# Patient Record
Sex: MALE | Race: OTHER | NOT HISPANIC OR LATINO | URBAN - METROPOLITAN AREA
[De-identification: names, ages, dates, MRNs, and addresses within clinical notes are randomized per-mention and may not be internally consistent; named-entity substitution may affect disease eponyms.]

---

## 2021-11-05 ENCOUNTER — INPATIENT (INPATIENT)
Facility: HOSPITAL | Age: 56
LOS: 20 days | Discharge: HOME | End: 2021-11-26
Attending: STUDENT IN AN ORGANIZED HEALTH CARE EDUCATION/TRAINING PROGRAM | Admitting: STUDENT IN AN ORGANIZED HEALTH CARE EDUCATION/TRAINING PROGRAM
Payer: MEDICAID

## 2021-11-05 VITALS
RESPIRATION RATE: 20 BRPM | SYSTOLIC BLOOD PRESSURE: 117 MMHG | TEMPERATURE: 98 F | DIASTOLIC BLOOD PRESSURE: 67 MMHG | OXYGEN SATURATION: 100 % | HEART RATE: 94 BPM

## 2021-11-05 DIAGNOSIS — Z99.2 DEPENDENCE ON RENAL DIALYSIS: ICD-10-CM

## 2021-11-05 DIAGNOSIS — N18.6 END STAGE RENAL DISEASE: ICD-10-CM

## 2021-11-05 DIAGNOSIS — K52.9 NONINFECTIVE GASTROENTERITIS AND COLITIS, UNSPECIFIED: ICD-10-CM

## 2021-11-05 DIAGNOSIS — D63.1 ANEMIA IN CHRONIC KIDNEY DISEASE: ICD-10-CM

## 2021-11-05 DIAGNOSIS — Z79.01 LONG TERM (CURRENT) USE OF ANTICOAGULANTS: ICD-10-CM

## 2021-11-05 DIAGNOSIS — Z59.7 INSUFFICIENT SOCIAL INSURANCE AND WELFARE SUPPORT: ICD-10-CM

## 2021-11-05 DIAGNOSIS — E11.65 TYPE 2 DIABETES MELLITUS WITH HYPERGLYCEMIA: ICD-10-CM

## 2021-11-05 DIAGNOSIS — E87.2 ACIDOSIS: ICD-10-CM

## 2021-11-05 DIAGNOSIS — I25.10 ATHEROSCLEROTIC HEART DISEASE OF NATIVE CORONARY ARTERY WITHOUT ANGINA PECTORIS: ICD-10-CM

## 2021-11-05 DIAGNOSIS — Z87.891 PERSONAL HISTORY OF NICOTINE DEPENDENCE: ICD-10-CM

## 2021-11-05 DIAGNOSIS — Z75.1 PERSON AWAITING ADMISSION TO ADEQUATE FACILITY ELSEWHERE: ICD-10-CM

## 2021-11-05 DIAGNOSIS — E11.42 TYPE 2 DIABETES MELLITUS WITH DIABETIC POLYNEUROPATHY: ICD-10-CM

## 2021-11-05 DIAGNOSIS — R45.1 RESTLESSNESS AND AGITATION: ICD-10-CM

## 2021-11-05 DIAGNOSIS — Z79.84 LONG TERM (CURRENT) USE OF ORAL HYPOGLYCEMIC DRUGS: ICD-10-CM

## 2021-11-05 DIAGNOSIS — Z98.890 OTHER SPECIFIED POSTPROCEDURAL STATES: Chronic | ICD-10-CM

## 2021-11-05 DIAGNOSIS — Z95.5 PRESENCE OF CORONARY ANGIOPLASTY IMPLANT AND GRAFT: ICD-10-CM

## 2021-11-05 DIAGNOSIS — E11.22 TYPE 2 DIABETES MELLITUS WITH DIABETIC CHRONIC KIDNEY DISEASE: ICD-10-CM

## 2021-11-05 DIAGNOSIS — Z86.718 PERSONAL HISTORY OF OTHER VENOUS THROMBOSIS AND EMBOLISM: ICD-10-CM

## 2021-11-05 DIAGNOSIS — E53.8 DEFICIENCY OF OTHER SPECIFIED B GROUP VITAMINS: ICD-10-CM

## 2021-11-05 DIAGNOSIS — I12.0 HYPERTENSIVE CHRONIC KIDNEY DISEASE WITH STAGE 5 CHRONIC KIDNEY DISEASE OR END STAGE RENAL DISEASE: ICD-10-CM

## 2021-11-05 LAB
ALBUMIN SERPL ELPH-MCNC: 3.6 G/DL — SIGNIFICANT CHANGE UP (ref 3.5–5.2)
ALP SERPL-CCNC: 148 U/L — HIGH (ref 30–115)
ALT FLD-CCNC: 17 U/L — SIGNIFICANT CHANGE UP (ref 0–41)
ANION GAP SERPL CALC-SCNC: 19 MMOL/L — HIGH (ref 7–14)
AST SERPL-CCNC: 18 U/L — SIGNIFICANT CHANGE UP (ref 0–41)
BASOPHILS # BLD AUTO: 0.04 K/UL — SIGNIFICANT CHANGE UP (ref 0–0.2)
BASOPHILS NFR BLD AUTO: 0.6 % — SIGNIFICANT CHANGE UP (ref 0–1)
BILIRUB SERPL-MCNC: 0.3 MG/DL — SIGNIFICANT CHANGE UP (ref 0.2–1.2)
BUN SERPL-MCNC: 49 MG/DL — HIGH (ref 10–20)
CALCIUM SERPL-MCNC: 8.1 MG/DL — LOW (ref 8.5–10.1)
CHLORIDE SERPL-SCNC: 101 MMOL/L — SIGNIFICANT CHANGE UP (ref 98–110)
CO2 SERPL-SCNC: 20 MMOL/L — SIGNIFICANT CHANGE UP (ref 17–32)
CREAT SERPL-MCNC: 4.9 MG/DL — CRITICAL HIGH (ref 0.7–1.5)
EOSINOPHIL # BLD AUTO: 0.52 K/UL — SIGNIFICANT CHANGE UP (ref 0–0.7)
EOSINOPHIL NFR BLD AUTO: 7.4 % — SIGNIFICANT CHANGE UP (ref 0–8)
GLUCOSE BLDC GLUCOMTR-MCNC: 282 MG/DL — HIGH (ref 70–99)
GLUCOSE SERPL-MCNC: 166 MG/DL — HIGH (ref 70–99)
HCT VFR BLD CALC: 32.5 % — LOW (ref 42–52)
HGB BLD-MCNC: 10.3 G/DL — LOW (ref 14–18)
IMM GRANULOCYTES NFR BLD AUTO: 0.3 % — SIGNIFICANT CHANGE UP (ref 0.1–0.3)
LIDOCAIN IGE QN: 69 U/L — HIGH (ref 7–60)
LYMPHOCYTES # BLD AUTO: 1.52 K/UL — SIGNIFICANT CHANGE UP (ref 1.2–3.4)
LYMPHOCYTES # BLD AUTO: 21.6 % — SIGNIFICANT CHANGE UP (ref 20.5–51.1)
MAGNESIUM SERPL-MCNC: 1.8 MG/DL — SIGNIFICANT CHANGE UP (ref 1.8–2.4)
MCHC RBC-ENTMCNC: 27.9 PG — SIGNIFICANT CHANGE UP (ref 27–31)
MCHC RBC-ENTMCNC: 31.7 G/DL — LOW (ref 32–37)
MCV RBC AUTO: 88.1 FL — SIGNIFICANT CHANGE UP (ref 80–94)
MONOCYTES # BLD AUTO: 0.49 K/UL — SIGNIFICANT CHANGE UP (ref 0.1–0.6)
MONOCYTES NFR BLD AUTO: 7 % — SIGNIFICANT CHANGE UP (ref 1.7–9.3)
NEUTROPHILS # BLD AUTO: 4.46 K/UL — SIGNIFICANT CHANGE UP (ref 1.4–6.5)
NEUTROPHILS NFR BLD AUTO: 63.1 % — SIGNIFICANT CHANGE UP (ref 42.2–75.2)
NRBC # BLD: 0 /100 WBCS — SIGNIFICANT CHANGE UP (ref 0–0)
PLATELET # BLD AUTO: 168 K/UL — SIGNIFICANT CHANGE UP (ref 130–400)
POTASSIUM SERPL-MCNC: 4.2 MMOL/L — SIGNIFICANT CHANGE UP (ref 3.5–5)
POTASSIUM SERPL-SCNC: 4.2 MMOL/L — SIGNIFICANT CHANGE UP (ref 3.5–5)
PROT SERPL-MCNC: 6.6 G/DL — SIGNIFICANT CHANGE UP (ref 6–8)
RBC # BLD: 3.69 M/UL — LOW (ref 4.7–6.1)
RBC # FLD: 13.5 % — SIGNIFICANT CHANGE UP (ref 11.5–14.5)
SARS-COV-2 RNA SPEC QL NAA+PROBE: SIGNIFICANT CHANGE UP
SODIUM SERPL-SCNC: 140 MMOL/L — SIGNIFICANT CHANGE UP (ref 135–146)
WBC # BLD: 7.05 K/UL — SIGNIFICANT CHANGE UP (ref 4.8–10.8)
WBC # FLD AUTO: 7.05 K/UL — SIGNIFICANT CHANGE UP (ref 4.8–10.8)

## 2021-11-05 PROCEDURE — 99223 1ST HOSP IP/OBS HIGH 75: CPT

## 2021-11-05 PROCEDURE — 99285 EMERGENCY DEPT VISIT HI MDM: CPT

## 2021-11-05 RX ORDER — PREGABALIN 225 MG/1
1 CAPSULE ORAL
Qty: 0 | Refills: 0 | DISCHARGE

## 2021-11-05 RX ORDER — CALCITRIOL 0.5 UG/1
0.5 CAPSULE ORAL
Refills: 0 | Status: DISCONTINUED | OUTPATIENT
Start: 2021-11-05 | End: 2021-11-26

## 2021-11-05 RX ORDER — SIMETHICONE 80 MG/1
80 TABLET, CHEWABLE ORAL
Refills: 0 | Status: DISCONTINUED | OUTPATIENT
Start: 2021-11-05 | End: 2021-11-26

## 2021-11-05 RX ORDER — INSULIN GLARGINE 100 [IU]/ML
21 INJECTION, SOLUTION SUBCUTANEOUS AT BEDTIME
Refills: 0 | Status: DISCONTINUED | OUTPATIENT
Start: 2021-11-05 | End: 2021-11-26

## 2021-11-05 RX ORDER — DEXTROSE 50 % IN WATER 50 %
25 SYRINGE (ML) INTRAVENOUS ONCE
Refills: 0 | Status: DISCONTINUED | OUTPATIENT
Start: 2021-11-05 | End: 2021-11-26

## 2021-11-05 RX ORDER — GLUCAGON INJECTION, SOLUTION 0.5 MG/.1ML
1 INJECTION, SOLUTION SUBCUTANEOUS ONCE
Refills: 0 | Status: DISCONTINUED | OUTPATIENT
Start: 2021-11-05 | End: 2021-11-26

## 2021-11-05 RX ORDER — DARBEPOETIN ALFA IN POLYSORBAT 200MCG/0.4
0.4 PEN INJECTOR (ML) SUBCUTANEOUS
Qty: 0 | Refills: 0 | DISCHARGE

## 2021-11-05 RX ORDER — DEXTROSE 50 % IN WATER 50 %
12.5 SYRINGE (ML) INTRAVENOUS ONCE
Refills: 0 | Status: DISCONTINUED | OUTPATIENT
Start: 2021-11-05 | End: 2021-11-26

## 2021-11-05 RX ORDER — SODIUM CHLORIDE 9 MG/ML
1000 INJECTION, SOLUTION INTRAVENOUS
Refills: 0 | Status: DISCONTINUED | OUTPATIENT
Start: 2021-11-05 | End: 2021-11-26

## 2021-11-05 RX ORDER — GABAPENTIN 400 MG/1
300 CAPSULE ORAL EVERY 12 HOURS
Refills: 0 | Status: ACTIVE | OUTPATIENT
Start: 2021-11-05 | End: 2022-10-04

## 2021-11-05 RX ORDER — DEXTROSE 50 % IN WATER 50 %
15 SYRINGE (ML) INTRAVENOUS ONCE
Refills: 0 | Status: DISCONTINUED | OUTPATIENT
Start: 2021-11-05 | End: 2021-11-26

## 2021-11-05 RX ORDER — INSULIN LISPRO 100/ML
7 VIAL (ML) SUBCUTANEOUS
Refills: 0 | Status: DISCONTINUED | OUTPATIENT
Start: 2021-11-05 | End: 2021-11-26

## 2021-11-05 RX ORDER — IRON SUCROSE 20 MG/ML
5 INJECTION, SOLUTION INTRAVENOUS
Qty: 0 | Refills: 0 | DISCHARGE

## 2021-11-05 RX ORDER — PREGABALIN 225 MG/1
1000 CAPSULE ORAL DAILY
Refills: 0 | Status: DISCONTINUED | OUTPATIENT
Start: 2021-11-05 | End: 2021-11-26

## 2021-11-05 RX ORDER — DARBEPOETIN ALFA IN POLYSORBAT 200MCG/0.4
40 PEN INJECTOR (ML) SUBCUTANEOUS
Refills: 0 | Status: DISCONTINUED | OUTPATIENT
Start: 2021-11-11 | End: 2021-11-17

## 2021-11-05 RX ORDER — CLOPIDOGREL BISULFATE 75 MG/1
75 TABLET, FILM COATED ORAL DAILY
Refills: 0 | Status: DISCONTINUED | OUTPATIENT
Start: 2021-11-05 | End: 2021-11-26

## 2021-11-05 RX ORDER — APIXABAN 2.5 MG/1
5 TABLET, FILM COATED ORAL
Refills: 0 | Status: DISCONTINUED | OUTPATIENT
Start: 2021-11-05 | End: 2021-11-26

## 2021-11-05 RX ORDER — INSULIN LISPRO 100/ML
VIAL (ML) SUBCUTANEOUS
Refills: 0 | Status: DISCONTINUED | OUTPATIENT
Start: 2021-11-05 | End: 2021-11-26

## 2021-11-05 RX ORDER — ATORVASTATIN CALCIUM 80 MG/1
40 TABLET, FILM COATED ORAL AT BEDTIME
Refills: 0 | Status: DISCONTINUED | OUTPATIENT
Start: 2021-11-05 | End: 2021-11-26

## 2021-11-05 RX ORDER — IRON SUCROSE 20 MG/ML
100 INJECTION, SOLUTION INTRAVENOUS
Refills: 0 | Status: DISCONTINUED | OUTPATIENT
Start: 2021-11-05 | End: 2021-11-24

## 2021-11-05 RX ADMIN — INSULIN GLARGINE 21 UNIT(S): 100 INJECTION, SOLUTION SUBCUTANEOUS at 22:34

## 2021-11-05 RX ADMIN — ATORVASTATIN CALCIUM 40 MILLIGRAM(S): 80 TABLET, FILM COATED ORAL at 22:35

## 2021-11-05 SDOH — ECONOMIC STABILITY - INCOME SECURITY: INSUFFICIENT SOCIAL INSURANCE AND WELFARE SUPPORT: Z59.7

## 2021-11-05 NOTE — ED PROVIDER NOTE - ATTENDING CONTRIBUTION TO CARE
55 yo m with pmh of esrd hd t/th/sat, anemia, cad, hld, dm2, cellulitis, difficulty walking, sent by [facility] for agitation.  as per document, pt was verbally abusive to the staff and refusing care.  as per pt, he has been neglected at the NH and wants a different placement.  he is hoping that there could be a way to have home dialysis set up for him.  no n/v/d, no fever, no chills, no sob, no abd pain.  exam: nad, ncat, perrl, eomi, mmm, rrr, ctab, abd soft, nt,nd aox3, 4/5 BLE strength imp: pt with esrd on hd, unable to walk, having difficulty with his current NH and wants a different arrangement.  will check labs and admit for evaluation

## 2021-11-05 NOTE — H&P ADULT - NSHPPHYSICALEXAM_GEN_ALL_CORE
General: No acute distress; Pallor (-), Icterus (-), Cyanosis (-), Clubbing (-)  HEENT: Normocephalic, atraumatic, PERRLA, EOMI  PULM: Bilaterally equal and clear breath sounds, wheeze (-), rubs (-), crackles (-)  CVS: Normal S1 and S2, murmurs (-), rubs (-), gallops (-), tessio on left chest wall  GI: Soft, nondistended, nontender, BS +  MSK: Edema (-), no muscle, bone or joint tenderness noted  SKIN: Warm and well perfused, chronic venostatic changes above bilateral medial malleoli  NEURO:  Alert and Oriented x 3; No gross focal neurological deficit noted

## 2021-11-05 NOTE — H&P ADULT - NSICDXPASTMEDICALHX_GEN_ALL_CORE_FT
PAST MEDICAL HISTORY:  CAD (coronary artery disease)     DM (diabetes mellitus)     DVT (deep venous thrombosis)     ESRD on dialysis

## 2021-11-05 NOTE — ED PROVIDER NOTE - NS ED ROS FT
Constitutional: (-) fever  Eyes/ENT: (-) blurry vision, (-) epistaxis  Cardiovascular: (-) chest pain, (-) syncope  Respiratory: (-) cough, (-) shortness of breath  Gastrointestinal: (-) vomiting, (-) diarrhea  Gu: (-) dysuria, (-) hematuria  Musculoskeletal: (-) neck pain, (-) back pain, (-) joint pain  Integumentary: (-) rash, (-) edema  Neurological: (-) headache, (-) altered mental status  Allergic/Immunologic: (-) pruritus

## 2021-11-05 NOTE — H&P ADULT - ASSESSMENT
57 yo M with PMH of DM, CAD s/p Stents, ESRD on HD sat/tu/th presented from [facility] for eval of agitation. As per NH pt became agitated and refused medications and dialysis yesterday. Per patient he had inadequate care at NH and wanted to go home with home care.     # Inadequate social support  - ESRD recently started on HD 6 weeks ago  - Access - Tessio  - was in NH for 3 days but wanted to go home  - Case mgmgt/  consult for placement  - PT eval    # ESRD on HD on S/Tu/Th  - Diabetic nephropathy  - Nephro eval for HD while admitted  - Monitor BMP, phos  - avoid nephrotoxic meds  - continue with venofer, calcitriol    # CAD s/p stents  - patient states that he had cath in 2017 with stents but doesn't remember the name of his cardiologist  - continue with Plavix, statin    # DM/diabetic neuropathy  - FS  - A1c  - Basal bolus - 21/7/7/7  - Sliding scale  - continue with gabapentin    # Chronic diarrhea  - had extensive workup in past  - likely sec to ESRD    # h/o DVT  - per patient he was on coumadin for 15 years  - recently switched to eliquis  - continue with eliquis    # Diet: Renal  # Activity: IAT  # GI PPX: NA  # DVT PPX: On eliquis  # Full code

## 2021-11-05 NOTE — H&P ADULT - ATTENDING COMMENTS
57 YO M with a PMH of DM2, CAD s/p Stents, and ESRD on HD (TTS) who presents to the hospital from his NH for eval of agitation. Pt denies this and states there "was a disagreement between him and the NH." ROS is negative except as above.     FMHx: Reviewed, not relevant    Physical exam shows pt in NAD. VSS, afebrile, not hypoxic on RA. A&Ox3. Neuro exam without deficits, motor/sensory intact, no dysarthria, no facial asymmetry. Muscle strength/sensation intact. CTA B/L with no W/C/R. RRR, no M/G/R. ABD is soft and non-tender, normoactive BSs. LEs without swelling. No rashes. Labs and radiology as above.     ESRD on HD (TTS). No current indication for emergent HD. Renal consult for in-pt HD. Restart home meds.     Diabetes mellitus with hyperglycemia. A1c. FSs. Insulin standing/correctional dosing    Normocytic anemia, at goal for ESRD pt. Pt denies any bleeding symptoms. Replace as necessary.     HAGMA (High AG Metabolic Acidosis) from uremic acidosis. Repeat BMP in the AM.     Social Problem. Social consult. PT eval    Hx of CAD s/p Stents. Restart home meds, except as stated above. DVT PPX. Inform PCP of pt's admission to hospital. My note supersedes the residents note.    Date seen by [email or web address]

## 2021-11-05 NOTE — ED ADULT NURSE REASSESSMENT NOTE - NS ED NURSE REASSESS COMMENT FT1
Pt assessed. Pt is awake and alert. Pt denies any pain or discomfort at this time. Pt admitted to MED; awaiting bed. Pt has R CW tesio for HD. Fall precautions maintained. Pt is not in any distress. Safety and comfort

## 2021-11-05 NOTE — ED PROVIDER NOTE - CLINICAL SUMMARY MEDICAL DECISION MAKING FREE TEXT BOX
pt with esrd on hd, unable to walk, having difficulty with his current NH and wants a different arrangement.  admit for SW evaluation

## 2021-11-05 NOTE — ED PROVIDER NOTE - OBJECTIVE STATEMENT
56y M pmh ESRD sat/tu/th presents for eval of agitation. As per NH pt became agitated and refused medications and dialysis yesterday. Pt states this is false. He had dialysis yesterday, became hypotensive afterwards with an episode of nbnb diarrhea and requested help from a nurse at the NH who did not help him and refused to give him any meds last night or this morning leading to him requesting to be sent back to the hospital or home. Pt at baseline is unsteady on his feet and unable to go home independently so was sent to ED. At this time pt has no complaints but refuses to go back to [facility].

## 2021-11-05 NOTE — ED PROVIDER NOTE - PROGRESS NOTE DETAILS
FB: I called [facility] but was told by the  Arely that there was no nurse or nurse supervisor that could provide information about the reason for pt being sent to ED. As per Secretary Mcgee pt was refusing medications and dialysis.

## 2021-11-05 NOTE — H&P ADULT - NSHPLABSRESULTS_GEN_ALL_CORE
(11-05 @ 15:55)                      10.3  7.05 )-----------( 168                 32.5    Neutrophils = 4.46 (63.1%)  Lymphocytes = 1.52 (21.6%)  Eosinophils = 0.52 (7.4%)  Basophils = 0.04 (0.6%)  Monocytes = 0.49 (7.0%)  Bands = --%    11-05    140  |  101  |  49<H>  ----------------------------<  166<H>  4.2   |  20  |  4.9<HH>    Ca    8.1<L>      05 Nov 2021 15:55  Mg     1.8     11-05    TPro  6.6  /  Alb  3.6  /  TBili  0.3  /  DBili  x   /  AST  18  /  ALT  17  /  AlkPhos  148<H>  11-05

## 2021-11-05 NOTE — ED ADULT TRIAGE NOTE - CHIEF COMPLAINT QUOTE
TANYA from [facility] ns home with c/o failure to thrive and diarrhea. AS per NSG home patient has been agitated and verbally abusive towards staff. He has refused dialysis treatment and care. Hx ESRD right ABEL ochoa

## 2021-11-05 NOTE — H&P ADULT - NSHPSOCIALHISTORY_GEN_ALL_CORE
Substance Use (street drugs): ( x ) never used  (  ) other:  Tobacco Usage:  ( ) never smoked   (  X ) former smoker - 30 pack years   (   ) current smoker  (     ) pack year  Alcohol Usage: None

## 2021-11-05 NOTE — ED ADULT NURSE NOTE - OBJECTIVE STATEMENT
Patient BIBEMS from [facility] for placement, patient states he was not getting good care at [facility] and wants to go somewhere else. Patient also requesting HD set up at home. On assessment no s/s of distress noted at this time. Patient denies nausea, vomiting ,fever, chills, SOB, CP.

## 2021-11-05 NOTE — H&P ADULT - HISTORY OF PRESENT ILLNESS
55 yo M with PMH of DM, CAD s/p Stents, ESRD on HD sat/tu/th presented from [facility] for eval of agitation. As per NH pt became agitated and refused medications and dialysis yesterday. Pt states this is false. He had dialysis yesterday, became hypotensive afterwards with an episode of nbnb diarrhea and requested help from a nurse at the NH who did not help him and refused to give him any meds last night or this morning leading to him requesting to be sent back to the hospital or home. Patient was recently admitted to [facility] for ESRD and was started on HD 6 weeks ago. Pt at baseline is unsteady on his feet d/t neuropathy and unable to go home independently so was sent to ED. At this time pt has no complaints but refuses to go back to [facility].  In the ED vitals were stable. Labs were significant for Hb 10.3, BUN/Cr of 49/4.9. Patient being admitted for placement.

## 2021-11-05 NOTE — ED ADULT NURSE NOTE - CHIEF COMPLAINT QUOTE
TANYA from [facility] ns home with c/o failure to thrive and diarrhea. AS per NSG home patient has been agitated and verbally abusive towards staff. He has refused dialysis treatment and care. Hx ESRD right ABEL ochoa

## 2021-11-06 LAB
A1C WITH ESTIMATED AVERAGE GLUCOSE RESULT: 5.7 % — HIGH (ref 4–5.6)
ALBUMIN SERPL ELPH-MCNC: 3.6 G/DL — SIGNIFICANT CHANGE UP (ref 3.5–5.2)
ALBUMIN SERPL ELPH-MCNC: 3.8 G/DL — SIGNIFICANT CHANGE UP (ref 3.5–5.2)
ALP SERPL-CCNC: 171 U/L — HIGH (ref 30–115)
ALP SERPL-CCNC: 174 U/L — HIGH (ref 30–115)
ALT FLD-CCNC: 17 U/L — SIGNIFICANT CHANGE UP (ref 0–41)
ALT FLD-CCNC: 18 U/L — SIGNIFICANT CHANGE UP (ref 0–41)
ANION GAP SERPL CALC-SCNC: 18 MMOL/L — HIGH (ref 7–14)
ANION GAP SERPL CALC-SCNC: 24 MMOL/L — HIGH (ref 7–14)
AST SERPL-CCNC: 16 U/L — SIGNIFICANT CHANGE UP (ref 0–41)
AST SERPL-CCNC: 18 U/L — SIGNIFICANT CHANGE UP (ref 0–41)
BASOPHILS # BLD AUTO: 0.07 K/UL — SIGNIFICANT CHANGE UP (ref 0–0.2)
BASOPHILS NFR BLD AUTO: 0.9 % — SIGNIFICANT CHANGE UP (ref 0–1)
BILIRUB SERPL-MCNC: 0.3 MG/DL — SIGNIFICANT CHANGE UP (ref 0.2–1.2)
BILIRUB SERPL-MCNC: <0.2 MG/DL — SIGNIFICANT CHANGE UP (ref 0.2–1.2)
BUN SERPL-MCNC: 60 MG/DL — HIGH (ref 10–20)
BUN SERPL-MCNC: 67 MG/DL — CRITICAL HIGH (ref 10–20)
CALCIUM SERPL-MCNC: 8.1 MG/DL — LOW (ref 8.5–10.1)
CALCIUM SERPL-MCNC: 8.2 MG/DL — LOW (ref 8.5–10.1)
CHLORIDE SERPL-SCNC: 102 MMOL/L — SIGNIFICANT CHANGE UP (ref 98–110)
CHLORIDE SERPL-SCNC: 103 MMOL/L — SIGNIFICANT CHANGE UP (ref 98–110)
CO2 SERPL-SCNC: 15 MMOL/L — LOW (ref 17–32)
CO2 SERPL-SCNC: 21 MMOL/L — SIGNIFICANT CHANGE UP (ref 17–32)
CREAT SERPL-MCNC: 5.9 MG/DL — CRITICAL HIGH (ref 0.7–1.5)
CREAT SERPL-MCNC: 6.2 MG/DL — CRITICAL HIGH (ref 0.7–1.5)
EOSINOPHIL # BLD AUTO: 0.72 K/UL — HIGH (ref 0–0.7)
EOSINOPHIL NFR BLD AUTO: 8.9 % — HIGH (ref 0–8)
ESTIMATED AVERAGE GLUCOSE: 117 MG/DL — HIGH (ref 68–114)
GLUCOSE BLDC GLUCOMTR-MCNC: 127 MG/DL — HIGH (ref 70–99)
GLUCOSE BLDC GLUCOMTR-MCNC: 145 MG/DL — HIGH (ref 70–99)
GLUCOSE BLDC GLUCOMTR-MCNC: 154 MG/DL — HIGH (ref 70–99)
GLUCOSE BLDC GLUCOMTR-MCNC: 162 MG/DL — HIGH (ref 70–99)
GLUCOSE SERPL-MCNC: 142 MG/DL — HIGH (ref 70–99)
GLUCOSE SERPL-MCNC: 180 MG/DL — HIGH (ref 70–99)
HCT VFR BLD CALC: 33.7 % — LOW (ref 42–52)
HCT VFR BLD CALC: 33.8 % — LOW (ref 42–52)
HCV AB S/CO SERPL IA: 0.14 COI — SIGNIFICANT CHANGE UP
HCV AB SERPL-IMP: SIGNIFICANT CHANGE UP
HGB BLD-MCNC: 10.5 G/DL — LOW (ref 14–18)
HGB BLD-MCNC: 10.5 G/DL — LOW (ref 14–18)
IMM GRANULOCYTES NFR BLD AUTO: 0.2 % — SIGNIFICANT CHANGE UP (ref 0.1–0.3)
LYMPHOCYTES # BLD AUTO: 1.42 K/UL — SIGNIFICANT CHANGE UP (ref 1.2–3.4)
LYMPHOCYTES # BLD AUTO: 17.6 % — LOW (ref 20.5–51.1)
MAGNESIUM SERPL-MCNC: 1.8 MG/DL — SIGNIFICANT CHANGE UP (ref 1.8–2.4)
MCHC RBC-ENTMCNC: 27.8 PG — SIGNIFICANT CHANGE UP (ref 27–31)
MCHC RBC-ENTMCNC: 28.1 PG — SIGNIFICANT CHANGE UP (ref 27–31)
MCHC RBC-ENTMCNC: 31.1 G/DL — LOW (ref 32–37)
MCHC RBC-ENTMCNC: 31.2 G/DL — LOW (ref 32–37)
MCV RBC AUTO: 89.4 FL — SIGNIFICANT CHANGE UP (ref 80–94)
MCV RBC AUTO: 90.1 FL — SIGNIFICANT CHANGE UP (ref 80–94)
MONOCYTES # BLD AUTO: 0.56 K/UL — SIGNIFICANT CHANGE UP (ref 0.1–0.6)
MONOCYTES NFR BLD AUTO: 7 % — SIGNIFICANT CHANGE UP (ref 1.7–9.3)
NEUTROPHILS # BLD AUTO: 5.26 K/UL — SIGNIFICANT CHANGE UP (ref 1.4–6.5)
NEUTROPHILS NFR BLD AUTO: 65.4 % — SIGNIFICANT CHANGE UP (ref 42.2–75.2)
NRBC # BLD: 0 /100 WBCS — SIGNIFICANT CHANGE UP (ref 0–0)
NRBC # BLD: 0 /100 WBCS — SIGNIFICANT CHANGE UP (ref 0–0)
PHOSPHATE SERPL-MCNC: 5.7 MG/DL — HIGH (ref 2.1–4.9)
PLATELET # BLD AUTO: 173 K/UL — SIGNIFICANT CHANGE UP (ref 130–400)
PLATELET # BLD AUTO: 202 K/UL — SIGNIFICANT CHANGE UP (ref 130–400)
POTASSIUM SERPL-MCNC: 4 MMOL/L — SIGNIFICANT CHANGE UP (ref 3.5–5)
POTASSIUM SERPL-MCNC: 4.1 MMOL/L — SIGNIFICANT CHANGE UP (ref 3.5–5)
POTASSIUM SERPL-SCNC: 4 MMOL/L — SIGNIFICANT CHANGE UP (ref 3.5–5)
POTASSIUM SERPL-SCNC: 4.1 MMOL/L — SIGNIFICANT CHANGE UP (ref 3.5–5)
PROT SERPL-MCNC: 6.5 G/DL — SIGNIFICANT CHANGE UP (ref 6–8)
PROT SERPL-MCNC: 6.8 G/DL — SIGNIFICANT CHANGE UP (ref 6–8)
RBC # BLD: 3.74 M/UL — LOW (ref 4.7–6.1)
RBC # BLD: 3.78 M/UL — LOW (ref 4.7–6.1)
RBC # FLD: 13.3 % — SIGNIFICANT CHANGE UP (ref 11.5–14.5)
RBC # FLD: 13.5 % — SIGNIFICANT CHANGE UP (ref 11.5–14.5)
SODIUM SERPL-SCNC: 141 MMOL/L — SIGNIFICANT CHANGE UP (ref 135–146)
SODIUM SERPL-SCNC: 142 MMOL/L — SIGNIFICANT CHANGE UP (ref 135–146)
WBC # BLD: 8.05 K/UL — SIGNIFICANT CHANGE UP (ref 4.8–10.8)
WBC # BLD: 8.21 K/UL — SIGNIFICANT CHANGE UP (ref 4.8–10.8)
WBC # FLD AUTO: 8.05 K/UL — SIGNIFICANT CHANGE UP (ref 4.8–10.8)
WBC # FLD AUTO: 8.21 K/UL — SIGNIFICANT CHANGE UP (ref 4.8–10.8)

## 2021-11-06 RX ORDER — INFLUENZA VIRUS VACCINE 15; 15; 15; 15 UG/.5ML; UG/.5ML; UG/.5ML; UG/.5ML
0.5 SUSPENSION INTRAMUSCULAR ONCE
Refills: 0 | Status: DISCONTINUED | OUTPATIENT
Start: 2021-11-06 | End: 2021-11-26

## 2021-11-06 RX ORDER — CALCIUM ACETATE 667 MG
667 TABLET ORAL
Refills: 0 | Status: DISCONTINUED | OUTPATIENT
Start: 2021-11-06 | End: 2021-11-17

## 2021-11-06 RX ADMIN — PREGABALIN 1000 MICROGRAM(S): 225 CAPSULE ORAL at 11:47

## 2021-11-06 RX ADMIN — Medication 667 MILLIGRAM(S): at 18:19

## 2021-11-06 RX ADMIN — Medication 7 UNIT(S): at 16:40

## 2021-11-06 RX ADMIN — SIMETHICONE 80 MILLIGRAM(S): 80 TABLET, CHEWABLE ORAL at 13:33

## 2021-11-06 RX ADMIN — SIMETHICONE 80 MILLIGRAM(S): 80 TABLET, CHEWABLE ORAL at 18:19

## 2021-11-06 RX ADMIN — Medication 7 UNIT(S): at 11:46

## 2021-11-06 RX ADMIN — CALCITRIOL 0.5 MICROGRAM(S): 0.5 CAPSULE ORAL at 11:47

## 2021-11-06 RX ADMIN — Medication 1: at 08:05

## 2021-11-06 RX ADMIN — ATORVASTATIN CALCIUM 40 MILLIGRAM(S): 80 TABLET, FILM COATED ORAL at 21:42

## 2021-11-06 RX ADMIN — GABAPENTIN 300 MILLIGRAM(S): 400 CAPSULE ORAL at 05:16

## 2021-11-06 RX ADMIN — Medication 667 MILLIGRAM(S): at 21:42

## 2021-11-06 RX ADMIN — APIXABAN 5 MILLIGRAM(S): 2.5 TABLET, FILM COATED ORAL at 17:51

## 2021-11-06 RX ADMIN — Medication 7 UNIT(S): at 08:05

## 2021-11-06 RX ADMIN — CLOPIDOGREL BISULFATE 75 MILLIGRAM(S): 75 TABLET, FILM COATED ORAL at 11:48

## 2021-11-06 RX ADMIN — INSULIN GLARGINE 21 UNIT(S): 100 INJECTION, SOLUTION SUBCUTANEOUS at 21:42

## 2021-11-06 RX ADMIN — GABAPENTIN 300 MILLIGRAM(S): 400 CAPSULE ORAL at 17:51

## 2021-11-06 RX ADMIN — APIXABAN 5 MILLIGRAM(S): 2.5 TABLET, FILM COATED ORAL at 05:16

## 2021-11-06 RX ADMIN — Medication 1 TABLET(S): at 11:48

## 2021-11-06 NOTE — PHYSICAL THERAPY INITIAL EVALUATION ADULT - PERTINENT HX OF CURRENT PROBLEM, REHAB EVAL
55 yo M with PMH of DM, CAD s/p Stents, ESRD on HD sat/tu/th presented from [facility] for eval of agitation. As per NH pt became agitated and refused medications and dialysis yesterday. Per patient he had inadequate care at NH and wanted to go home with home care.

## 2021-11-06 NOTE — PROGRESS NOTE ADULT - ASSESSMENT
ALEXANDRO MONROE 56y Male  MRN#: [id number]   CODE STATUS:________      SUBJECTIVE  Patient is a 56y old Male who presents with a chief complaint of Inadequate social support (05 Nov 2021 22:03)  Currently admitted to medicine with the primary diagnosis of Inadequate social support  Today is hospital day 1d, and this morning he is _________ and reports ________ overnight events.     OBJECTIVE  PAST MEDICAL & SURGICAL HISTORY  CAD (coronary artery disease)    DVT (deep venous thrombosis)    ESRD on dialysis    DM (diabetes mellitus)    H/O varicose vein stripping      ALLERGIES:  No Known Allergies    MEDICATIONS:  STANDING MEDICATIONS  apixaban 5 milliGRAM(s) Oral two times a day  atorvastatin 40 milliGRAM(s) Oral at bedtime  calcitriol   Capsule 0.5 MICROGram(s) Oral <User Schedule>  clopidogrel Tablet 75 milliGRAM(s) Oral daily  cyanocobalamin 1000 MICROGram(s) Oral daily  dextrose 40% Gel 15 Gram(s) Oral once  dextrose 5%. 1000 milliLiter(s) IV Continuous <Continuous>  dextrose 5%. 1000 milliLiter(s) IV Continuous <Continuous>  dextrose 50% Injectable 25 Gram(s) IV Push once  dextrose 50% Injectable 12.5 Gram(s) IV Push once  dextrose 50% Injectable 25 Gram(s) IV Push once  gabapentin 300 milliGRAM(s) Oral every 12 hours  glucagon  Injectable 1 milliGRAM(s) IntraMuscular once  influenza   Vaccine 0.5 milliLiter(s) IntraMuscular once  insulin glargine Injectable (LANTUS) 21 Unit(s) SubCutaneous at bedtime  insulin lispro (ADMELOG) corrective regimen sliding scale   SubCutaneous three times a day before meals  insulin lispro Injectable (ADMELOG) 7 Unit(s) SubCutaneous three times a day before meals  iron sucrose Injectable 100 milliGRAM(s) IV Push <User Schedule>  multivitamin 1 Tablet(s) Oral daily    PRN MEDICATIONS  simethicone 80 milliGRAM(s) Chew four times a day PRN      VITAL SIGNS: Last 24 Hours  T(C): 36.4 (06 Nov 2021 04:20), Max: 36.8 (05 Nov 2021 14:34)  T(F): 97.6 (06 Nov 2021 04:20), Max: 98.2 (05 Nov 2021 14:34)  HR: 101 (06 Nov 2021 04:20) (94 - 101)  BP: 132/66 (06 Nov 2021 04:20) (117/67 - 132/66)  BP(mean): --  RR: 18 (06 Nov 2021 04:20) (18 - 20)  SpO2: 100% (05 Nov 2021 14:34) (100% - 100%)    LABS:                        10.3   7.05  )-----------( 168      ( 05 Nov 2021 15:55 )             32.5     11-05    140  |  101  |  49<H>  ----------------------------<  166<H>  4.2   |  20  |  4.9<HH>    Ca    8.1<L>      05 Nov 2021 15:55  Mg     1.8     11-05    TPro  6.6  /  Alb  3.6  /  TBili  0.3  /  DBili  x   /  AST  18  /  ALT  17  /  AlkPhos  148<H>  11-05                  RADIOLOGY:      PHYSICAL EXAM:    GENERAL: NAD, well-developed, AAOx3  HEENT:  Atraumatic, Normocephalic. EOMI, PERRLA, conjunctiva and sclera clear, No JVD  PULMONARY: Clear to auscultation bilaterally; No wheeze  CARDIOVASCULAR: Regular rate and rhythm; No murmurs, rubs, or gallops  GASTROINTESTINAL: Soft, Nontender, Nondistended; Bowel sounds present  MUSCULOSKELETAL:  2+ Peripheral Pulses, No clubbing, cyanosis, or edema  NEUROLOGY: non-focal  SKIN: No rashes or lesions    ASSESSMENT & PLAN  55 yo M with PMH of DM, CAD s/p Stents, ESRD on HD sat/tu/th presented from [facility] for eval of agitation. As per NH pt became agitated and refused medications and dialysis yesterday. Per patient he had inadequate care at NH and wanted to go home with home care.    # ESRD on HD (TTS). No current indication for emergent HD. Renal consult for in-pt HD. Restart home meds.     # Diabetes mellitus with hyperglycemia. A1c. FSs. Insulin standing/correctional dosing    # Normocytic anemia, at goal for ESRD pt. Pt denies any bleeding symptoms. Replace as necessary.     # HAGMA (High AG Metabolic Acidosis) from uremic acidosis. Repeat BMP in the AM.     # Hx of CAD s/p Stents. plavix and lipitor     # H/O DVT: per patient he was on coumadin for 15 years, recently switched to [employer]     PT/ social work consult for placement     DVT PPX.    ready for dscharge from medical standpoint, pending discharge plan  ALEXANDRO MONROE 56y Male  MRN#: [id number]   CODE STATUS:full code      SUBJECTIVE  Patient is a 56y old Male who presents with a chief complaint of Inadequate social support (05 Nov 2021 22:03)  Currently admitted to medicine with the primary diagnosis of Inadequate social support  Today is hospital day 1d, and this morning he is resting in bed and reports no overnight events.     OBJECTIVE  PAST MEDICAL & SURGICAL HISTORY  CAD (coronary artery disease)    DVT (deep venous thrombosis)    ESRD on dialysis    DM (diabetes mellitus)    H/O varicose vein stripping      ALLERGIES:  No Known Allergies    MEDICATIONS:  STANDING MEDICATIONS  apixaban 5 milliGRAM(s) Oral two times a day  atorvastatin 40 milliGRAM(s) Oral at bedtime  calcitriol   Capsule 0.5 MICROGram(s) Oral <User Schedule>  clopidogrel Tablet 75 milliGRAM(s) Oral daily  cyanocobalamin 1000 MICROGram(s) Oral daily  dextrose 40% Gel 15 Gram(s) Oral once  dextrose 5%. 1000 milliLiter(s) IV Continuous <Continuous>  dextrose 5%. 1000 milliLiter(s) IV Continuous <Continuous>  dextrose 50% Injectable 25 Gram(s) IV Push once  dextrose 50% Injectable 12.5 Gram(s) IV Push once  dextrose 50% Injectable 25 Gram(s) IV Push once  gabapentin 300 milliGRAM(s) Oral every 12 hours  glucagon  Injectable 1 milliGRAM(s) IntraMuscular once  influenza   Vaccine 0.5 milliLiter(s) IntraMuscular once  insulin glargine Injectable (LANTUS) 21 Unit(s) SubCutaneous at bedtime  insulin lispro (ADMELOG) corrective regimen sliding scale   SubCutaneous three times a day before meals  insulin lispro Injectable (ADMELOG) 7 Unit(s) SubCutaneous three times a day before meals  iron sucrose Injectable 100 milliGRAM(s) IV Push <User Schedule>  multivitamin 1 Tablet(s) Oral daily    PRN MEDICATIONS  simethicone 80 milliGRAM(s) Chew four times a day PRN      VITAL SIGNS: Last 24 Hours  T(C): 36.4 (06 Nov 2021 04:20), Max: 36.8 (05 Nov 2021 14:34)  T(F): 97.6 (06 Nov 2021 04:20), Max: 98.2 (05 Nov 2021 14:34)  HR: 101 (06 Nov 2021 04:20) (94 - 101)  BP: 132/66 (06 Nov 2021 04:20) (117/67 - 132/66)  BP(mean): --  RR: 18 (06 Nov 2021 04:20) (18 - 20)  SpO2: 100% (05 Nov 2021 14:34) (100% - 100%)    LABS:                        10.3   7.05  )-----------( 168      ( 05 Nov 2021 15:55 )             32.5     11-05    140  |  101  |  49<H>  ----------------------------<  166<H>  4.2   |  20  |  4.9<HH>    Ca    8.1<L>      05 Nov 2021 15:55  Mg     1.8     11-05    TPro  6.6  /  Alb  3.6  /  TBili  0.3  /  DBili  x   /  AST  18  /  ALT  17  /  AlkPhos  148<H>  11-05                  RADIOLOGY:      PHYSICAL EXAM:    GENERAL: NAD, well-developed, AAOx3  HEENT:  Atraumatic, Normocephalic. EOMI, PERRLA, conjunctiva and sclera clear, No JVD  PULMONARY: Clear to auscultation bilaterally; No wheeze  CARDIOVASCULAR: Regular rate and rhythm; No murmurs, rubs, or gallops  GASTROINTESTINAL: Soft, Nontender, Nondistended; Bowel sounds present  MUSCULOSKELETAL:  2+ Peripheral Pulses, No clubbing, cyanosis, or edema  NEUROLOGY: non-focal  SKIN: No rashes or lesions    ASSESSMENT & PLAN  55 yo M with PMH of DM, CAD s/p Stents, ESRD on HD sat/tu/th presented from [facility] for eval of agitation. As per NH pt became agitated and refused medications and dialysis yesterday. Per patient he had inadequate care at NH and wanted to go home with home care.    # ESRD on HD (TTS). No current indication for emergent HD. Renal consult for in-pt HD. Restart home meds.     # Diabetes mellitus with hyperglycemia. A1c. FSs. Insulin standing/correctional dosing    # Normocytic anemia, at goal for ESRD pt. Pt denies any bleeding symptoms. Replace as necessary.     # HAGMA (High AG Metabolic Acidosis) from uremic acidosis. Repeat BMP in the AM.     # Hx of CAD s/p Stents. plavix and lipitor     # H/O DVT: per patient he was on coumadin for 15 years, recently switched to [facility]     PT/ social work consult for placement     DVT PPX.    ready for discharge from medical standpoint, pending discharge plan, patient refuses to go back to NH    pending: PT/ social work and CM f/u

## 2021-11-06 NOTE — PHYSICAL THERAPY INITIAL EVALUATION ADULT - PATIENT/FAMILY/SIGNIFICANT OTHER GOALS STATEMENT, PT EVAL
wishes to return home and do home dialysis , states he will be ambulanced home and brought up stairs

## 2021-11-07 LAB
ALBUMIN SERPL ELPH-MCNC: 3.3 G/DL — LOW (ref 3.5–5.2)
ALP SERPL-CCNC: 124 U/L — HIGH (ref 30–115)
ALT FLD-CCNC: 15 U/L — SIGNIFICANT CHANGE UP (ref 0–41)
ANION GAP SERPL CALC-SCNC: 17 MMOL/L — HIGH (ref 7–14)
AST SERPL-CCNC: 14 U/L — SIGNIFICANT CHANGE UP (ref 0–41)
BASOPHILS # BLD AUTO: 0.05 K/UL — SIGNIFICANT CHANGE UP (ref 0–0.2)
BASOPHILS NFR BLD AUTO: 0.7 % — SIGNIFICANT CHANGE UP (ref 0–1)
BILIRUB SERPL-MCNC: 0.2 MG/DL — SIGNIFICANT CHANGE UP (ref 0.2–1.2)
BUN SERPL-MCNC: 72 MG/DL — CRITICAL HIGH (ref 10–20)
CALCIUM SERPL-MCNC: 8.1 MG/DL — LOW (ref 8.5–10.1)
CHLORIDE SERPL-SCNC: 103 MMOL/L — SIGNIFICANT CHANGE UP (ref 98–110)
CO2 SERPL-SCNC: 20 MMOL/L — SIGNIFICANT CHANGE UP (ref 17–32)
COVID-19 NUCLEOCAPSID GAM AB INTERP: POSITIVE
COVID-19 NUCLEOCAPSID TOTAL GAM ANTIBODY RESULT: 79.5 INDEX — HIGH
COVID-19 SPIKE DOMAIN AB INTERP: POSITIVE
COVID-19 SPIKE DOMAIN ANTIBODY RESULT: >250 U/ML — HIGH
CREAT SERPL-MCNC: 6.8 MG/DL — CRITICAL HIGH (ref 0.7–1.5)
EOSINOPHIL # BLD AUTO: 0.71 K/UL — HIGH (ref 0–0.7)
EOSINOPHIL NFR BLD AUTO: 9.6 % — HIGH (ref 0–8)
GLUCOSE BLDC GLUCOMTR-MCNC: 120 MG/DL — HIGH (ref 70–99)
GLUCOSE BLDC GLUCOMTR-MCNC: 138 MG/DL — HIGH (ref 70–99)
GLUCOSE BLDC GLUCOMTR-MCNC: 142 MG/DL — HIGH (ref 70–99)
GLUCOSE BLDC GLUCOMTR-MCNC: 168 MG/DL — HIGH (ref 70–99)
GLUCOSE SERPL-MCNC: 98 MG/DL — SIGNIFICANT CHANGE UP (ref 70–99)
HCT VFR BLD CALC: 30.7 % — LOW (ref 42–52)
HGB BLD-MCNC: 9.4 G/DL — LOW (ref 14–18)
IMM GRANULOCYTES NFR BLD AUTO: 0.3 % — SIGNIFICANT CHANGE UP (ref 0.1–0.3)
LYMPHOCYTES # BLD AUTO: 1.78 K/UL — SIGNIFICANT CHANGE UP (ref 1.2–3.4)
LYMPHOCYTES # BLD AUTO: 24 % — SIGNIFICANT CHANGE UP (ref 20.5–51.1)
MAGNESIUM SERPL-MCNC: 1.7 MG/DL — LOW (ref 1.8–2.4)
MCHC RBC-ENTMCNC: 27.7 PG — SIGNIFICANT CHANGE UP (ref 27–31)
MCHC RBC-ENTMCNC: 30.6 G/DL — LOW (ref 32–37)
MCV RBC AUTO: 90.6 FL — SIGNIFICANT CHANGE UP (ref 80–94)
MONOCYTES # BLD AUTO: 0.68 K/UL — HIGH (ref 0.1–0.6)
MONOCYTES NFR BLD AUTO: 9.2 % — SIGNIFICANT CHANGE UP (ref 1.7–9.3)
NEUTROPHILS # BLD AUTO: 4.18 K/UL — SIGNIFICANT CHANGE UP (ref 1.4–6.5)
NEUTROPHILS NFR BLD AUTO: 56.2 % — SIGNIFICANT CHANGE UP (ref 42.2–75.2)
NRBC # BLD: 0 /100 WBCS — SIGNIFICANT CHANGE UP (ref 0–0)
PHOSPHATE SERPL-MCNC: 5.9 MG/DL — HIGH (ref 2.1–4.9)
PLATELET # BLD AUTO: 149 K/UL — SIGNIFICANT CHANGE UP (ref 130–400)
POTASSIUM SERPL-MCNC: 4.5 MMOL/L — SIGNIFICANT CHANGE UP (ref 3.5–5)
POTASSIUM SERPL-SCNC: 4.5 MMOL/L — SIGNIFICANT CHANGE UP (ref 3.5–5)
PROT SERPL-MCNC: 5.8 G/DL — LOW (ref 6–8)
RBC # BLD: 3.39 M/UL — LOW (ref 4.7–6.1)
RBC # FLD: 13.3 % — SIGNIFICANT CHANGE UP (ref 11.5–14.5)
SARS-COV-2 IGG+IGM SERPL QL IA: 79.5 INDEX — HIGH
SARS-COV-2 IGG+IGM SERPL QL IA: >250 U/ML — HIGH
SARS-COV-2 IGG+IGM SERPL QL IA: POSITIVE
SARS-COV-2 IGG+IGM SERPL QL IA: POSITIVE
SODIUM SERPL-SCNC: 140 MMOL/L — SIGNIFICANT CHANGE UP (ref 135–146)
WBC # BLD: 7.42 K/UL — SIGNIFICANT CHANGE UP (ref 4.8–10.8)
WBC # FLD AUTO: 7.42 K/UL — SIGNIFICANT CHANGE UP (ref 4.8–10.8)

## 2021-11-07 RX ORDER — BACITRACIN ZINC 500 UNIT/G
1 OINTMENT IN PACKET (EA) TOPICAL
Refills: 0 | Status: DISCONTINUED | OUTPATIENT
Start: 2021-11-07 | End: 2021-11-26

## 2021-11-07 RX ORDER — MAGNESIUM SULFATE 500 MG/ML
2 VIAL (ML) INJECTION ONCE
Refills: 0 | Status: COMPLETED | OUTPATIENT
Start: 2021-11-07 | End: 2021-11-07

## 2021-11-07 RX ADMIN — ATORVASTATIN CALCIUM 40 MILLIGRAM(S): 80 TABLET, FILM COATED ORAL at 21:48

## 2021-11-07 RX ADMIN — INSULIN GLARGINE 21 UNIT(S): 100 INJECTION, SOLUTION SUBCUTANEOUS at 21:48

## 2021-11-07 RX ADMIN — APIXABAN 5 MILLIGRAM(S): 2.5 TABLET, FILM COATED ORAL at 17:17

## 2021-11-07 RX ADMIN — Medication 1 TABLET(S): at 11:51

## 2021-11-07 RX ADMIN — GABAPENTIN 300 MILLIGRAM(S): 400 CAPSULE ORAL at 05:34

## 2021-11-07 RX ADMIN — Medication 7 UNIT(S): at 11:50

## 2021-11-07 RX ADMIN — Medication 667 MILLIGRAM(S): at 17:17

## 2021-11-07 RX ADMIN — Medication 50 GRAM(S): at 14:10

## 2021-11-07 RX ADMIN — Medication 667 MILLIGRAM(S): at 08:12

## 2021-11-07 RX ADMIN — Medication 7 UNIT(S): at 17:16

## 2021-11-07 RX ADMIN — APIXABAN 5 MILLIGRAM(S): 2.5 TABLET, FILM COATED ORAL at 05:34

## 2021-11-07 RX ADMIN — CLOPIDOGREL BISULFATE 75 MILLIGRAM(S): 75 TABLET, FILM COATED ORAL at 11:51

## 2021-11-07 RX ADMIN — Medication 7 UNIT(S): at 08:10

## 2021-11-07 RX ADMIN — Medication 667 MILLIGRAM(S): at 21:48

## 2021-11-07 RX ADMIN — Medication 1 APPLICATION(S): at 17:17

## 2021-11-07 RX ADMIN — PREGABALIN 1000 MICROGRAM(S): 225 CAPSULE ORAL at 11:51

## 2021-11-07 RX ADMIN — GABAPENTIN 300 MILLIGRAM(S): 400 CAPSULE ORAL at 17:17

## 2021-11-07 RX ADMIN — Medication 667 MILLIGRAM(S): at 11:51

## 2021-11-07 NOTE — PROGRESS NOTE ADULT - ASSESSMENT
57 yo M with PMH of DM, CAD s/p Stents, ESRD on HD  at [facility] presented from [facility] for eval of agitation. As per NH pt became agitated and refused medications .  patient had HD on thursday and 1 h yesterday   patient stated that he does not want to go back to [facility] and he wants to do HHD     # for hd on monday , not volume overload, not on o2, not hyperkalemia/ no need for HD today   #on venofer check iron stores , h/h at goal   #  ph level  noted , pholso 1//1/1  # BP well controlled   #  evaluation for placement   # will follow

## 2021-11-07 NOTE — PROGRESS NOTE ADULT - SUBJECTIVE AND OBJECTIVE BOX
Nephrology progress note    Patient was seen and examined, events over the last 24 h noted .    Allergies:  No Known Allergies    Hospital Medications:   MEDICATIONS  (STANDING):  apixaban 5 milliGRAM(s) Oral two times a day  atorvastatin 40 milliGRAM(s) Oral at bedtime  BACItracin   Ointment 1 Application(s) Topical two times a day  calcitriol   Capsule 0.5 MICROGram(s) Oral <User Schedule>  calcium acetate 667 milliGRAM(s) Oral four times a day with meals  clopidogrel Tablet 75 milliGRAM(s) Oral daily  cyanocobalamin 1000 MICROGram(s) Oral daily  dextrose 40% Gel 15 Gram(s) Oral once  dextrose 5%. 1000 milliLiter(s) (50 mL/Hr) IV Continuous <Continuous>  dextrose 5%. 1000 milliLiter(s) (100 mL/Hr) IV Continuous <Continuous>  dextrose 50% Injectable 25 Gram(s) IV Push once  dextrose 50% Injectable 12.5 Gram(s) IV Push once  dextrose 50% Injectable 25 Gram(s) IV Push once  gabapentin 300 milliGRAM(s) Oral every 12 hours  glucagon  Injectable 1 milliGRAM(s) IntraMuscular once  influenza   Vaccine 0.5 milliLiter(s) IntraMuscular once  insulin glargine Injectable (LANTUS) 21 Unit(s) SubCutaneous at bedtime  insulin lispro (ADMELOG) corrective regimen sliding scale   SubCutaneous three times a day before meals  insulin lispro Injectable (ADMELOG) 7 Unit(s) SubCutaneous three times a day before meals  iron sucrose Injectable 100 milliGRAM(s) IV Push <User Schedule>  multivitamin 1 Tablet(s) Oral daily        VITALS:  T(F): 96.7 (11-07-21 @ 04:35), Max: 97.2 (11-06-21 @ 20:15)  HR: 91 (11-07-21 @ 04:35)  BP: 135/70 (11-07-21 @ 04:35)  RR: 18 (11-07-21 @ 04:35)  SpO2: --  Wt(kg): --        PHYSICAL EXAM:  	Gen: NAD  	Pulm: CTA B/L  	CV: S1S2; no rub  	Abd: soft, nontender/nondistended  	LE:  no edema  	Vascular access:TESIO     LABS:  11-07    140  |  103  |  72<HH>  ----------------------------<  98  4.5   |  20  |  6.8<HH>    Ca    8.1<L>      07 Nov 2021 04:30  Phos  5.9     11-07  Mg     1.7     11-07    TPro  5.8<L>  /  Alb  3.3<L>  /  TBili  0.2  /  DBili      /  AST  14  /  ALT  15  /  AlkPhos  124<H>  11-07                          9.4    7.42  )-----------( 149      ( 07 Nov 2021 04:30 )             30.7       Urine Studies:      RADIOLOGY & ADDITIONAL STUDIES:

## 2021-11-07 NOTE — PROGRESS NOTE ADULT - ASSESSMENT
ALEXANDRO MONROE 56y Male  MRN#: [id number]   CODE STATUS:full code       SUBJECTIVE  Patient is a 56y old Male who presents with a chief complaint of Inadequate social support (06 Nov 2021 15:42)  Currently admitted to medicine with the primary diagnosis of Inadequate social support  Today is hospital day 2d, and this morning he is resting in bed and reports  no overnight events.       OBJECTIVE  PAST MEDICAL & SURGICAL HISTORY  CAD (coronary artery disease)    DVT (deep venous thrombosis)    ESRD on dialysis    DM (diabetes mellitus)    H/O varicose vein stripping      ALLERGIES:  No Known Allergies    MEDICATIONS:  STANDING MEDICATIONS  apixaban 5 milliGRAM(s) Oral two times a day  atorvastatin 40 milliGRAM(s) Oral at bedtime  BACItracin   Ointment 1 Application(s) Topical two times a day  calcitriol   Capsule 0.5 MICROGram(s) Oral <User Schedule>  calcium acetate 667 milliGRAM(s) Oral four times a day with meals  clopidogrel Tablet 75 milliGRAM(s) Oral daily  cyanocobalamin 1000 MICROGram(s) Oral daily  dextrose 40% Gel 15 Gram(s) Oral once  dextrose 5%. 1000 milliLiter(s) IV Continuous <Continuous>  dextrose 5%. 1000 milliLiter(s) IV Continuous <Continuous>  dextrose 50% Injectable 25 Gram(s) IV Push once  dextrose 50% Injectable 12.5 Gram(s) IV Push once  dextrose 50% Injectable 25 Gram(s) IV Push once  gabapentin 300 milliGRAM(s) Oral every 12 hours  glucagon  Injectable 1 milliGRAM(s) IntraMuscular once  influenza   Vaccine 0.5 milliLiter(s) IntraMuscular once  insulin glargine Injectable (LANTUS) 21 Unit(s) SubCutaneous at bedtime  insulin lispro (ADMELOG) corrective regimen sliding scale   SubCutaneous three times a day before meals  insulin lispro Injectable (ADMELOG) 7 Unit(s) SubCutaneous three times a day before meals  iron sucrose Injectable 100 milliGRAM(s) IV Push <User Schedule>  multivitamin 1 Tablet(s) Oral daily    PRN MEDICATIONS  simethicone 80 milliGRAM(s) Chew four times a day PRN      VITAL SIGNS: Last 24 Hours  T(C): 35.9 (07 Nov 2021 04:35), Max: 36.2 (06 Nov 2021 20:15)  T(F): 96.7 (07 Nov 2021 04:35), Max: 97.2 (06 Nov 2021 20:15)  HR: 91 (07 Nov 2021 04:35) (91 - 111)  BP: 135/70 (07 Nov 2021 04:35) (105/61 - 156/74)  BP(mean): --  RR: 18 (07 Nov 2021 04:35) (18 - 18)  SpO2: --    LABS:                        9.4    7.42  )-----------( 149      ( 07 Nov 2021 04:30 )             30.7     11-07    140  |  103  |  72<HH>  ----------------------------<  98  4.5   |  20  |  6.8<HH>    Ca    8.1<L>      07 Nov 2021 04:30  Phos  5.9     11-07  Mg     1.7     11-07    TPro  5.8<L>  /  Alb  3.3<L>  /  TBili  0.2  /  DBili  x   /  AST  14  /  ALT  15  /  AlkPhos  124<H>  11-07                  RADIOLOGY:      PHYSICAL EXAM:  GENERAL: NAD, well-developed, AAOx3  HEENT:  Atraumatic, Normocephalic. EOMI, PERRLA, conjunctiva and sclera clear, No JVD  PULMONARY: Clear to auscultation bilaterally; No wheeze  CARDIOVASCULAR: Regular rate and rhythm; No murmurs, rubs, or gallops  GASTROINTESTINAL: Soft, Nontender, Nondistended; Bowel sounds present  MUSCULOSKELETAL:  2+ Peripheral Pulses, No clubbing, cyanosis, or edema  NEUROLOGY: non-focal  SKIN: No rashes or lesions      ASSESSMENT & PLAN  55 yo M with PMH of DM, CAD s/p Stents, ESRD on HD sat/tu/th presented from [facility] for eval of agitation. As per NH pt became agitated and refused medications and dialysis yesterday. Per patient he had inadequate care at NH and wanted to go home with home care.    # ESRD on HD (TTS). No current indication for emergent HD. Renal consult for in-pt HD. Restart home meds.     # Diabetes mellitus with hyperglycemia. A1c. FSs. Insulin standing/correctional dosing    # Normocytic anemia, at goal for ESRD pt. Pt denies any bleeding symptoms. Replace as necessary.     # HAGMA (High AG Metabolic Acidosis) from uremic acidosis. resolved HD tomorrow     # Hx of CAD s/p Stents. plavix and Lipitor     # H/O DVT: per patient he was on coumadin for 15 years, recently switched to [facility]     PT/ social work consult for placement     DVT PPX.    ready for discharge from medical standpoint, pending discharge plan, patient stated does not want to go back to [facility] and he wants to do HHD     pending: social work and CM f/u

## 2021-11-08 LAB
ALBUMIN SERPL ELPH-MCNC: 3.3 G/DL — LOW (ref 3.5–5.2)
ALP SERPL-CCNC: 127 U/L — HIGH (ref 30–115)
ALT FLD-CCNC: 16 U/L — SIGNIFICANT CHANGE UP (ref 0–41)
ANION GAP SERPL CALC-SCNC: 19 MMOL/L — HIGH (ref 7–14)
AST SERPL-CCNC: 14 U/L — SIGNIFICANT CHANGE UP (ref 0–41)
BASOPHILS # BLD AUTO: 0.04 K/UL — SIGNIFICANT CHANGE UP (ref 0–0.2)
BASOPHILS NFR BLD AUTO: 0.6 % — SIGNIFICANT CHANGE UP (ref 0–1)
BILIRUB SERPL-MCNC: 0.2 MG/DL — SIGNIFICANT CHANGE UP (ref 0.2–1.2)
BUN SERPL-MCNC: 80 MG/DL — CRITICAL HIGH (ref 10–20)
CALCIUM SERPL-MCNC: 7.9 MG/DL — LOW (ref 8.5–10.1)
CHLORIDE SERPL-SCNC: 104 MMOL/L — SIGNIFICANT CHANGE UP (ref 98–110)
CO2 SERPL-SCNC: 16 MMOL/L — LOW (ref 17–32)
CREAT SERPL-MCNC: 7.5 MG/DL — CRITICAL HIGH (ref 0.7–1.5)
EOSINOPHIL # BLD AUTO: 0.54 K/UL — SIGNIFICANT CHANGE UP (ref 0–0.7)
EOSINOPHIL NFR BLD AUTO: 7.8 % — SIGNIFICANT CHANGE UP (ref 0–8)
GLUCOSE BLDC GLUCOMTR-MCNC: 133 MG/DL — HIGH (ref 70–99)
GLUCOSE BLDC GLUCOMTR-MCNC: 134 MG/DL — HIGH (ref 70–99)
GLUCOSE BLDC GLUCOMTR-MCNC: 156 MG/DL — HIGH (ref 70–99)
GLUCOSE BLDC GLUCOMTR-MCNC: 169 MG/DL — HIGH (ref 70–99)
GLUCOSE BLDC GLUCOMTR-MCNC: 186 MG/DL — HIGH (ref 70–99)
GLUCOSE SERPL-MCNC: 177 MG/DL — HIGH (ref 70–99)
HCT VFR BLD CALC: 29.8 % — LOW (ref 42–52)
HGB BLD-MCNC: 9.4 G/DL — LOW (ref 14–18)
IMM GRANULOCYTES NFR BLD AUTO: 0.4 % — HIGH (ref 0.1–0.3)
LYMPHOCYTES # BLD AUTO: 1.01 K/UL — LOW (ref 1.2–3.4)
LYMPHOCYTES # BLD AUTO: 14.5 % — LOW (ref 20.5–51.1)
MCHC RBC-ENTMCNC: 28.1 PG — SIGNIFICANT CHANGE UP (ref 27–31)
MCHC RBC-ENTMCNC: 31.5 G/DL — LOW (ref 32–37)
MCV RBC AUTO: 89.2 FL — SIGNIFICANT CHANGE UP (ref 80–94)
MONOCYTES # BLD AUTO: 0.45 K/UL — SIGNIFICANT CHANGE UP (ref 0.1–0.6)
MONOCYTES NFR BLD AUTO: 6.5 % — SIGNIFICANT CHANGE UP (ref 1.7–9.3)
NEUTROPHILS # BLD AUTO: 4.88 K/UL — SIGNIFICANT CHANGE UP (ref 1.4–6.5)
NEUTROPHILS NFR BLD AUTO: 70.2 % — SIGNIFICANT CHANGE UP (ref 42.2–75.2)
NRBC # BLD: 0 /100 WBCS — SIGNIFICANT CHANGE UP (ref 0–0)
PHOSPHATE SERPL-MCNC: 6.8 MG/DL — HIGH (ref 2.1–4.9)
PLATELET # BLD AUTO: 145 K/UL — SIGNIFICANT CHANGE UP (ref 130–400)
POTASSIUM SERPL-MCNC: 4.6 MMOL/L — SIGNIFICANT CHANGE UP (ref 3.5–5)
POTASSIUM SERPL-SCNC: 4.6 MMOL/L — SIGNIFICANT CHANGE UP (ref 3.5–5)
PROT SERPL-MCNC: 5.9 G/DL — LOW (ref 6–8)
RBC # BLD: 3.34 M/UL — LOW (ref 4.7–6.1)
RBC # FLD: 13.4 % — SIGNIFICANT CHANGE UP (ref 11.5–14.5)
SODIUM SERPL-SCNC: 139 MMOL/L — SIGNIFICANT CHANGE UP (ref 135–146)
WBC # BLD: 6.95 K/UL — SIGNIFICANT CHANGE UP (ref 4.8–10.8)
WBC # FLD AUTO: 6.95 K/UL — SIGNIFICANT CHANGE UP (ref 4.8–10.8)

## 2021-11-08 PROCEDURE — 99232 SBSQ HOSP IP/OBS MODERATE 35: CPT

## 2021-11-08 PROCEDURE — 93010 ELECTROCARDIOGRAM REPORT: CPT

## 2021-11-08 PROCEDURE — 71045 X-RAY EXAM CHEST 1 VIEW: CPT | Mod: 26

## 2021-11-08 RX ADMIN — Medication 667 MILLIGRAM(S): at 18:31

## 2021-11-08 RX ADMIN — GABAPENTIN 300 MILLIGRAM(S): 400 CAPSULE ORAL at 05:59

## 2021-11-08 RX ADMIN — Medication 7 UNIT(S): at 18:28

## 2021-11-08 RX ADMIN — Medication 1 APPLICATION(S): at 18:31

## 2021-11-08 RX ADMIN — Medication 667 MILLIGRAM(S): at 22:30

## 2021-11-08 RX ADMIN — Medication 1: at 08:29

## 2021-11-08 RX ADMIN — Medication 7 UNIT(S): at 12:31

## 2021-11-08 RX ADMIN — Medication 1 APPLICATION(S): at 05:59

## 2021-11-08 RX ADMIN — PREGABALIN 1000 MICROGRAM(S): 225 CAPSULE ORAL at 12:34

## 2021-11-08 RX ADMIN — APIXABAN 5 MILLIGRAM(S): 2.5 TABLET, FILM COATED ORAL at 18:31

## 2021-11-08 RX ADMIN — Medication 1: at 18:27

## 2021-11-08 RX ADMIN — GABAPENTIN 300 MILLIGRAM(S): 400 CAPSULE ORAL at 18:31

## 2021-11-08 RX ADMIN — INSULIN GLARGINE 21 UNIT(S): 100 INJECTION, SOLUTION SUBCUTANEOUS at 22:30

## 2021-11-08 RX ADMIN — CLOPIDOGREL BISULFATE 75 MILLIGRAM(S): 75 TABLET, FILM COATED ORAL at 12:35

## 2021-11-08 RX ADMIN — Medication 667 MILLIGRAM(S): at 12:33

## 2021-11-08 RX ADMIN — Medication 667 MILLIGRAM(S): at 08:28

## 2021-11-08 RX ADMIN — Medication 7 UNIT(S): at 08:29

## 2021-11-08 RX ADMIN — APIXABAN 5 MILLIGRAM(S): 2.5 TABLET, FILM COATED ORAL at 05:59

## 2021-11-08 RX ADMIN — ATORVASTATIN CALCIUM 40 MILLIGRAM(S): 80 TABLET, FILM COATED ORAL at 22:31

## 2021-11-08 RX ADMIN — Medication 1 TABLET(S): at 12:35

## 2021-11-08 NOTE — PROGRESS NOTE ADULT - ASSESSMENT
57 yo M with PMH of DM, CAD s/p Stents, ESRD on HD sat/tu/th presented from [facility] for eval of agitation. As per NH pt became agitated and refused medications and dialysis yesterday. Per patient he had inadequate care at NH and wanted to go home with home care.    # ESRD on HD (TTS). No current indication for emergent HD. Renal consult for in-pt HD. Restart home meds.     # Diabetes mellitus with hyperglycemia. A1c. FSs. Insulin standing/correctional dosing    # Normocytic anemia, at goal for ESRD pt. Pt denies any bleeding symptoms. Replace as necessary.     # HAGMA (High AG Metabolic Acidosis) from uremic acidosis. On HD.    # Hx of CAD s/p Stents. plavix and Lipitor     # H/O DVT: per patient he was on coumadin for 15 years, recently switched to Eliquis     PT/ social work consult for placement     DVT PPX.      Eliquis     #Progress Note Handoff:  Pending (specify): dc to different NH   Family discussion: d/w pt   Disposition: Home___/SNF___/Other________/Unknown at this time____x____      Jessie Narvaez, DO

## 2021-11-08 NOTE — PROGRESS NOTE ADULT - ASSESSMENT
57 yo M with PMH of DM, CAD s/p Stents, ESRD on HD  at [facility] presented from [facility] for eval of agitation. As per NH pt became agitated and refused medications .  patient had HD on thursday and 1 h yesterday   patient stated that he does not want to go back to [facility] and he wants to do HHD     # for hd today 3h opti 160 2l UF ,  #on venofer check iron stores , h/h at goal   #  ph level  noted , started pholso 1//1/1  # BP well controlled   #  evaluation for placement   # will follow

## 2021-11-08 NOTE — PROGRESS NOTE ADULT - SUBJECTIVE AND OBJECTIVE BOX
[facility]  56y, Male  Allergy: No Known Allergies    Hospital Day: 3d    Patient seen and examined earlier today. Feeling well, does not want to go back to [facility].     PMH/PSH:  PAST MEDICAL & SURGICAL HISTORY:  CAD (coronary artery disease)    DVT (deep venous thrombosis)    ESRD on dialysis    DM (diabetes mellitus)    H/O varicose vein stripping        LAST 24-Hr EVENTS:    VITALS:  T(F): 96.3 (11-08-21 @ 06:02), Max: 97.1 (11-07-21 @ 21:55)  HR: 94 (11-08-21 @ 16:00)  BP: 132/56 (11-08-21 @ 16:00) (94/56 - 147/77)  RR: 18 (11-08-21 @ 16:00)  SpO2: --        TESTS & MEASUREMENTS:  Weight (Kg):       11-08-21 @ 07:01  -  11-08-21 @ 19:25  --------------------------------------------------------  IN: 0 mL / OUT: 2000 mL / NET: -2000 mL                            9.4    6.95  )-----------( 145      ( 08 Nov 2021 08:00 )             29.8       11-08    139  |  104  |  80<HH>  ----------------------------<  177<H>  4.6   |  16<L>  |  7.5<HH>    Ca    7.9<L>      08 Nov 2021 08:00  Phos  6.8     11-08  Mg     1.7     11-07    TPro  5.9<L>  /  Alb  3.3<L>  /  TBili  0.2  /  DBili  x   /  AST  14  /  ALT  16  /  AlkPhos  127<H>  11-08    LIVER FUNCTIONS - ( 08 Nov 2021 08:00 )  Alb: 3.3 g/dL / Pro: 5.9 g/dL / ALK PHOS: 127 U/L / ALT: 16 U/L / AST: 14 U/L / GGT: x                           COVID-19 PCR: NotDetec (11-05-21 @ 16:21)      RADIOLOGY, ECG, & ADDITIONAL TESTS:      RECENT DIAGNOSTIC ORDERS:  Acute Hepatitis Panel: 16:00 (11-08-21 @ 10:57)  12 Lead ECG:   Provider's Contact #: [phone] (11-08-21 @ 10:57)      MEDICATIONS:  MEDICATIONS  (STANDING):  apixaban 5 milliGRAM(s) Oral two times a day  atorvastatin 40 milliGRAM(s) Oral at bedtime  BACItracin   Ointment 1 Application(s) Topical two times a day  calcitriol   Capsule 0.5 MICROGram(s) Oral <User Schedule>  calcium acetate 667 milliGRAM(s) Oral four times a day with meals  clopidogrel Tablet 75 milliGRAM(s) Oral daily  cyanocobalamin 1000 MICROGram(s) Oral daily  dextrose 40% Gel 15 Gram(s) Oral once  dextrose 5%. 1000 milliLiter(s) (100 mL/Hr) IV Continuous <Continuous>  dextrose 5%. 1000 milliLiter(s) (50 mL/Hr) IV Continuous <Continuous>  dextrose 50% Injectable 25 Gram(s) IV Push once  dextrose 50% Injectable 12.5 Gram(s) IV Push once  dextrose 50% Injectable 25 Gram(s) IV Push once  gabapentin 300 milliGRAM(s) Oral every 12 hours  glucagon  Injectable 1 milliGRAM(s) IntraMuscular once  influenza   Vaccine 0.5 milliLiter(s) IntraMuscular once  insulin glargine Injectable (LANTUS) 21 Unit(s) SubCutaneous at bedtime  insulin lispro (ADMELOG) corrective regimen sliding scale   SubCutaneous three times a day before meals  insulin lispro Injectable (ADMELOG) 7 Unit(s) SubCutaneous three times a day before meals  iron sucrose Injectable 100 milliGRAM(s) IV Push <User Schedule>  multivitamin 1 Tablet(s) Oral daily    MEDICATIONS  (PRN):  simethicone 80 milliGRAM(s) Chew four times a day PRN Dyspepsia      HOME MEDICATIONS:  Aranesp 40 mcg/0.4 mL injectable solution (11-05)  atorvastatin 40 mg oral tablet (11-05)  calcitriol 0.5 mcg oral capsule (11-05)  cyanocobalamin 100 mcg oral tablet (11-05)  Eliquis 5 mg oral tablet (11-05)  gabapentin 300 mg oral tablet (11-05)  Januvia 25 mg oral tablet (11-05)  Nephrocaps oral capsule (11-05)  Plavix 75 mg oral tablet (11-05)  simethicone 80 mg oral tablet, chewable (11-05)  Venofer 20 mg/mL intravenous solution (11-05)      PHYSICAL EXAM:  GENERAL: NAD, well-developed, AAOx3  HEENT:  Atraumatic, Normocephalic. EOMI, PERRLA, conjunctiva and sclera clear, No JVD  PULMONARY: Clear to auscultation bilaterally; No wheeze  CARDIOVASCULAR: Regular rate and rhythm; No murmurs, rubs, or gallops  GASTROINTESTINAL: Soft, Nontender, Nondistended; Bowel sounds present  MUSCULOSKELETAL:  2+ Peripheral Pulses, No clubbing, cyanosis, or edema  NEUROLOGY: non-focal  SKIN: No rashes or lesions

## 2021-11-08 NOTE — PROGRESS NOTE ADULT - SUBJECTIVE AND OBJECTIVE BOX
Nephrology progress note    THIS IS AN INCOMPLETE NOTE . FULL NOTE TO FOLLOW SHORTLY    Patient is seen and examined, events over the last 24 h noted .    Allergies:  No Known Allergies    Hospital Medications:   MEDICATIONS  (STANDING):  apixaban 5 milliGRAM(s) Oral two times a day  atorvastatin 40 milliGRAM(s) Oral at bedtime  BACItracin   Ointment 1 Application(s) Topical two times a day  calcitriol   Capsule 0.5 MICROGram(s) Oral <User Schedule>  calcium acetate 667 milliGRAM(s) Oral four times a day with meals  clopidogrel Tablet 75 milliGRAM(s) Oral daily  cyanocobalamin 1000 MICROGram(s) Oral daily  dextrose 40% Gel 15 Gram(s) Oral once  dextrose 5%. 1000 milliLiter(s) (50 mL/Hr) IV Continuous <Continuous>  dextrose 5%. 1000 milliLiter(s) (100 mL/Hr) IV Continuous <Continuous>  dextrose 50% Injectable 25 Gram(s) IV Push once  dextrose 50% Injectable 12.5 Gram(s) IV Push once  dextrose 50% Injectable 25 Gram(s) IV Push once  gabapentin 300 milliGRAM(s) Oral every 12 hours  glucagon  Injectable 1 milliGRAM(s) IntraMuscular once  influenza   Vaccine 0.5 milliLiter(s) IntraMuscular once  insulin glargine Injectable (LANTUS) 21 Unit(s) SubCutaneous at bedtime  insulin lispro (ADMELOG) corrective regimen sliding scale   SubCutaneous three times a day before meals  insulin lispro Injectable (ADMELOG) 7 Unit(s) SubCutaneous three times a day before meals  iron sucrose Injectable 100 milliGRAM(s) IV Push <User Schedule>  multivitamin 1 Tablet(s) Oral daily        VITALS:  T(F): 96.3 (11-08-21 @ 06:02), Max: 97.1 (11-07-21 @ 21:55)  HR: 91 (11-08-21 @ 06:02)  BP: 141/70 (11-08-21 @ 06:02)  RR: 18 (11-08-21 @ 06:02)  SpO2: --  Wt(kg): --        PHYSICAL EXAM:  Constitutional: NAD  HEENT: anicteric sclera, oropharynx clear, MMM  Neck: No JVD  Respiratory: CTAB, no wheezes, rales or rhonchi  Cardiovascular: S1, S2, RRR  Gastrointestinal: BS+, soft, NT/ND  Extremities: No cyanosis or clubbing. No peripheral edema  :  No callahan.   Skin: No rashes    LABS:  11-08    139  |  104  |  x   ----------------------------<  177<H>  4.6   |  16<L>  |  x     Ca    7.9<L>      08 Nov 2021 08:00  Phos  6.8     11-08  Mg     1.7     11-07    TPro  5.9<L>  /  Alb  3.3<L>  /  TBili  0.2  /  DBili      /  AST  14  /  ALT  16  /  AlkPhos  127<H>  11-08                          9.4    6.95  )-----------( 145      ( 08 Nov 2021 08:00 )             29.8       Urine Studies:      RADIOLOGY & ADDITIONAL STUDIES:   Nephrology progress note  Patient is seen and examined, events over the last 24 h noted .  No complaints no events     Allergies:  No Known Allergies    Hospital Medications:   MEDICATIONS  (STANDING):  apixaban 5 milliGRAM(s) Oral two times a day  atorvastatin 40 milliGRAM(s) Oral at bedtime  BACItracin   Ointment 1 Application(s) Topical two times a day  calcitriol   Capsule 0.5 MICROGram(s) Oral <User Schedule>  calcium acetate 667 milliGRAM(s) Oral four times a day with meals  clopidogrel Tablet 75 milliGRAM(s) Oral daily  cyanocobalamin 1000 MICROGram(s) Oral daily  gabapentin 300 milliGRAM(s) Oral every 12 hours  glucagon  Injectable 1 milliGRAM(s) IntraMuscular once  influenza   Vaccine 0.5 milliLiter(s) IntraMuscular once  insulin glargine Injectable (LANTUS) 21 Unit(s) SubCutaneous at bedtime  insulin lispro (ADMELOG) corrective regimen sliding scale   SubCutaneous three times a day before meals  insulin lispro Injectable (ADMELOG) 7 Unit(s) SubCutaneous three times a day before meals  iron sucrose Injectable 100 milliGRAM(s) IV Push <User Schedule>  multivitamin 1 Tablet(s) Oral daily        VITALS:  T(F): 96.3 (11-08-21 @ 06:02), Max: 97.1 (11-07-21 @ 21:55)  HR: 91 (11-08-21 @ 06:02)  BP: 141/70 (11-08-21 @ 06:02)  RR: 18 (11-08-21 @ 06:02)        PHYSICAL EXAM:  Constitutional: NAD  Neck: No JVD  Respiratory: CTAB, no wheezes, rales or rhonchi  Cardiovascular: S1, S2, RRR  Gastrointestinal: BS+, soft, NT/ND  Extremities: No cyanosis or clubbing. No peripheral edema  :  No callahan.   Skin: No rashes    LABS:  11-08    139  |  104  |  x   ----------------------------<  177<H>  4.6   |  16<L>  |  x     Ca    7.9<L>      08 Nov 2021 08:00  Phos  6.8     11-08  Mg     1.7     11-07    TPro  5.9<L>  /  Alb  3.3<L>  /  TBili  0.2  /  DBili      /  AST  14  /  ALT  16  /  AlkPhos  127<H>  11-08                            9.4    6.95  )-----------( 145      ( 08 Nov 2021 08:00 )             29.8       Urine Studies:      RADIOLOGY & ADDITIONAL STUDIES:

## 2021-11-09 LAB
ANION GAP SERPL CALC-SCNC: 16 MMOL/L — HIGH (ref 7–14)
BUN SERPL-MCNC: 46 MG/DL — HIGH (ref 10–20)
CALCIUM SERPL-MCNC: 8.2 MG/DL — LOW (ref 8.5–10.1)
CHLORIDE SERPL-SCNC: 101 MMOL/L — SIGNIFICANT CHANGE UP (ref 98–110)
CO2 SERPL-SCNC: 20 MMOL/L — SIGNIFICANT CHANGE UP (ref 17–32)
CREAT SERPL-MCNC: 5.3 MG/DL — CRITICAL HIGH (ref 0.7–1.5)
GLUCOSE BLDC GLUCOMTR-MCNC: 139 MG/DL — HIGH (ref 70–99)
GLUCOSE BLDC GLUCOMTR-MCNC: 144 MG/DL — HIGH (ref 70–99)
GLUCOSE BLDC GLUCOMTR-MCNC: 156 MG/DL — HIGH (ref 70–99)
GLUCOSE BLDC GLUCOMTR-MCNC: 160 MG/DL — HIGH (ref 70–99)
GLUCOSE BLDC GLUCOMTR-MCNC: 194 MG/DL — HIGH (ref 70–99)
GLUCOSE SERPL-MCNC: 211 MG/DL — HIGH (ref 70–99)
HAV IGM SER-ACNC: SIGNIFICANT CHANGE UP
HBV CORE IGM SER-ACNC: SIGNIFICANT CHANGE UP
HBV SURFACE AG SER-ACNC: SIGNIFICANT CHANGE UP
HCT VFR BLD CALC: 30.5 % — LOW (ref 42–52)
HCV AB S/CO SERPL IA: 0.12 S/CO — SIGNIFICANT CHANGE UP (ref 0–0.99)
HCV AB SERPL-IMP: SIGNIFICANT CHANGE UP
HGB BLD-MCNC: 9.5 G/DL — LOW (ref 14–18)
MAGNESIUM SERPL-MCNC: 1.9 MG/DL — SIGNIFICANT CHANGE UP (ref 1.8–2.4)
MCHC RBC-ENTMCNC: 28 PG — SIGNIFICANT CHANGE UP (ref 27–31)
MCHC RBC-ENTMCNC: 31.1 G/DL — LOW (ref 32–37)
MCV RBC AUTO: 90 FL — SIGNIFICANT CHANGE UP (ref 80–94)
NRBC # BLD: 0 /100 WBCS — SIGNIFICANT CHANGE UP (ref 0–0)
PLATELET # BLD AUTO: 155 K/UL — SIGNIFICANT CHANGE UP (ref 130–400)
POTASSIUM SERPL-MCNC: 4.3 MMOL/L — SIGNIFICANT CHANGE UP (ref 3.5–5)
POTASSIUM SERPL-SCNC: 4.3 MMOL/L — SIGNIFICANT CHANGE UP (ref 3.5–5)
RBC # BLD: 3.39 M/UL — LOW (ref 4.7–6.1)
RBC # FLD: 13.5 % — SIGNIFICANT CHANGE UP (ref 11.5–14.5)
SODIUM SERPL-SCNC: 137 MMOL/L — SIGNIFICANT CHANGE UP (ref 135–146)
WBC # BLD: 6.88 K/UL — SIGNIFICANT CHANGE UP (ref 4.8–10.8)
WBC # FLD AUTO: 6.88 K/UL — SIGNIFICANT CHANGE UP (ref 4.8–10.8)

## 2021-11-09 PROCEDURE — 99232 SBSQ HOSP IP/OBS MODERATE 35: CPT

## 2021-11-09 RX ORDER — LOPERAMIDE HCL 2 MG
2 TABLET ORAL ONCE
Refills: 0 | Status: COMPLETED | OUTPATIENT
Start: 2021-11-09 | End: 2021-11-09

## 2021-11-09 RX ADMIN — Medication 667 MILLIGRAM(S): at 12:02

## 2021-11-09 RX ADMIN — CLOPIDOGREL BISULFATE 75 MILLIGRAM(S): 75 TABLET, FILM COATED ORAL at 12:01

## 2021-11-09 RX ADMIN — Medication 1 TABLET(S): at 12:01

## 2021-11-09 RX ADMIN — APIXABAN 5 MILLIGRAM(S): 2.5 TABLET, FILM COATED ORAL at 06:26

## 2021-11-09 RX ADMIN — GABAPENTIN 300 MILLIGRAM(S): 400 CAPSULE ORAL at 07:43

## 2021-11-09 RX ADMIN — Medication 1: at 14:08

## 2021-11-09 RX ADMIN — Medication 1 APPLICATION(S): at 06:26

## 2021-11-09 RX ADMIN — Medication 7 UNIT(S): at 17:34

## 2021-11-09 RX ADMIN — CALCITRIOL 0.5 MICROGRAM(S): 0.5 CAPSULE ORAL at 12:02

## 2021-11-09 RX ADMIN — Medication 1: at 17:34

## 2021-11-09 RX ADMIN — GABAPENTIN 300 MILLIGRAM(S): 400 CAPSULE ORAL at 18:09

## 2021-11-09 RX ADMIN — Medication 7 UNIT(S): at 08:36

## 2021-11-09 RX ADMIN — Medication 2 MILLIGRAM(S): at 19:22

## 2021-11-09 RX ADMIN — INSULIN GLARGINE 21 UNIT(S): 100 INJECTION, SOLUTION SUBCUTANEOUS at 21:59

## 2021-11-09 RX ADMIN — APIXABAN 5 MILLIGRAM(S): 2.5 TABLET, FILM COATED ORAL at 18:09

## 2021-11-09 RX ADMIN — Medication 667 MILLIGRAM(S): at 18:09

## 2021-11-09 RX ADMIN — Medication 667 MILLIGRAM(S): at 09:18

## 2021-11-09 RX ADMIN — Medication 7 UNIT(S): at 14:09

## 2021-11-09 RX ADMIN — PREGABALIN 1000 MICROGRAM(S): 225 CAPSULE ORAL at 12:01

## 2021-11-09 NOTE — CONSULT NOTE ADULT - SUBJECTIVE AND OBJECTIVE BOX
HPI:  55 yo M with PMH of DM, CAD s/p Stents, ESRD on HD sat/tu/th presented from [facility] for eval of agitation. As per NH pt became agitated and refused medications and dialysis yesterday. Pt states this is false. He had dialysis yesterday, became hypotensive afterwards with an episode of nbnb diarrhea and requested help from a nurse at the NH who did not help him and refused to give him any meds last night or this morning leading to him requesting to be sent back to the hospital or home. Patient was recently admitted to [facility] for ESRD and was started on HD 6 weeks ago. Pt at baseline is unsteady on his feet d/t neuropathy and unable to go home independently so was sent to ED. At this time pt has no complaints but refuses to go back to [facility].  In the ED vitals were stable. Labs were significant for Hb 10.3, BUN/Cr of 49/4.9. Patient being admitted for placement.        PAST MEDICAL & SURGICAL HISTORY:  CAD (coronary artery disease)    DVT (deep venous thrombosis)    ESRD on dialysis    DM (diabetes mellitus)    H/O varicose vein stripping        Hospital Course:    TODAY'S SUBJECTIVE & REVIEW OF SYMPTOMS:     Constitutional WNL   Cardio WNL   Resp WNL   GI WNL  Heme WNL  Endo WNL  Skin WNL  MSK Weakness  Neuro WNL  Cognitive WNL  Psych WNL      MEDICATIONS  (STANDING):  apixaban 5 milliGRAM(s) Oral two times a day  atorvastatin 40 milliGRAM(s) Oral at bedtime  BACItracin   Ointment 1 Application(s) Topical two times a day  calcitriol   Capsule 0.5 MICROGram(s) Oral <User Schedule>  calcium acetate 667 milliGRAM(s) Oral four times a day with meals  clopidogrel Tablet 75 milliGRAM(s) Oral daily  cyanocobalamin 1000 MICROGram(s) Oral daily  dextrose 40% Gel 15 Gram(s) Oral once  dextrose 5%. 1000 milliLiter(s) (50 mL/Hr) IV Continuous <Continuous>  dextrose 5%. 1000 milliLiter(s) (100 mL/Hr) IV Continuous <Continuous>  dextrose 50% Injectable 25 Gram(s) IV Push once  dextrose 50% Injectable 12.5 Gram(s) IV Push once  dextrose 50% Injectable 25 Gram(s) IV Push once  gabapentin 300 milliGRAM(s) Oral every 12 hours  glucagon  Injectable 1 milliGRAM(s) IntraMuscular once  influenza   Vaccine 0.5 milliLiter(s) IntraMuscular once  insulin glargine Injectable (LANTUS) 21 Unit(s) SubCutaneous at bedtime  insulin lispro (ADMELOG) corrective regimen sliding scale   SubCutaneous three times a day before meals  insulin lispro Injectable (ADMELOG) 7 Unit(s) SubCutaneous three times a day before meals  iron sucrose Injectable 100 milliGRAM(s) IV Push <User Schedule>  multivitamin 1 Tablet(s) Oral daily    MEDICATIONS  (PRN):  simethicone 80 milliGRAM(s) Chew four times a day PRN Dyspepsia      FAMILY HISTORY:      Allergies    No Known Allergies    Intolerances        SOCIAL HISTORY:    [  ] Etoh  [  ] Smoking  [  ] Substance abuse     Home Environment:  [   ] Home Alone  [   ] Lives with Family  [   ] Home Health Aid    Dwelling:  [   ] Apartment  [   ] Private House  [   ] Adult Home  [   ] Skilled Nursing Facility      [ x  ] Short Term  [   ] Long Term  [   ] Stairs       Elevator [   ]    FUNCTIONAL STATUS PTA: (Check all that apply)  Ambulation: [    ]Independent    [ x  ] Dependent     [   ] Non-Ambulatory  Assistive Device: [   ] SA Cane  [   ]  Q Cane  [  x ] Walker  [   ]  Wheelchair  ADL : [   ] Independent  [ x   ]  Dependent       Vital Signs Last 24 Hrs  T(C): 36.4 (09 Nov 2021 04:57), Max: 37.4 (08 Nov 2021 20:17)  T(F): 97.5 (09 Nov 2021 04:57), Max: 99.4 (08 Nov 2021 20:17)  HR: 95 (09 Nov 2021 04:57) (94 - 106)  BP: 105/61 (09 Nov 2021 04:57) (105/61 - 147/77)  BP(mean): --  RR: 18 (09 Nov 2021 04:57) (18 - 18)  SpO2: --      PHYSICAL EXAM: Awake & Alert  GENERAL: NAD  HEAD:  Normocephalic  CHEST/LUNG: Clear   HEART: S1S2+  ABDOMEN: Soft, Nontender  EXTREMITIES:  no calf tenderness    NERVOUS SYSTEM:  Cranial Nerves 2-12 intact [   ] Abnormal  [   ]  ROM: WFL all extremities [ x  ]  Abnormal [   ]  Motor Strength: WFL all extremities  [   ]  Abnormal [ x  ]able to move UE>LE  Sensation: intact to light touch [   ] Abnormal [ x  ]decreased light touch distal LE    FUNCTIONAL STATUS:  Bed Mobility: Independent [   ]  Supervision [   ]  Needs Assistance [ x  ]  N/A [   ]  Transfers: Independent [   ]  Supervision [   ]  Needs Assistance [  x ]  N/A [   ]   Ambulation: Independent [   ]  Supervision [   ]  Needs Assistance [ x  ]  N/A [   ]  ADL: Independent [   ] Requires Assistance [   ] N/A [   ]      LABS:                        9.5    6.88  )-----------( 155      ( 09 Nov 2021 04:30 )             30.5     11-08    139  |  104  |  80<HH>  ----------------------------<  177<H>  4.6   |  16<L>  |  7.5<HH>    Ca    7.9<L>      08 Nov 2021 08:00  Phos  6.8     11-08    TPro  5.9<L>  /  Alb  3.3<L>  /  TBili  0.2  /  DBili  x   /  AST  14  /  ALT  16  /  AlkPhos  127<H>  11-08          RADIOLOGY & ADDITIONAL STUDIES:  
57 yo M with PMH of DM, CAD s/p Stents, ESRD on HD  at [facility] presented from [facility] for eval of agitation. As per NH pt became agitated and refused medications .  patient had HS on thursday and 1 h yesterday   patient stated that he does not want to go back to [facility] and he wants to do HHD         PAST HISTORY  --------------------------------------------------------------------------------  PAST MEDICAL & SURGICAL HISTORY:  CAD (coronary artery disease)    DVT (deep venous thrombosis)    ESRD on dialysis    DM (diabetes mellitus)    H/O varicose vein stripping      FAMILY HISTORY:    PAST SOCIAL HISTORY:    ALLERGIES & MEDICATIONS  --------------------------------------------------------------------------------  Allergies    No Known Allergies    Intolerances      Standing Inpatient Medications  apixaban 5 milliGRAM(s) Oral two times a day  atorvastatin 40 milliGRAM(s) Oral at bedtime  calcitriol   Capsule 0.5 MICROGram(s) Oral <User Schedule>  clopidogrel Tablet 75 milliGRAM(s) Oral daily  cyanocobalamin 1000 MICROGram(s) Oral daily  dextrose 40% Gel 15 Gram(s) Oral once  dextrose 5%. 1000 milliLiter(s) IV Continuous <Continuous>  dextrose 5%. 1000 milliLiter(s) IV Continuous <Continuous>  dextrose 50% Injectable 25 Gram(s) IV Push once  dextrose 50% Injectable 12.5 Gram(s) IV Push once  dextrose 50% Injectable 25 Gram(s) IV Push once  gabapentin 300 milliGRAM(s) Oral every 12 hours  glucagon  Injectable 1 milliGRAM(s) IntraMuscular once  influenza   Vaccine 0.5 milliLiter(s) IntraMuscular once  insulin glargine Injectable (LANTUS) 21 Unit(s) SubCutaneous at bedtime  insulin lispro (ADMELOG) corrective regimen sliding scale   SubCutaneous three times a day before meals  insulin lispro Injectable (ADMELOG) 7 Unit(s) SubCutaneous three times a day before meals  iron sucrose Injectable 100 milliGRAM(s) IV Push <User Schedule>  multivitamin 1 Tablet(s) Oral daily    PRN Inpatient Medications  simethicone 80 milliGRAM(s) Chew four times a day PRN        VITALS/PHYSICAL EXAM  --------------------------------------------------------------------------------  T(C): 35.9 (11-06-21 @ 14:21), Max: 36.4 (11-06-21 @ 04:20)  HR: 99 (11-06-21 @ 14:21) (95 - 101)  BP: 156/74 (11-06-21 @ 14:21) (119/61 - 156/74)  RR: 18 (11-06-21 @ 14:21) (18 - 18)  SpO2: --  Wt(kg): --        Physical Exam:  	Gen: NAD  	Pulm: CTA B/L  	CV: S1S2; no rub  	Abd: soft, nontender/nondistended  	LE:  no edema  	Vascular access:TESIO     LABS/STUDIES  --------------------------------------------------------------------------------              10.5   8.05  >-----------<  173      [11-06-21 @ 09:26]              33.8     142  |  103  |  60  ----------------------------<  142      [11-06-21 @ 09:26]  4.1   |  21  |  5.9        Ca     8.2     [11-06-21 @ 09:26]      Mg     1.8     [11-06-21 @ 09:26]      Phos  5.7     [11-06-21 @ 09:26]    TPro  6.8  /  Alb  3.8  /  TBili  0.3  /  DBili  x   /  AST  16  /  ALT  17  /  AlkPhos  171  [11-06-21 @ 09:26]          Creatinine Trend:  SCr 5.9 [11-06 @ 09:26]  SCr 4.9 [11-05 @ 15:55]

## 2021-11-09 NOTE — PROGRESS NOTE ADULT - ASSESSMENT
55 yo M with PMH of DM, CAD s/p Stents, ESRD on HD  at [facility] presented from [facility] for eval of agitation. As per NH pt became agitated and refused medications .  patient had HD on thursday and 1 h yesterday   patient stated that he does not want to go back to [facility] and he wants to do HHD     # s/p hd yesterday , no hd today   #on venofer check iron stores , h/h at goal   #  ph level  noted , started pholso 1//1/1  # on calcitriol , check PTH   # BP well controlled   #  evaluation for placement   # will follow

## 2021-11-09 NOTE — PROGRESS NOTE ADULT - SUBJECTIVE AND OBJECTIVE BOX
MABEL, [facility]  56y, Male  Allergy: No Known Allergies    Hospital Day: 4d    Patient seen and examined earlier today. Feeling well no complaints.     PMH/PSH:  PAST MEDICAL & SURGICAL HISTORY:  CAD (coronary artery disease)    DVT (deep venous thrombosis)    ESRD on dialysis    DM (diabetes mellitus)    H/O varicose vein stripping        LAST 24-Hr EVENTS:    VITALS:  T(F): 97.3 (11-09-21 @ 12:25), Max: 99.4 (11-08-21 @ 20:17)  HR: 99 (11-09-21 @ 12:25)  BP: 168/81 (11-09-21 @ 12:25) (105/61 - 168/81)  RR: 18 (11-09-21 @ 12:25)  SpO2: --        TESTS & MEASUREMENTS:  Weight (Kg):       11-08-21 @ 07:01  -  11-09-21 @ 07:00  --------------------------------------------------------  IN: 0 mL / OUT: 2000 mL / NET: -2000 mL                            9.5    6.88  )-----------( 155      ( 09 Nov 2021 04:30 )             30.5       11-09    137  |  101  |  46<H>  ----------------------------<  211<H>  4.3   |  20  |  5.3<HH>    Ca    8.2<L>      09 Nov 2021 04:30  Phos  6.8     11-08  Mg     1.9     11-09    TPro  5.9<L>  /  Alb  3.3<L>  /  TBili  0.2  /  DBili  x   /  AST  14  /  ALT  16  /  AlkPhos  127<H>  11-08    LIVER FUNCTIONS - ( 08 Nov 2021 08:00 )  Alb: 3.3 g/dL / Pro: 5.9 g/dL / ALK PHOS: 127 U/L / ALT: 16 U/L / AST: 14 U/L / GGT: x                           COVID-19 PCR: NotDetec (11-05-21 @ 16:21)      RADIOLOGY, ECG, & ADDITIONAL TESTS:      RECENT DIAGNOSTIC ORDERS:      MEDICATIONS:  MEDICATIONS  (STANDING):  apixaban 5 milliGRAM(s) Oral two times a day  atorvastatin 40 milliGRAM(s) Oral at bedtime  BACItracin   Ointment 1 Application(s) Topical two times a day  calcitriol   Capsule 0.5 MICROGram(s) Oral <User Schedule>  calcium acetate 667 milliGRAM(s) Oral four times a day with meals  clopidogrel Tablet 75 milliGRAM(s) Oral daily  cyanocobalamin 1000 MICROGram(s) Oral daily  dextrose 40% Gel 15 Gram(s) Oral once  dextrose 5%. 1000 milliLiter(s) (50 mL/Hr) IV Continuous <Continuous>  dextrose 5%. 1000 milliLiter(s) (100 mL/Hr) IV Continuous <Continuous>  dextrose 50% Injectable 25 Gram(s) IV Push once  dextrose 50% Injectable 12.5 Gram(s) IV Push once  dextrose 50% Injectable 25 Gram(s) IV Push once  gabapentin 300 milliGRAM(s) Oral every 12 hours  glucagon  Injectable 1 milliGRAM(s) IntraMuscular once  influenza   Vaccine 0.5 milliLiter(s) IntraMuscular once  insulin glargine Injectable (LANTUS) 21 Unit(s) SubCutaneous at bedtime  insulin lispro (ADMELOG) corrective regimen sliding scale   SubCutaneous three times a day before meals  insulin lispro Injectable (ADMELOG) 7 Unit(s) SubCutaneous three times a day before meals  iron sucrose Injectable 100 milliGRAM(s) IV Push <User Schedule>  multivitamin 1 Tablet(s) Oral daily    MEDICATIONS  (PRN):  simethicone 80 milliGRAM(s) Chew four times a day PRN Dyspepsia      HOME MEDICATIONS:  Aranesp 40 mcg/0.4 mL injectable solution (11-05)  atorvastatin 40 mg oral tablet (11-05)  calcitriol 0.5 mcg oral capsule (11-05)  cyanocobalamin 100 mcg oral tablet (11-05)  Eliquis 5 mg oral tablet (11-05)  gabapentin 300 mg oral tablet (11-05)  Januvia 25 mg oral tablet (11-05)  Nephrocaps oral capsule (11-05)  Plavix 75 mg oral tablet (11-05)  simethicone 80 mg oral tablet, chewable (11-05)  Venofer 20 mg/mL intravenous solution (11-05)      PHYSICAL EXAM:  GENERAL: NAD, well-developed, AAOx3  HEENT:  Atraumatic, Normocephalic. EOMI, PERRLA, conjunctiva and sclera clear, No JVD  PULMONARY: Clear to auscultation bilaterally; No wheeze  CARDIOVASCULAR: Regular rate and rhythm; No murmurs, rubs, or gallops  GASTROINTESTINAL: Soft, Nontender, Nondistended; Bowel sounds present  MUSCULOSKELETAL:  2+ Peripheral Pulses, No clubbing, cyanosis, or edema  NEUROLOGY: non-focal  SKIN: No rashes or lesions

## 2021-11-09 NOTE — PROGRESS NOTE ADULT - ASSESSMENT
55 yo M with PMH of DM, CAD s/p Stents, ESRD on HD sat/tu/th presented from [facility] for eval of agitation. As per NH pt became agitated and refused medications and dialysis yesterday. Per patient he had inadequate care at NH and wanted to go home with home care.    # ESRD on HD (TTS).  Renal consult for in-pt HD. Restart home meds.     # Diabetes mellitus with hyperglycemia. A1c. FSs. Insulin standing/correctional dosing    # Normocytic anemia, at goal for ESRD pt. Pt denies any bleeding symptoms. Replace as necessary.     # HAGMA (High AG Metabolic Acidosis) from uremic acidosis. On HD.    # Hx of CAD s/p Stents. plavix and Lipitor     # H/O DVT: per patient he was on coumadin for 15 years, recently switched to Eliquis     PT/ social work consult for placement       Eliquis     #Progress Note Handoff:  Pending (specify): dc to different NH , hep panel resulted and negative   Family discussion: d/w pt   Disposition: Home___/SNF___/Other________/Unknown at this time____x____      Jessie Narvaez, DO

## 2021-11-09 NOTE — PROGRESS NOTE ADULT - SUBJECTIVE AND OBJECTIVE BOX
seen and examined  no distress   lying comfortable         PAST HISTORY  --------------------------------------------------------------------------------  No significant changes to PMH, PSH, FHx, SHx, unless otherwise noted    ALLERGIES & MEDICATIONS  --------------------------------------------------------------------------------  Allergies    No Known Allergies    Intolerances      Standing Inpatient Medications  apixaban 5 milliGRAM(s) Oral two times a day  atorvastatin 40 milliGRAM(s) Oral at bedtime  BACItracin   Ointment 1 Application(s) Topical two times a day  calcitriol   Capsule 0.5 MICROGram(s) Oral <User Schedule>  calcium acetate 667 milliGRAM(s) Oral four times a day with meals  clopidogrel Tablet 75 milliGRAM(s) Oral daily  cyanocobalamin 1000 MICROGram(s) Oral daily  dextrose 40% Gel 15 Gram(s) Oral once  dextrose 5%. 1000 milliLiter(s) IV Continuous <Continuous>  dextrose 5%. 1000 milliLiter(s) IV Continuous <Continuous>  dextrose 50% Injectable 25 Gram(s) IV Push once  dextrose 50% Injectable 12.5 Gram(s) IV Push once  dextrose 50% Injectable 25 Gram(s) IV Push once  gabapentin 300 milliGRAM(s) Oral every 12 hours  glucagon  Injectable 1 milliGRAM(s) IntraMuscular once  influenza   Vaccine 0.5 milliLiter(s) IntraMuscular once  insulin glargine Injectable (LANTUS) 21 Unit(s) SubCutaneous at bedtime  insulin lispro (ADMELOG) corrective regimen sliding scale   SubCutaneous three times a day before meals  insulin lispro Injectable (ADMELOG) 7 Unit(s) SubCutaneous three times a day before meals  iron sucrose Injectable 100 milliGRAM(s) IV Push <User Schedule>  multivitamin 1 Tablet(s) Oral daily    PRN Inpatient Medications  simethicone 80 milliGRAM(s) Chew four times a day PRN        VITALS/PHYSICAL EXAM  --------------------------------------------------------------------------------  T(C): 36.4 (11-09-21 @ 04:57), Max: 37.4 (11-08-21 @ 20:17)  HR: 95 (11-09-21 @ 04:57) (94 - 106)  BP: 105/61 (11-09-21 @ 04:57) (105/61 - 147/77)  RR: 18 (11-09-21 @ 04:57) (18 - 18)  SpO2: --  Wt(kg): --        11-08-21 @ 07:01  -  11-09-21 @ 07:00  --------------------------------------------------------  IN: 0 mL / OUT: 2000 mL / NET: -2000 mL      Physical Exam:  	Gen: NAD,  	Pulm: CTA B/L  	CV:  S1S2; no rub  	Abd:  soft, nontender/nondistended  	Vascular access:tesio     LABS/STUDIES  --------------------------------------------------------------------------------              9.4    6.95  >-----------<  145      [11-08-21 @ 08:00]              29.8     139  |  104  |  80  ----------------------------<  177      [11-08-21 @ 08:00]  4.6   |  16  |  7.5        Ca     7.9     [11-08-21 @ 08:00]      Phos  6.8     [11-08-21 @ 08:00]    TPro  5.9  /  Alb  3.3  /  TBili  0.2  /  DBili  x   /  AST  14  /  ALT  16  /  AlkPhos  127  [11-08-21 @ 08:00]          Creatinine Trend:  SCr 7.5 [11-08 @ 08:00]  SCr 6.8 [11-07 @ 04:30]  SCr 6.2 [11-06 @ 16:00]  SCr 5.9 [11-06 @ 09:26]  SCr 4.9 [11-05 @ 15:55]          HBsAg Nonreact      [11-08-21 @ 15:44]  HCV 0.12, Nonreact      [11-08-21 @ 15:44]

## 2021-11-10 LAB
GLUCOSE BLDC GLUCOMTR-MCNC: 117 MG/DL — HIGH (ref 70–99)
GLUCOSE BLDC GLUCOMTR-MCNC: 126 MG/DL — HIGH (ref 70–99)
GLUCOSE BLDC GLUCOMTR-MCNC: 141 MG/DL — HIGH (ref 70–99)
GLUCOSE BLDC GLUCOMTR-MCNC: 145 MG/DL — HIGH (ref 70–99)
SARS-COV-2 RNA SPEC QL NAA+PROBE: SIGNIFICANT CHANGE UP

## 2021-11-10 PROCEDURE — 99232 SBSQ HOSP IP/OBS MODERATE 35: CPT

## 2021-11-10 RX ADMIN — Medication 667 MILLIGRAM(S): at 22:23

## 2021-11-10 RX ADMIN — ATORVASTATIN CALCIUM 40 MILLIGRAM(S): 80 TABLET, FILM COATED ORAL at 22:40

## 2021-11-10 RX ADMIN — Medication 1 TABLET(S): at 11:44

## 2021-11-10 RX ADMIN — PREGABALIN 1000 MICROGRAM(S): 225 CAPSULE ORAL at 11:44

## 2021-11-10 RX ADMIN — APIXABAN 5 MILLIGRAM(S): 2.5 TABLET, FILM COATED ORAL at 07:00

## 2021-11-10 RX ADMIN — Medication 667 MILLIGRAM(S): at 00:57

## 2021-11-10 RX ADMIN — Medication 7 UNIT(S): at 11:45

## 2021-11-10 RX ADMIN — Medication 667 MILLIGRAM(S): at 11:44

## 2021-11-10 RX ADMIN — ATORVASTATIN CALCIUM 40 MILLIGRAM(S): 80 TABLET, FILM COATED ORAL at 00:57

## 2021-11-10 RX ADMIN — GABAPENTIN 300 MILLIGRAM(S): 400 CAPSULE ORAL at 07:00

## 2021-11-10 RX ADMIN — CLOPIDOGREL BISULFATE 75 MILLIGRAM(S): 75 TABLET, FILM COATED ORAL at 11:44

## 2021-11-10 RX ADMIN — GABAPENTIN 300 MILLIGRAM(S): 400 CAPSULE ORAL at 17:24

## 2021-11-10 RX ADMIN — Medication 7 UNIT(S): at 17:18

## 2021-11-10 RX ADMIN — Medication 667 MILLIGRAM(S): at 17:20

## 2021-11-10 RX ADMIN — Medication 7 UNIT(S): at 07:57

## 2021-11-10 RX ADMIN — INSULIN GLARGINE 21 UNIT(S): 100 INJECTION, SOLUTION SUBCUTANEOUS at 22:23

## 2021-11-10 RX ADMIN — APIXABAN 5 MILLIGRAM(S): 2.5 TABLET, FILM COATED ORAL at 17:24

## 2021-11-10 RX ADMIN — Medication 667 MILLIGRAM(S): at 07:56

## 2021-11-10 RX ADMIN — Medication 1 APPLICATION(S): at 17:25

## 2021-11-10 NOTE — PROGRESS NOTE ADULT - SUBJECTIVE AND OBJECTIVE BOX
ALEXANDRO MONROE  56y  Male      Patient is a 56y old  Male who presents with a chief complaint of Inadequate social support (10 Nov 2021 10:33)      INTERVAL HPI/OVERNIGHT EVENTS: no acute events overnight. no major complaints. no nursing concerns.       REVIEW OF SYSTEMS:  CONSTITUTIONAL: No fever, weight loss, or fatigue  RESPIRATORY: No cough, wheezing, chills or hemoptysis; No shortness of breath  CARDIOVASCULAR: No chest pain, palpitations, dizziness, or leg swelling  GASTROINTESTINAL: No abdominal or epigastric pain. No nausea, vomiting, or hematemesis; No diarrhea or constipation. No melena or hematochezia.  GENITOURINARY: No dysuria, frequency, hematuria, or incontinence  NEUROLOGICAL: No headaches, memory loss, loss of strength, numbness, or tremors  SKIN: No itching, burning, rashes, or lesions   MUSCULOSKELETAL: No joint pain or swelling; No muscle, back, or extremity pain  PSYCHIATRIC: No depression, anxiety, mood swings, or difficulty sleeping  All other review of systems negative    VITALS  T(C): 35.6 (11-10-21 @ 05:11), Max: 37.3 (11-09-21 @ 21:24)  HR: 86 (11-10-21 @ 05:11) (86 - 99)  BP: 143/68 (11-10-21 @ 05:11) (120/62 - 168/81)  RR: 18 (11-10-21 @ 05:11) (18 - 18)  SpO2: --  Wt(kg): --Vital Signs Last 24 Hrs  T(C): 35.6 (10 Nov 2021 05:11), Max: 37.3 (09 Nov 2021 21:24)  T(F): 96 (10 Nov 2021 05:11), Max: 99.2 (09 Nov 2021 21:24)  HR: 86 (10 Nov 2021 05:11) (86 - 99)  BP: 143/68 (10 Nov 2021 05:11) (120/62 - 168/81)  BP(mean): 99 (10 Nov 2021 05:11) (99 - 99)  RR: 18 (10 Nov 2021 05:11) (18 - 18)  SpO2: --      11-09-21 @ 07:01  -  11-10-21 @ 07:00  --------------------------------------------------------  IN: 0 mL / OUT: 600 mL / NET: -600 mL        PHYSICAL EXAM:  GENERAL: NAD, well-groomed, well-developed  PSYCH: no agitation, baseline mentation  NERVOUS SYSTEM:  Alert & Oriented X3, Motor Strength 5/5 B/L upper and lower extremities; Sensory intact; FTN WNL  PULMONARY: Clear to percussion bilaterally; No rales, rhonchi, wheezing, or rubs  CARDIOVASCULAR: Regular rate and rhythm; No murmurs, rubs, or gallops  GI: Soft, Nontender, Nondistended; Bowel sounds present  EXTREMITIES:  2+ Peripheral Pulses, No clubbing, cyanosis, or edema  LYMPH: No lymphadenopathy noted  SKIN: No rashes or lesions    Consultant(s) Notes Reviewed:  [x ] YES  [ ] NO    Discussed with Consultants/Other Providers [ x] YES     LABS                          9.5    6.88  )-----------( 155      ( 09 Nov 2021 04:30 )             30.5     11-09    137  |  101  |  46<H>  ----------------------------<  211<H>  4.3   |  20  |  5.3<HH>    Ca    8.2<L>      09 Nov 2021 04:30  Mg     1.9     11-09        POCT Blood Glucose.: 141 mg/dL (10 Nov 2021 07:34)    RADIOLOGY & ADDITIONAL TESTS:  - no images 11/10    Imaging Personally Reviewed:  [ ] YES  [ ] NO    HEALTH ISSUES - PROBLEM Dx:      MEDICATIONS  (STANDING):  apixaban 5 milliGRAM(s) Oral two times a day  atorvastatin 40 milliGRAM(s) Oral at bedtime  BACItracin   Ointment 1 Application(s) Topical two times a day  calcitriol   Capsule 0.5 MICROGram(s) Oral <User Schedule>  calcium acetate 667 milliGRAM(s) Oral four times a day with meals  clopidogrel Tablet 75 milliGRAM(s) Oral daily  cyanocobalamin 1000 MICROGram(s) Oral daily  dextrose 40% Gel 15 Gram(s) Oral once  dextrose 5%. 1000 milliLiter(s) (50 mL/Hr) IV Continuous <Continuous>  dextrose 5%. 1000 milliLiter(s) (100 mL/Hr) IV Continuous <Continuous>  dextrose 50% Injectable 25 Gram(s) IV Push once  dextrose 50% Injectable 12.5 Gram(s) IV Push once  dextrose 50% Injectable 25 Gram(s) IV Push once  gabapentin 300 milliGRAM(s) Oral every 12 hours  glucagon  Injectable 1 milliGRAM(s) IntraMuscular once  influenza   Vaccine 0.5 milliLiter(s) IntraMuscular once  insulin glargine Injectable (LANTUS) 21 Unit(s) SubCutaneous at bedtime  insulin lispro (ADMELOG) corrective regimen sliding scale   SubCutaneous three times a day before meals  insulin lispro Injectable (ADMELOG) 7 Unit(s) SubCutaneous three times a day before meals  iron sucrose Injectable 100 milliGRAM(s) IV Push <User Schedule>  multivitamin 1 Tablet(s) Oral daily    MEDICATIONS  (PRN):  simethicone 80 milliGRAM(s) Chew four times a day PRN Dyspepsia

## 2021-11-10 NOTE — PROGRESS NOTE ADULT - SUBJECTIVE AND OBJECTIVE BOX
Nephrology Progress Note    ALEXANDRO MONROE  [id number]  56y  Male    S:  Patient is seen and examined, events over the last 24h noted.    O:  Allergies:  No Known Allergies    Hospital Medications:   MEDICATIONS  (STANDING):  apixaban 5 milliGRAM(s) Oral two times a day  atorvastatin 40 milliGRAM(s) Oral at bedtime  BACItracin   Ointment 1 Application(s) Topical two times a day  calcitriol   Capsule 0.5 MICROGram(s) Oral <User Schedule>  calcium acetate 667 milliGRAM(s) Oral four times a day with meals  clopidogrel Tablet 75 milliGRAM(s) Oral daily  cyanocobalamin 1000 MICROGram(s) Oral daily  gabapentin 300 milliGRAM(s) Oral every 12 hours  influenza   Vaccine 0.5 milliLiter(s) IntraMuscular once  insulin glargine Injectable (LANTUS) 21 Unit(s) SubCutaneous at bedtime  insulin lispro (ADMELOG) corrective regimen sliding scale   SubCutaneous three times a day before meals  insulin lispro Injectable (ADMELOG) 7 Unit(s) SubCutaneous three times a day before meals  iron sucrose Injectable 100 milliGRAM(s) IV Push <User Schedule>  multivitamin 1 Tablet(s) Oral daily    MEDICATIONS  (PRN):  simethicone 80 milliGRAM(s) Chew four times a day PRN Dyspepsia    Home Medications:  Aranesp 40 mcg/0.4 mL injectable solution: 0.4 milliliter(s) injectable once a week thursday (05 Nov 2021 21:40)  atorvastatin 40 mg oral tablet: 1 tab(s) orally once a day (05 Nov 2021 21:42)  calcitriol 0.5 mcg oral capsule: 1 cap(s) orally Tuesday, Thursday, Saturday (05 Nov 2021 21:42)  cyanocobalamin 100 mcg oral tablet: 1 tab(s) orally once a day (05 Nov 2021 21:42)  Eliquis 5 mg oral tablet: 1 tab(s) orally 2 times a day (05 Nov 2021 21:41)  gabapentin 300 mg oral tablet: 1 cap(s) orally every 12 hours (05 Nov 2021 21:40)  Januvia 25 mg oral tablet: 1 tab(s) orally once a day (05 Nov 2021 21:41)  Nephrocaps oral capsule: 1 cap(s) orally once a day (05 Nov 2021 21:41)  Plavix 75 mg oral tablet: 1 tab(s) orally once a day (05 Nov 2021 21:42)  simethicone 80 mg oral tablet, chewable: 1 tab(s) orally 4 times a day (after meals and at bedtime), As Needed (05 Nov 2021 21:41)  Venofer 20 mg/mL intravenous solution: 5 milliliter(s) intravenous Tuesday, Thursday, Sunday with HD (05 Nov 2021 21:38)      VITALS:  T(F): 96 (11-10-21 @ 05:11), Max: 99.2 (11-09-21 @ 21:24)  HR: 86 (11-10-21 @ 05:11)  BP: 143/68 (11-10-21 @ 05:11)  RR: 18 (11-10-21 @ 05:11)  SpO2: --  Wt(kg): --  I&O's Detail    09 Nov 2021 07:01  -  10 Nov 2021 07:00  --------------------------------------------------------  IN:  Total IN: 0 mL    OUT:    Voided (mL): 600 mL  Total OUT: 600 mL    Total NET: -600 mL        I&O's Summary    09 Nov 2021 07:01  -  10 Nov 2021 07:00  --------------------------------------------------------  IN: 0 mL / OUT: 600 mL / NET: -600 mL          PHYSICAL EXAM:  Gen: NAD  Resp: CTAB  Card: S1/S2  Abd: soft  Extremities: no edema  Vascular access: tesio      LABS:      11-09    137  |  101  |  46<H>  ----------------------------<  211<H>  4.3   |  20  |  5.3<HH>    Ca    8.2<L>      09 Nov 2021 04:30  Mg     1.9     11-09      eGFR if Non African American: 11 mL/min/1.73M2 (11-09-21 @ 04:30)  eGFR if African American: 13 mL/min/1.73M2 (11-09-21 @ 04:30)    Phosphorus Level, Serum: 6.8 mg/dL (11-08-21 @ 08:00)  Phosphorus Level, Serum: 5.9 mg/dL (11-07-21 @ 04:30)                            9.5    6.88  )-----------( 155      ( 09 Nov 2021 04:30 )             30.5     Mean Cell Volume: 90.0 fL (11-09-21 @ 04:30)        Creatinine trend:  Creatinine, Serum: 5.3 mg/dL (11-09-21 @ 04:30)  Creatinine, Serum: 7.5 mg/dL (11-08-21 @ 08:00)  Creatinine, Serum: 6.8 mg/dL (11-07-21 @ 04:30)  Creatinine, Serum: 6.2 mg/dL (11-06-21 @ 16:00)  Creatinine, Serum: 5.9 mg/dL (11-06-21 @ 09:26)  Creatinine, Serum: 4.9 mg/dL (11-05-21 @ 15:55)    Hepatitis B Surface Antigen: Nonreact (11-08-21 @ 15:44)  Hepatitis C Virus S/CO Ratio: 0.12 S/CO (11-08-21 @ 15:44)  Hepatitis B Core IgM Antibody: Nonreact (11-08-21 @ 15:44)

## 2021-11-10 NOTE — PROGRESS NOTE ADULT - ASSESSMENT
57 yo M with PMH of DM, CAD s/p Stents, ESRD on HD sat/tu/th presented from [facility] for eval of agitation. As per NH pt became agitated and refused medications and dialysis yesterday. Per patient he had inadequate care at NH and wanted to go home with home care.    # ESRD on HD (TTS).       - Renal consult for in-pt HD      - HD today, 3hrs, 2L UF      - on venofer; check iron stores , h/h at goal       - last ph level noted, started pholso 1 tab qac.  Check iPTH      - BP controlled    # Diabetes mellitus with hyperglycemia  - A1c: 5.7  - FSs. Insulin standing/correctional dosing    # Normocytic anemia, at goal for ESRD pt.  -  Pt denies any bleeding symptoms. Replace as necessary.   - venofer as above    # HAGMA (High AG Metabolic Acidosis)  - from uremic acidosis. On HD.    # Hx of CAD s/p Stents.  - plavix and Lipitor     # H/O DVT: per patient he was on coumadin for 15 years, recently switched to Eliquis     PT/ social work consult for placement       Eliquis     #Progress Note Handoff:  Pending (specify): dc to different NH , hep panel resulted and negative   Family discussion: d/w pt   Disposition: agreeable to [facility]. Hep Panel and CXR sent to facility

## 2021-11-10 NOTE — PROGRESS NOTE ADULT - ASSESSMENT
57 yo M with PMH of DM, CAD s/p Stents, ESRD on HD  at [facility] presented from [facility] for eval of agitation. As per NH pt became agitated and refused medications.  Patient stated that he does not want to go back to [facility] and he wants to do HHD     # ESRD on HD  # MBD  # Normocytic anemia   # HTN    - HD today, 3hrs, 2L UF  - on venofer; check iron stores , h/h at goal   - last ph level noted, started pholso 1 tab qac.  Check iPTH  - BP controlled  - pending placement   55 yo M with PMH of DM, CAD s/p Stents, ESRD on HD  at [facility] presented from [facility] for eval of agitation. As per NH pt became agitated and refused medications.  Patient stated that he does not want to go back to [facility] and he wants to do HHD     # ESRD on HD  # MBD  # Normocytic anemia   # HTN    - HD scheduled for tomorrow, 3hrs, 2L UF (was planned for today, rescheduled, pt w/o urgent need for HD   - on venofer; check iron stores , h/h at goal   - last ph level noted, started pholso 1 tab qac.  Check iPTH  - BP controlled  - pending placement

## 2021-11-11 LAB
GLUCOSE BLDC GLUCOMTR-MCNC: 180 MG/DL — HIGH (ref 70–99)
GLUCOSE BLDC GLUCOMTR-MCNC: 196 MG/DL — HIGH (ref 70–99)
GLUCOSE BLDC GLUCOMTR-MCNC: 223 MG/DL — HIGH (ref 70–99)

## 2021-11-11 PROCEDURE — 99232 SBSQ HOSP IP/OBS MODERATE 35: CPT

## 2021-11-11 RX ADMIN — Medication 7 UNIT(S): at 08:35

## 2021-11-11 RX ADMIN — Medication 1 APPLICATION(S): at 06:05

## 2021-11-11 RX ADMIN — GABAPENTIN 300 MILLIGRAM(S): 400 CAPSULE ORAL at 06:05

## 2021-11-11 RX ADMIN — Medication 1: at 08:33

## 2021-11-11 RX ADMIN — APIXABAN 5 MILLIGRAM(S): 2.5 TABLET, FILM COATED ORAL at 16:53

## 2021-11-11 RX ADMIN — APIXABAN 5 MILLIGRAM(S): 2.5 TABLET, FILM COATED ORAL at 06:05

## 2021-11-11 RX ADMIN — IRON SUCROSE 100 MILLIGRAM(S): 20 INJECTION, SOLUTION INTRAVENOUS at 16:55

## 2021-11-11 RX ADMIN — CLOPIDOGREL BISULFATE 75 MILLIGRAM(S): 75 TABLET, FILM COATED ORAL at 14:21

## 2021-11-11 RX ADMIN — Medication 2: at 16:50

## 2021-11-11 RX ADMIN — GABAPENTIN 300 MILLIGRAM(S): 400 CAPSULE ORAL at 16:54

## 2021-11-11 RX ADMIN — Medication 7 UNIT(S): at 16:54

## 2021-11-11 RX ADMIN — INSULIN GLARGINE 21 UNIT(S): 100 INJECTION, SOLUTION SUBCUTANEOUS at 22:00

## 2021-11-11 RX ADMIN — PREGABALIN 1000 MICROGRAM(S): 225 CAPSULE ORAL at 14:23

## 2021-11-11 RX ADMIN — Medication 40 MICROGRAM(S): at 10:56

## 2021-11-11 RX ADMIN — Medication 667 MILLIGRAM(S): at 16:50

## 2021-11-11 RX ADMIN — Medication 1 TABLET(S): at 14:23

## 2021-11-11 RX ADMIN — Medication 667 MILLIGRAM(S): at 22:00

## 2021-11-11 RX ADMIN — CALCITRIOL 0.5 MICROGRAM(S): 0.5 CAPSULE ORAL at 14:22

## 2021-11-11 RX ADMIN — ATORVASTATIN CALCIUM 40 MILLIGRAM(S): 80 TABLET, FILM COATED ORAL at 22:00

## 2021-11-11 RX ADMIN — Medication 667 MILLIGRAM(S): at 14:22

## 2021-11-11 RX ADMIN — IRON SUCROSE 100 MILLIGRAM(S): 20 INJECTION, SOLUTION INTRAVENOUS at 10:54

## 2021-11-11 RX ADMIN — Medication 1 APPLICATION(S): at 16:54

## 2021-11-11 NOTE — PROGRESS NOTE ADULT - SUBJECTIVE AND OBJECTIVE BOX
Nephrology progress note    THIS IS AN INCOMPLETE NOTE . FULL NOTE TO FOLLOW SHORTLY    Patient is seen and examined, events over the last 24 h noted .    Allergies:  No Known Allergies    Hospital Medications:   MEDICATIONS  (STANDING):  apixaban 5 milliGRAM(s) Oral two times a day  atorvastatin 40 milliGRAM(s) Oral at bedtime  BACItracin   Ointment 1 Application(s) Topical two times a day  calcitriol   Capsule 0.5 MICROGram(s) Oral <User Schedule>  calcium acetate 667 milliGRAM(s) Oral four times a day with meals  clopidogrel Tablet 75 milliGRAM(s) Oral daily  cyanocobalamin 1000 MICROGram(s) Oral daily  darbepoetin Injectable Syringe 40 MICROGram(s) SubCutaneous every week  dextrose 40% Gel 15 Gram(s) Oral once  dextrose 5%. 1000 milliLiter(s) (50 mL/Hr) IV Continuous <Continuous>  dextrose 5%. 1000 milliLiter(s) (100 mL/Hr) IV Continuous <Continuous>  dextrose 50% Injectable 25 Gram(s) IV Push once  dextrose 50% Injectable 12.5 Gram(s) IV Push once  dextrose 50% Injectable 25 Gram(s) IV Push once  gabapentin 300 milliGRAM(s) Oral every 12 hours  glucagon  Injectable 1 milliGRAM(s) IntraMuscular once  influenza   Vaccine 0.5 milliLiter(s) IntraMuscular once  insulin glargine Injectable (LANTUS) 21 Unit(s) SubCutaneous at bedtime  insulin lispro (ADMELOG) corrective regimen sliding scale   SubCutaneous three times a day before meals  insulin lispro Injectable (ADMELOG) 7 Unit(s) SubCutaneous three times a day before meals  iron sucrose Injectable 100 milliGRAM(s) IV Push <User Schedule>  multivitamin 1 Tablet(s) Oral daily        VITALS:  T(F): 96.9 (11-11-21 @ 05:34), Max: 96.9 (11-10-21 @ 21:12)  HR: 86 (11-11-21 @ 05:34)  BP: 121/66 (11-11-21 @ 05:34)  RR: 18 (11-11-21 @ 05:34)  SpO2: 98% (11-11-21 @ 05:34)  Wt(kg): --    11-09 @ 07:01  -  11-10 @ 07:00  --------------------------------------------------------  IN: 0 mL / OUT: 600 mL / NET: -600 mL    11-10 @ 07:01  -  11-11 @ 07:00  --------------------------------------------------------  IN: 0 mL / OUT: 400 mL / NET: -400 mL      Height (cm): 182.9 (11-10 @ 13:59)  Weight (kg): 106.9 (11-10 @ 13:59)  BMI (kg/m2): 32 (11-10 @ 13:59)  BSA (m2): 2.28 (11-10 @ 13:59)    PHYSICAL EXAM:  Constitutional: NAD  HEENT: anicteric sclera, oropharynx clear, MMM  Neck: No JVD  Respiratory: CTAB, no wheezes, rales or rhonchi  Cardiovascular: S1, S2, RRR  Gastrointestinal: BS+, soft, NT/ND  Extremities: No cyanosis or clubbing. No peripheral edema  :  No callahan.   Skin: No rashes    LABS:            Urine Studies:      RADIOLOGY & ADDITIONAL STUDIES:   Nephrology progress note  Patient is seen and examined, events over the last 24 h noted .  denied any complaints    Allergies:  No Known Allergies    Hospital Medications:   MEDICATIONS  (STANDING):    apixaban 5 milliGRAM(s) Oral two times a day  atorvastatin 40 milliGRAM(s) Oral at bedtime  BACItracin   Ointment 1 Application(s) Topical two times a day  calcitriol   Capsule 0.5 MICROGram(s) Oral <User Schedule>  calcium acetate 667 milliGRAM(s) Oral four times a day with meals  clopidogrel Tablet 75 milliGRAM(s) Oral daily  cyanocobalamin 1000 MICROGram(s) Oral daily  darbepoetin Injectable Syringe 40 MICROGram(s) SubCutaneous every week  gabapentin 300 milliGRAM(s) Oral every 12 hours  glucagon  Injectable 1 milliGRAM(s) IntraMuscular once  influenza   Vaccine 0.5 milliLiter(s) IntraMuscular once  insulin glargine Injectable (LANTUS) 21 Unit(s) SubCutaneous at bedtime  insulin lispro (ADMELOG) corrective regimen sliding scale   SubCutaneous three times a day before meals  insulin lispro Injectable (ADMELOG) 7 Unit(s) SubCutaneous three times a day before meals  iron sucrose Injectable 100 milliGRAM(s) IV Push <User Schedule>  multivitamin 1 Tablet(s) Oral daily        VITALS:  T(F): 96.9 (11-11-21 @ 05:34), Max: 96.9 (11-10-21 @ 21:12)  HR: 86 (11-11-21 @ 05:34)  BP: 121/66 (11-11-21 @ 05:34)  RR: 18 (11-11-21 @ 05:34)  SpO2: 98% (11-11-21 @ 05:34)      11-09 @ 07:01  -  11-10 @ 07:00  --------------------------------------------------------  IN: 0 mL / OUT: 600 mL / NET: -600 mL    11-10 @ 07:01  -  11-11 @ 07:00  --------------------------------------------------------  IN: 0 mL / OUT: 400 mL / NET: -400 mL      Height (cm): 182.9 (11-10 @ 13:59)  Weight (kg): 106.9 (11-10 @ 13:59)  BMI (kg/m2): 32 (11-10 @ 13:59)  BSA (m2): 2.28 (11-10 @ 13:59)    PHYSICAL EXAM:  Constitutional: NAD  Neck: No JVD  Respiratory: CTAB,   Cardiovascular: S1, S2, RRR  Gastrointestinal: BS+, soft, NT/ND  Extremities: No cyanosis or clubbing. No peripheral edema  :  No callahan.   Skin: No rashes    LABS:            Urine Studies:      RADIOLOGY & ADDITIONAL STUDIES:

## 2021-11-11 NOTE — PROGRESS NOTE ADULT - SUBJECTIVE AND OBJECTIVE BOX
BECKYGILDARDO MELENDREZKIANA  56y  Male      Patient is a 56y old  Male who presents with a chief complaint of Inadequate social support (11 Nov 2021 08:48)      INTERVAL HPI/OVERNIGHT EVENTS: no acute events overnight. patient for HD today. no major complaints. no nursing concerns.       REVIEW OF SYSTEMS:  CONSTITUTIONAL: No fever, weight loss, or fatigue  RESPIRATORY: No cough, wheezing, chills or hemoptysis; No shortness of breath  CARDIOVASCULAR: No chest pain, palpitations, dizziness, or leg swelling  GASTROINTESTINAL: No abdominal or epigastric pain. No nausea, vomiting, or hematemesis; No diarrhea or constipation. No melena or hematochezia.  GENITOURINARY: No dysuria, frequency, hematuria, or incontinence  NEUROLOGICAL: No headaches, memory loss, loss of strength, numbness, or tremors  SKIN: No itching, burning, rashes, or lesions   MUSCULOSKELETAL: No joint pain or swelling; No muscle, back, or extremity pain  PSYCHIATRIC: No depression, anxiety, mood swings, or difficulty sleeping  All other review of systems negative    VITALS  T(C): 36.1 (11-11-21 @ 05:34), Max: 36.1 (11-10-21 @ 21:12)  HR: 82 (11-11-21 @ 09:05) (82 - 91)  BP: 152/77 (11-11-21 @ 09:05) (121/66 - 161/78)  RR: 18 (11-11-21 @ 09:05) (18 - 18)  SpO2: 98% (11-11-21 @ 05:34) (98% - 98%)  Wt(kg): --Vital Signs Last 24 Hrs  T(C): 36.1 (11 Nov 2021 05:34), Max: 36.1 (10 Nov 2021 21:12)  T(F): 96.9 (11 Nov 2021 05:34), Max: 96.9 (10 Nov 2021 21:12)  HR: 82 (11 Nov 2021 09:05) (82 - 91)  BP: 152/77 (11 Nov 2021 09:05) (121/66 - 161/78)  BP(mean): --  RR: 18 (11 Nov 2021 09:05) (18 - 18)  SpO2: 98% (11 Nov 2021 05:34) (98% - 98%)      11-10-21 @ 07:01  -  11-11-21 @ 07:00  --------------------------------------------------------  IN: 0 mL / OUT: 400 mL / NET: -400 mL      PHYSICAL EXAM:  GENERAL: elderly M, NAD, non toxic appearing  EYES: anicteric sclera, non injected conjunctiva, EOMI  ENT: hearing grossly intact, oropharynx clear, MMM  PSYCH: no agitation, baseline mentation  NERVOUS SYSTEM:  Alert & Oriented X3, Motor Strength 5/5 B/L upper and lower extremities; Sensory intact  PULMONARY: Clear to percussion bilaterally; No rales, rhonchi, wheezing, or rubs  CARDIOVASCULAR: Regular rate and rhythm; No murmurs, rubs, or gallops  GI: Soft, Nontender, Nondistended; Bowel sounds present  EXTREMITIES:  2+ Peripheral Pulses, No clubbing, cyanosis, or edema  LYMPH: No lymphadenopathy noted  SKIN: No rashes or lesions    Consultant(s) Notes Reviewed:  [x ] YES  [ ] NO    Discussed with Consultants/Other Providers [ x] YES     LABS  - no labs 11/11    POCT Blood Glucose.: 196 mg/dL (11 Nov 2021 07:12)      RADIOLOGY & ADDITIONAL TESTS:  - no images 11/11  Imaging Personally Reviewed:  [ ] YES  [ ] NO    HEALTH ISSUES - PROBLEM Dx:      MEDICATIONS  (STANDING):  apixaban 5 milliGRAM(s) Oral two times a day  atorvastatin 40 milliGRAM(s) Oral at bedtime  BACItracin   Ointment 1 Application(s) Topical two times a day  calcitriol   Capsule 0.5 MICROGram(s) Oral <User Schedule>  calcium acetate 667 milliGRAM(s) Oral four times a day with meals  clopidogrel Tablet 75 milliGRAM(s) Oral daily  cyanocobalamin 1000 MICROGram(s) Oral daily  darbepoetin Injectable Syringe 40 MICROGram(s) SubCutaneous every week  dextrose 40% Gel 15 Gram(s) Oral once  dextrose 5%. 1000 milliLiter(s) (50 mL/Hr) IV Continuous <Continuous>  dextrose 5%. 1000 milliLiter(s) (100 mL/Hr) IV Continuous <Continuous>  dextrose 50% Injectable 25 Gram(s) IV Push once  dextrose 50% Injectable 12.5 Gram(s) IV Push once  dextrose 50% Injectable 25 Gram(s) IV Push once  gabapentin 300 milliGRAM(s) Oral every 12 hours  glucagon  Injectable 1 milliGRAM(s) IntraMuscular once  influenza   Vaccine 0.5 milliLiter(s) IntraMuscular once  insulin glargine Injectable (LANTUS) 21 Unit(s) SubCutaneous at bedtime  insulin lispro (ADMELOG) corrective regimen sliding scale   SubCutaneous three times a day before meals  insulin lispro Injectable (ADMELOG) 7 Unit(s) SubCutaneous three times a day before meals  iron sucrose Injectable 100 milliGRAM(s) IV Push <User Schedule>  multivitamin 1 Tablet(s) Oral daily    MEDICATIONS  (PRN):  simethicone 80 milliGRAM(s) Chew four times a day PRN Dyspepsia

## 2021-11-11 NOTE — PROGRESS NOTE ADULT - ASSESSMENT
55 yo M with PMH of DM, CAD s/p Stents, ESRD on HD  at [facility] presented from [facility] for eval of agitation. As per NH pt became agitated and refused medications.  Patient stated that he does not want to go back to [facility] and he wants to do HHD     # ESRD on HD  # MBD  # Normocytic anemia   # HTN    - HD today  3hrs, 2L UF   - on venofer; check iron stores , h/h at goal repeat CBC  - last ph level noted, cont pholso 1 tab qac.  Check iPTH  - BP controlled  - pending placement

## 2021-11-11 NOTE — PROGRESS NOTE ADULT - ASSESSMENT
55 yo M with PMH of DM, CAD s/p Stents, ESRD on HD sat/tu/th presented from [facility] for eval of agitation. As per NH pt became agitated and refused medications and dialysis yesterday. Per patient he had inadequate care at NH and wanted to go home with home care.    # ESRD on HD (TTS).   - calcium acetate 667 milliGRAM(s) Oral four times a day with meals  - iron sucrose Injectable 100 milliGRAM(s) IV Push <User Schedule>  - Renal consult for in-pt HD      - HD today, 3hrs, 2L UF      - on venofer; check iron stores , h/h at goal       - last ph level noted, started pholso 1 tab qac.  Check iPTH      - BP controlled      # History of b12 def  - cyanocobalamin 1000 MICROGram(s) Oral daily    # DM neuropathy  - gabapentin 300 milliGRAM(s) Oral every 12 hours      # Diabetes mellitus with hyperglycemia  - A1c: 5.7  - insulin glargine Injectable (LANTUS) 21 Unit(s) SubCutaneous at bedtime  - insulin lispro (ADMELOG) corrective regimen sliding scale   SubCutaneous three times a day before meals  - insulin lispro Injectable (ADMELOG) 7 Unit(s) SubCutaneous three times a day before meals    # Normocytic anemia, at goal for ESRD pt.  -  Pt denies any bleeding symptoms. Replace as necessary.   - venofer as above  - darbepoetin Injectable Syringe 40 MICROGram(s) SubCutaneous every week    # HAGMA (High AG Metabolic Acidosis)  - from uremic acidosis. On HD.    # Hx of CAD s/p Stents.  - atorvastatin 40 milliGRAM(s) Oral at bedtime  - clopidogrel Tablet 75 milliGRAM(s) Oral daily    # H/O DVT:   - per patient he was on coumadin for 15 years, recently switched to Eliquis. unsure why he is on it indefinitely(provoked vs unprovoked PE)- poor historian.  - apixaban 5 milliGRAM(s) Oral two times a day  - will need to follow up with PCP once discharged and continue conversation about ongoing need       #Progress Note Handoff:  Pending (specify): dc to different NH , hep panel resulted and negative   Family discussion: d/w pt   Disposition: agreeable to STR [facility]. Hep Panel and CXR sent to facility

## 2021-11-12 LAB
ALBUMIN SERPL ELPH-MCNC: 3 G/DL — LOW (ref 3.5–5.2)
ALP SERPL-CCNC: 140 U/L — HIGH (ref 30–115)
ALT FLD-CCNC: 34 U/L — SIGNIFICANT CHANGE UP (ref 0–41)
ANION GAP SERPL CALC-SCNC: 16 MMOL/L — HIGH (ref 7–14)
AST SERPL-CCNC: 25 U/L — SIGNIFICANT CHANGE UP (ref 0–41)
BILIRUB SERPL-MCNC: <0.2 MG/DL — SIGNIFICANT CHANGE UP (ref 0.2–1.2)
BUN SERPL-MCNC: 51 MG/DL — HIGH (ref 10–20)
CALCIUM SERPL-MCNC: 7.6 MG/DL — LOW (ref 8.5–10.1)
CHLORIDE SERPL-SCNC: 105 MMOL/L — SIGNIFICANT CHANGE UP (ref 98–110)
CO2 SERPL-SCNC: 19 MMOL/L — SIGNIFICANT CHANGE UP (ref 17–32)
CREAT SERPL-MCNC: 5.2 MG/DL — CRITICAL HIGH (ref 0.7–1.5)
GLUCOSE BLDC GLUCOMTR-MCNC: 140 MG/DL — HIGH (ref 70–99)
GLUCOSE BLDC GLUCOMTR-MCNC: 145 MG/DL — HIGH (ref 70–99)
GLUCOSE BLDC GLUCOMTR-MCNC: 171 MG/DL — HIGH (ref 70–99)
GLUCOSE BLDC GLUCOMTR-MCNC: 190 MG/DL — HIGH (ref 70–99)
GLUCOSE BLDC GLUCOMTR-MCNC: 247 MG/DL — HIGH (ref 70–99)
GLUCOSE SERPL-MCNC: 166 MG/DL — HIGH (ref 70–99)
HCT VFR BLD CALC: 29.7 % — LOW (ref 42–52)
HGB BLD-MCNC: 9.5 G/DL — LOW (ref 14–18)
MCHC RBC-ENTMCNC: 27.8 PG — SIGNIFICANT CHANGE UP (ref 27–31)
MCHC RBC-ENTMCNC: 32 G/DL — SIGNIFICANT CHANGE UP (ref 32–37)
MCV RBC AUTO: 86.8 FL — SIGNIFICANT CHANGE UP (ref 80–94)
NRBC # BLD: 0 /100 WBCS — SIGNIFICANT CHANGE UP (ref 0–0)
PLATELET # BLD AUTO: 163 K/UL — SIGNIFICANT CHANGE UP (ref 130–400)
POTASSIUM SERPL-MCNC: 4.6 MMOL/L — SIGNIFICANT CHANGE UP (ref 3.5–5)
POTASSIUM SERPL-SCNC: 4.6 MMOL/L — SIGNIFICANT CHANGE UP (ref 3.5–5)
PROT SERPL-MCNC: 5.7 G/DL — LOW (ref 6–8)
RBC # BLD: 3.42 M/UL — LOW (ref 4.7–6.1)
RBC # FLD: 13.1 % — SIGNIFICANT CHANGE UP (ref 11.5–14.5)
SODIUM SERPL-SCNC: 140 MMOL/L — SIGNIFICANT CHANGE UP (ref 135–146)
WBC # BLD: 6.38 K/UL — SIGNIFICANT CHANGE UP (ref 4.8–10.8)
WBC # FLD AUTO: 6.38 K/UL — SIGNIFICANT CHANGE UP (ref 4.8–10.8)

## 2021-11-12 PROCEDURE — 99232 SBSQ HOSP IP/OBS MODERATE 35: CPT

## 2021-11-12 RX ADMIN — APIXABAN 5 MILLIGRAM(S): 2.5 TABLET, FILM COATED ORAL at 18:01

## 2021-11-12 RX ADMIN — INSULIN GLARGINE 21 UNIT(S): 100 INJECTION, SOLUTION SUBCUTANEOUS at 21:47

## 2021-11-12 RX ADMIN — Medication 667 MILLIGRAM(S): at 12:46

## 2021-11-12 RX ADMIN — Medication 7 UNIT(S): at 07:50

## 2021-11-12 RX ADMIN — CLOPIDOGREL BISULFATE 75 MILLIGRAM(S): 75 TABLET, FILM COATED ORAL at 12:46

## 2021-11-12 RX ADMIN — Medication 7 UNIT(S): at 11:42

## 2021-11-12 RX ADMIN — Medication 1 APPLICATION(S): at 18:02

## 2021-11-12 RX ADMIN — Medication 2: at 18:00

## 2021-11-12 RX ADMIN — APIXABAN 5 MILLIGRAM(S): 2.5 TABLET, FILM COATED ORAL at 05:20

## 2021-11-12 RX ADMIN — ATORVASTATIN CALCIUM 40 MILLIGRAM(S): 80 TABLET, FILM COATED ORAL at 21:47

## 2021-11-12 RX ADMIN — Medication 667 MILLIGRAM(S): at 08:27

## 2021-11-12 RX ADMIN — Medication 1: at 11:42

## 2021-11-12 RX ADMIN — Medication 667 MILLIGRAM(S): at 21:47

## 2021-11-12 RX ADMIN — Medication 7 UNIT(S): at 18:00

## 2021-11-12 RX ADMIN — GABAPENTIN 300 MILLIGRAM(S): 400 CAPSULE ORAL at 05:20

## 2021-11-12 RX ADMIN — Medication 1 APPLICATION(S): at 05:20

## 2021-11-12 RX ADMIN — GABAPENTIN 300 MILLIGRAM(S): 400 CAPSULE ORAL at 18:01

## 2021-11-12 RX ADMIN — Medication 1 TABLET(S): at 12:46

## 2021-11-12 RX ADMIN — Medication 667 MILLIGRAM(S): at 18:01

## 2021-11-12 RX ADMIN — PREGABALIN 1000 MICROGRAM(S): 225 CAPSULE ORAL at 12:46

## 2021-11-12 NOTE — PROGRESS NOTE ADULT - ASSESSMENT
57 yo M with PMH of DM, CAD s/p Stents, ESRD on HD sat/tu/th presented from [facility] for eval of agitation. As per NH pt became agitated and refused medications and dialysis yesterday. Per patient he had inadequate care at NH and wanted to go home with home care.    # ESRD on HD (TTS).   - calcium acetate 667 milliGRAM(s) Oral four times a day with meals  - iron sucrose Injectable 100 milliGRAM(s) IV Push <User Schedule>  - Renal consult for in-pt HD      - HD (last on 11/11); 3hrs, 2L UF      - on venofer; check iron stores , h/h at goal       - last ph level noted, started pholso 1 tab qac.  Check iPTH      - BP controlled      # History of b12 def  - cyanocobalamin 1000 MICROGram(s) Oral daily    # DM neuropathy  - gabapentin 300 milliGRAM(s) Oral every 12 hours      # Diabetes mellitus with hyperglycemia  - A1c: 5.7  - insulin glargine Injectable (LANTUS) 21 Unit(s) SubCutaneous at bedtime  - insulin lispro (ADMELOG) corrective regimen sliding scale   SubCutaneous three times a day before meals  - insulin lispro Injectable (ADMELOG) 7 Unit(s) SubCutaneous three times a day before meals    # Normocytic anemia, at goal for ESRD pt.  -  Pt denies any bleeding symptoms. Replace as necessary.   - venofer as above  - darbepoetin Injectable Syringe 40 MICROGram(s) SubCutaneous every week    # HAGMA (High AG Metabolic Acidosis)  - from uremic acidosis. On HD.    # Hx of CAD s/p Stents.  - atorvastatin 40 milliGRAM(s) Oral at bedtime  - clopidogrel Tablet 75 milliGRAM(s) Oral daily    # H/O DVT:   - per patient he was on coumadin for 15 years, recently switched to Eliquis. unsure why he is on it indefinitely(provoked vs unprovoked PE)- poor historian.  - apixaban 5 milliGRAM(s) Oral two times a day  - will need to follow up with PCP once discharged and continue conversation about ongoing need       #Progress Note Handoff:  Pending (specify): dc to different NH , hep panel resulted and negative   Family discussion: d/w pt   Disposition: agreeable to [facility]. Hep Panel and CXR sent to facility

## 2021-11-12 NOTE — PROGRESS NOTE ADULT - SUBJECTIVE AND OBJECTIVE BOX
Nephrology progress note    Patient is seen and examined, events over the last 24 h noted .  Denies complaints  Allergies:  No Known Allergies    Hospital Medications:   MEDICATIONS  (STANDING):  apixaban 5 milliGRAM(s) Oral two times a day  atorvastatin 40 milliGRAM(s) Oral at bedtime  BACItracin   Ointment 1 Application(s) Topical two times a day  calcitriol   Capsule 0.5 MICROGram(s) Oral <User Schedule>  calcium acetate 667 milliGRAM(s) Oral four times a day with meals  clopidogrel Tablet 75 milliGRAM(s) Oral daily  cyanocobalamin 1000 MICROGram(s) Oral daily  darbepoetin Injectable Syringe 40 MICROGram(s) SubCutaneous every week  dextrose 40% Gel 15 Gram(s) Oral once  dextrose 5%. 1000 milliLiter(s) (50 mL/Hr) IV Continuous <Continuous>  dextrose 5%. 1000 milliLiter(s) (100 mL/Hr) IV Continuous <Continuous>  dextrose 50% Injectable 25 Gram(s) IV Push once  dextrose 50% Injectable 12.5 Gram(s) IV Push once  dextrose 50% Injectable 25 Gram(s) IV Push once  gabapentin 300 milliGRAM(s) Oral every 12 hours  glucagon  Injectable 1 milliGRAM(s) IntraMuscular once  influenza   Vaccine 0.5 milliLiter(s) IntraMuscular once  insulin glargine Injectable (LANTUS) 21 Unit(s) SubCutaneous at bedtime  insulin lispro (ADMELOG) corrective regimen sliding scale   SubCutaneous three times a day before meals  insulin lispro Injectable (ADMELOG) 7 Unit(s) SubCutaneous three times a day before meals  iron sucrose Injectable 100 milliGRAM(s) IV Push <User Schedule>  multivitamin 1 Tablet(s) Oral daily        VITALS:  T(F): 97.7 (11-12-21 @ 12:24), Max: 97.7 (11-12-21 @ 12:24)  HR: 86 (11-12-21 @ 12:24)  BP: 137/71 (11-12-21 @ 12:24)  RR: 18 (11-12-21 @ 12:24)  SpO2: --  Wt(kg): --    11-10 @ 07:01  -  11-11 @ 07:00  --------------------------------------------------------  IN: 0 mL / OUT: 400 mL / NET: -400 mL    11-11 @ 07:01  -  11-12 @ 07:00  --------------------------------------------------------  IN: 0 mL / OUT: 2000 mL / NET: -2000 mL          PHYSICAL EXAM:  Constitutional: NAD  HEENT: anicteric sclera, oropharynx clear, MMM  Neck: No JVD  Respiratory: CTAB, no wheezes, rales or rhonchi  Cardiovascular: S1, S2, RRR  Gastrointestinal: BS+, soft, NT/ND  Extremities: No peripheral edema  Neurological: A/O x 3  : No CVA tenderness. No callahan.   Skin: No rashes  Vascular Access: tesio    LABS:  11-12    140  |  105  |  51<H>  ----------------------------<  166<H>  4.6   |  19  |  5.2<HH>    Ca    7.6<L>      12 Nov 2021 07:11    TPro  5.7<L>  /  Alb  3.0<L>  /  TBili  <0.2  /  DBili      /  AST  25  /  ALT  34  /  AlkPhos  140<H>  11-12                          9.5    6.38  )-----------( 163      ( 12 Nov 2021 07:11 )             29.7       Urine Studies:      RADIOLOGY & ADDITIONAL STUDIES:

## 2021-11-12 NOTE — PROGRESS NOTE ADULT - SUBJECTIVE AND OBJECTIVE BOX
ALEXANDRO MONROE  56y  Male      Patient is a 56y old  Male who presents with a chief complaint of Inadequate social support (11 Nov 2021 11:28)      INTERVAL HPI/OVERNIGHT EVENTS: no acute events overnight. no major complaints. s/p HD session yesterday- uneventful. no nursing concerns.       REVIEW OF SYSTEMS:  CONSTITUTIONAL: No fever, weight loss, or fatigue  RESPIRATORY: No cough, wheezing, chills or hemoptysis; No shortness of breath  CARDIOVASCULAR: No chest pain, palpitations, dizziness, or leg swelling  GASTROINTESTINAL: No abdominal or epigastric pain. No nausea, vomiting, or hematemesis; No diarrhea or constipation. No melena or hematochezia.  GENITOURINARY: No dysuria, frequency, hematuria, or incontinence  NEUROLOGICAL: No headaches, memory loss, loss of strength, numbness, or tremors  SKIN: No itching, burning, rashes, or lesions   MUSCULOSKELETAL: No joint pain or swelling; No muscle, back, or extremity pain  PSYCHIATRIC: No depression, anxiety, mood swings, or difficulty sleeping  All other review of systems negative    VITALS  T(C): 36.5 (11-12-21 @ 12:24), Max: 36.5 (11-12-21 @ 12:24)  HR: 86 (11-12-21 @ 12:24) (84 - 91)  BP: 137/71 (11-12-21 @ 12:24) (131/77 - 170/75)  RR: 18 (11-12-21 @ 12:24) (18 - 18)  SpO2: --  Wt(kg): --Vital Signs Last 24 Hrs  T(C): 36.5 (12 Nov 2021 12:24), Max: 36.5 (12 Nov 2021 12:24)  T(F): 97.7 (12 Nov 2021 12:24), Max: 97.7 (12 Nov 2021 12:24)  HR: 86 (12 Nov 2021 12:24) (84 - 91)  BP: 137/71 (12 Nov 2021 12:24) (131/77 - 170/75)  BP(mean): --  RR: 18 (12 Nov 2021 12:24) (18 - 18)  SpO2: --      11-11-21 @ 07:01  -  11-12-21 @ 07:00  --------------------------------------------------------  IN: 0 mL / OUT: 2000 mL / NET: -2000 mL        PHYSICAL EXAM:  GENERAL: elderly M, NAD, non toxic appearing  EYES: anicteric sclera, non injected conjunctiva, EOMI  ENT: hearing grossly intact, oropharynx clear, MMM  PSYCH: no agitation, baseline mentation  NERVOUS SYSTEM:  Alert & Oriented X3, Motor Strength 5/5 B/L upper and lower extremities; Sensory intact  PULMONARY: Clear to percussion bilaterally; No rales, rhonchi, wheezing, or rubs  CARDIOVASCULAR: Regular rate and rhythm; No murmurs, rubs, or gallops  GI: Soft, Nontender, Nondistended; Bowel sounds present  EXTREMITIES:  2+ Peripheral Pulses, No clubbing, cyanosis, or edema  LYMPH: No lymphadenopathy noted  SKIN: No rashes or lesions    Consultant(s) Notes Reviewed:  [x ] YES  [ ] NO    Discussed with Consultants/Other Providers [ x] YES     LABS                          9.5    6.38  )-----------( 163      ( 12 Nov 2021 07:11 )             29.7     11-12    140  |  105  |  51<H>  ----------------------------<  166<H>  4.6   |  19  |  5.2<HH>    Ca    7.6<L>      12 Nov 2021 07:11    TPro  5.7<L>  /  Alb  3.0<L>  /  TBili  <0.2  /  DBili  x   /  AST  25  /  ALT  34  /  AlkPhos  140<H>  11-12    POCT Blood Glucose.: 171 mg/dL (12 Nov 2021 11:38)    RADIOLOGY & ADDITIONAL TESTS:  - no images 11/12      HEALTH ISSUES - PROBLEM Dx:      MEDICATIONS  (STANDING):  apixaban 5 milliGRAM(s) Oral two times a day  atorvastatin 40 milliGRAM(s) Oral at bedtime  BACItracin   Ointment 1 Application(s) Topical two times a day  calcitriol   Capsule 0.5 MICROGram(s) Oral <User Schedule>  calcium acetate 667 milliGRAM(s) Oral four times a day with meals  clopidogrel Tablet 75 milliGRAM(s) Oral daily  cyanocobalamin 1000 MICROGram(s) Oral daily  darbepoetin Injectable Syringe 40 MICROGram(s) SubCutaneous every week  dextrose 40% Gel 15 Gram(s) Oral once  dextrose 5%. 1000 milliLiter(s) (50 mL/Hr) IV Continuous <Continuous>  dextrose 5%. 1000 milliLiter(s) (100 mL/Hr) IV Continuous <Continuous>  dextrose 50% Injectable 25 Gram(s) IV Push once  dextrose 50% Injectable 12.5 Gram(s) IV Push once  dextrose 50% Injectable 25 Gram(s) IV Push once  gabapentin 300 milliGRAM(s) Oral every 12 hours  glucagon  Injectable 1 milliGRAM(s) IntraMuscular once  influenza   Vaccine 0.5 milliLiter(s) IntraMuscular once  insulin glargine Injectable (LANTUS) 21 Unit(s) SubCutaneous at bedtime  insulin lispro (ADMELOG) corrective regimen sliding scale   SubCutaneous three times a day before meals  insulin lispro Injectable (ADMELOG) 7 Unit(s) SubCutaneous three times a day before meals  iron sucrose Injectable 100 milliGRAM(s) IV Push <User Schedule>  multivitamin 1 Tablet(s) Oral daily    MEDICATIONS  (PRN):  simethicone 80 milliGRAM(s) Chew four times a day PRN Dyspepsia

## 2021-11-12 NOTE — PROGRESS NOTE ADULT - ASSESSMENT
55 yo M with PMH of DM, CAD s/p Stents, ESRD on HD  at [facility] presented from [facility] for eval of agitation. As per NH pt became agitated and refused medications.  Patient stated that he does not want to go back to [facility] and he wants to do HHD     # ESRD on HD  # MBD  # Normocytic anemia   # HTN    - HD tomorrow 3hrs, 2L UF   - on venofer; check iron stores , h/h at goal repeat CBC  - last ph level noted, cont pholso 1 tab qac.  Check iPTH  -decrease Gabapentin to 300 mg qd  - BP controlled  - pending placement

## 2021-11-13 LAB
ANION GAP SERPL CALC-SCNC: 17 MMOL/L — HIGH (ref 7–14)
BUN SERPL-MCNC: 39 MG/DL — HIGH (ref 10–20)
CALCIUM SERPL-MCNC: 7.8 MG/DL — LOW (ref 8.5–10.1)
CHLORIDE SERPL-SCNC: 102 MMOL/L — SIGNIFICANT CHANGE UP (ref 98–110)
CO2 SERPL-SCNC: 21 MMOL/L — SIGNIFICANT CHANGE UP (ref 17–32)
CREAT SERPL-MCNC: 4.2 MG/DL — CRITICAL HIGH (ref 0.7–1.5)
GLUCOSE BLDC GLUCOMTR-MCNC: 183 MG/DL — HIGH (ref 70–99)
GLUCOSE BLDC GLUCOMTR-MCNC: 200 MG/DL — HIGH (ref 70–99)
GLUCOSE BLDC GLUCOMTR-MCNC: 222 MG/DL — HIGH (ref 70–99)
GLUCOSE BLDC GLUCOMTR-MCNC: 252 MG/DL — HIGH (ref 70–99)
GLUCOSE BLDC GLUCOMTR-MCNC: 255 MG/DL — HIGH (ref 70–99)
GLUCOSE SERPL-MCNC: 243 MG/DL — HIGH (ref 70–99)
POTASSIUM SERPL-MCNC: 4.4 MMOL/L — SIGNIFICANT CHANGE UP (ref 3.5–5)
POTASSIUM SERPL-SCNC: 4.4 MMOL/L — SIGNIFICANT CHANGE UP (ref 3.5–5)
SODIUM SERPL-SCNC: 140 MMOL/L — SIGNIFICANT CHANGE UP (ref 135–146)

## 2021-11-13 PROCEDURE — 99232 SBSQ HOSP IP/OBS MODERATE 35: CPT

## 2021-11-13 RX ADMIN — INSULIN GLARGINE 21 UNIT(S): 100 INJECTION, SOLUTION SUBCUTANEOUS at 22:25

## 2021-11-13 RX ADMIN — Medication 667 MILLIGRAM(S): at 08:44

## 2021-11-13 RX ADMIN — Medication 1 TABLET(S): at 14:55

## 2021-11-13 RX ADMIN — Medication 1 APPLICATION(S): at 17:33

## 2021-11-13 RX ADMIN — Medication 667 MILLIGRAM(S): at 22:18

## 2021-11-13 RX ADMIN — Medication 667 MILLIGRAM(S): at 17:32

## 2021-11-13 RX ADMIN — GABAPENTIN 300 MILLIGRAM(S): 400 CAPSULE ORAL at 05:35

## 2021-11-13 RX ADMIN — APIXABAN 5 MILLIGRAM(S): 2.5 TABLET, FILM COATED ORAL at 17:32

## 2021-11-13 RX ADMIN — Medication 7 UNIT(S): at 08:47

## 2021-11-13 RX ADMIN — CLOPIDOGREL BISULFATE 75 MILLIGRAM(S): 75 TABLET, FILM COATED ORAL at 14:56

## 2021-11-13 RX ADMIN — PREGABALIN 1000 MICROGRAM(S): 225 CAPSULE ORAL at 14:56

## 2021-11-13 RX ADMIN — Medication 1: at 08:46

## 2021-11-13 RX ADMIN — Medication 1: at 17:29

## 2021-11-13 RX ADMIN — IRON SUCROSE 100 MILLIGRAM(S): 20 INJECTION, SOLUTION INTRAVENOUS at 10:19

## 2021-11-13 RX ADMIN — ATORVASTATIN CALCIUM 40 MILLIGRAM(S): 80 TABLET, FILM COATED ORAL at 22:18

## 2021-11-13 RX ADMIN — APIXABAN 5 MILLIGRAM(S): 2.5 TABLET, FILM COATED ORAL at 05:33

## 2021-11-13 RX ADMIN — GABAPENTIN 300 MILLIGRAM(S): 400 CAPSULE ORAL at 18:22

## 2021-11-13 RX ADMIN — Medication 1 APPLICATION(S): at 05:34

## 2021-11-13 RX ADMIN — CALCITRIOL 0.5 MICROGRAM(S): 0.5 CAPSULE ORAL at 14:55

## 2021-11-13 NOTE — PROGRESS NOTE ADULT - ASSESSMENT
57 yo M with PMH of DM, CAD s/p Stents, ESRD on HD sat/tu/th presented from [facility] for eval of agitation. As per NH pt became agitated and refused medications and dialysis yesterday. Per patient he had inadequate care at NH and wanted to go home with home care.    # ESRD on HD (TTS).   - calcium acetate 667 milliGRAM(s) Oral four times a day with meals  - iron sucrose Injectable 100 milliGRAM(s) IV Push <User Schedule>  - Renal consult for in-pt HD      - HD (last on 11/11); 3hrs, 2L UF; HD today 11/13      - on venofer; check iron stores , h/h at goal       - last ph level noted, started pholso 1 tab qac.  Check iPTH      - BP controlled      # History of b12 def  - cyanocobalamin 1000 MICROGram(s) Oral daily    # DM neuropathy  - gabapentin 300 milliGRAM(s) Oral every 12 hours      # Diabetes mellitus with hyperglycemia  - A1c: 5.7  - insulin glargine Injectable (LANTUS) 21 Unit(s) SubCutaneous at bedtime  - insulin lispro (ADMELOG) corrective regimen sliding scale   SubCutaneous three times a day before meals  - insulin lispro Injectable (ADMELOG) 7 Unit(s) SubCutaneous three times a day before meals    # Normocytic anemia, at goal for ESRD pt.  -  Pt denies any bleeding symptoms. Replace as necessary.   - venofer as above  - darbepoetin Injectable Syringe 40 MICROGram(s) SubCutaneous every week    # HAGMA (High AG Metabolic Acidosis)  - from uremic acidosis. On HD.    # Hx of CAD s/p Stents.  - atorvastatin 40 milliGRAM(s) Oral at bedtime  - clopidogrel Tablet 75 milliGRAM(s) Oral daily    # H/O DVT:   - per patient he was on coumadin for 15 years, recently switched to Eliquis. unsure why he is on it indefinitely(provoked vs unprovoked PE)- poor historian.  - apixaban 5 milliGRAM(s) Oral two times a day  - will need to follow up with PCP once discharged and continue conversation about ongoing need       #Progress Note Handoff:  Pending (specify): HD today. Auth and placement at new STR facility in .  Family discussion: d/w pt   Disposition: CM provided patient with the following STR that making a bed offer to patient.  [facility] (formerly [phone]. Patient states that he will research it and call facility. Patient gave CM the ok for facility to call him.

## 2021-11-13 NOTE — PROGRESS NOTE ADULT - ASSESSMENT
57 yo M with PMH of DM, CAD s/p Stents, ESRD on HD  at [facility] presented from [facility] for eval of agitation. As per NH pt became agitated and refused medications.  Patient stated that he does not want to go back to [facility] and he wants to do HHD   # ESRD on HD  # MBD  # Normocytic anemia   # HTN  - HD today  3hrs, 2L UF   - on venofer; check iron stores , h/h noted , on PARISA   - last ph level noted, cont pholso 1 tab qac.  Check iPTH  -decrease Gabapentin to 300 mg qd  - BP controlled  - pending placement  - will follow

## 2021-11-13 NOTE — PROGRESS NOTE ADULT - SUBJECTIVE AND OBJECTIVE BOX
seen and examined  no distress   lying comfortable         PAST HISTORY  --------------------------------------------------------------------------------  No significant changes to PMH, PSH, FHx, SHx, unless otherwise noted    ALLERGIES & MEDICATIONS  --------------------------------------------------------------------------------  Allergies    No Known Allergies    Intolerances      Standing Inpatient Medications  apixaban 5 milliGRAM(s) Oral two times a day  atorvastatin 40 milliGRAM(s) Oral at bedtime  BACItracin   Ointment 1 Application(s) Topical two times a day  calcitriol   Capsule 0.5 MICROGram(s) Oral <User Schedule>  calcium acetate 667 milliGRAM(s) Oral four times a day with meals  clopidogrel Tablet 75 milliGRAM(s) Oral daily  cyanocobalamin 1000 MICROGram(s) Oral daily  darbepoetin Injectable Syringe 40 MICROGram(s) SubCutaneous every week  dextrose 40% Gel 15 Gram(s) Oral once  dextrose 5%. 1000 milliLiter(s) IV Continuous <Continuous>  dextrose 5%. 1000 milliLiter(s) IV Continuous <Continuous>  dextrose 50% Injectable 25 Gram(s) IV Push once  dextrose 50% Injectable 12.5 Gram(s) IV Push once  dextrose 50% Injectable 25 Gram(s) IV Push once  gabapentin 300 milliGRAM(s) Oral every 12 hours  glucagon  Injectable 1 milliGRAM(s) IntraMuscular once  influenza   Vaccine 0.5 milliLiter(s) IntraMuscular once  insulin glargine Injectable (LANTUS) 21 Unit(s) SubCutaneous at bedtime  insulin lispro (ADMELOG) corrective regimen sliding scale   SubCutaneous three times a day before meals  insulin lispro Injectable (ADMELOG) 7 Unit(s) SubCutaneous three times a day before meals  iron sucrose Injectable 100 milliGRAM(s) IV Push <User Schedule>  multivitamin 1 Tablet(s) Oral daily          VITALS/PHYSICAL EXAM  --------------------------------------------------------------------------------  T(C): 36.2 (11-13-21 @ 05:00), Max: 36.5 (11-12-21 @ 12:24)  HR: 96 (11-13-21 @ 05:00) (86 - 97)  BP: 156/74 (11-13-21 @ 05:00) (137/71 - 156/74)  RR: 18 (11-13-21 @ 05:00) (18 - 18)  SpO2: --  Wt(kg): --    Weight (kg): 108.5 (11-11-21 @ 08:48)      11-11-21 @ 07:01  -  11-12-21 @ 07:00  --------------------------------------------------------  IN: 0 mL / OUT: 2000 mL / NET: -2000 mL      Physical Exam:  	Gen: NAD,  	Pulm: CTA B/L  	CV:S1S2; no rub  	Abd:  soft, nontender/nondistended  	Vascular access: tesio     LABS/STUDIES  --------------------------------------------------------------------------------              9.5    6.38  >-----------<  163      [11-12-21 @ 07:11]              29.7     140  |  105  |  51  ----------------------------<  166      [11-12-21 @ 07:11]  4.6   |  19  |  5.2        Ca     7.6     [11-12-21 @ 07:11]    TPro  5.7  /  Alb  3.0  /  TBili  <0.2  /  DBili  x   /  AST  25  /  ALT  34  /  AlkPhos  140  [11-12-21 @ 07:11]          Creatinine Trend:  SCr 5.2 [11-12 @ 07:11]  SCr 5.3 [11-09 @ 04:30]  SCr 7.5 [11-08 @ 08:00]  SCr 6.8 [11-07 @ 04:30]  SCr 6.2 [11-06 @ 16:00]          HBsAg Nonreact      [11-08-21 @ 15:44]  HCV 0.12, Nonreact      [11-08-21 @ 15:44]

## 2021-11-13 NOTE — PROGRESS NOTE ADULT - SUBJECTIVE AND OBJECTIVE BOX
ALEXANDRO MONROE  56y  Male      Patient is a 56y old  Male who presents with a chief complaint of Inadequate social support (13 Nov 2021 06:58)      INTERVAL HPI/OVERNIGHT EVENTS: no acute events overnight. no major complaints. no nursing concerns.       REVIEW OF SYSTEMS:  CONSTITUTIONAL: No fever, weight loss, or fatigue  RESPIRATORY: No cough, wheezing, chills or hemoptysis; No shortness of breath  CARDIOVASCULAR: No chest pain, palpitations, dizziness, or leg swelling  GASTROINTESTINAL: No abdominal or epigastric pain. No nausea, vomiting, or hematemesis; No diarrhea or constipation. No melena or hematochezia.  GENITOURINARY: No dysuria, frequency, hematuria, or incontinence  NEUROLOGICAL: No headaches, memory loss, loss of strength, numbness, or tremors  SKIN: No itching, burning, rashes, or lesions   MUSCULOSKELETAL: No joint pain or swelling; No muscle, back, or extremity pain  PSYCHIATRIC: No depression, anxiety, mood swings, or difficulty sleeping  All other review of systems negative    VITALS  T(C): 36.2 (11-13-21 @ 05:00), Max: 36.2 (11-13-21 @ 05:00)  HR: 76 (11-13-21 @ 12:00) (76 - 97)  BP: 123/57 (11-13-21 @ 12:00) (123/57 - 162/79)  RR: 18 (11-13-21 @ 12:00) (18 - 18)  SpO2: --  Wt(kg): --Vital Signs Last 24 Hrs  T(C): 36.2 (13 Nov 2021 05:00), Max: 36.2 (13 Nov 2021 05:00)  T(F): 97.2 (13 Nov 2021 05:00), Max: 97.2 (13 Nov 2021 05:00)  HR: 76 (13 Nov 2021 12:00) (76 - 97)  BP: 123/57 (13 Nov 2021 12:00) (123/57 - 162/79)  BP(mean): --  RR: 18 (13 Nov 2021 12:00) (18 - 18)  SpO2: --      11-13-21 @ 07:01  -  11-13-21 @ 15:08  --------------------------------------------------------  IN: 0 mL / OUT: 3000 mL / NET: -3000 mL        PHYSICAL EXAM:  GENERAL: elderly M, NAD, non toxic appearing  EYES: anicteric sclera, non injected conjunctiva, EOMI  ENT: hearing grossly intact, oropharynx clear, MMM  PSYCH: no agitation, baseline mentation  NERVOUS SYSTEM:  Alert & Oriented X3, CN 2-12 grossly intact  PULMONARY: Clear to percussion bilaterally; No rales, rhonchi, wheezing, or rubs  CARDIOVASCULAR: Regular rate and rhythm; No murmurs, rubs, or gallops  GI: Soft, Nontender, Nondistended; Bowel sounds present  EXTREMITIES:  2+ Peripheral Pulses, No clubbing, cyanosis, or edema  LYMPH: No lymphadenopathy noted  SKIN: No rashes or lesions  Consultant(s) Notes Reviewed:  [x ] YES  [ ] NO    Discussed with Consultants/Other Providers [ x] YES     LABS                          9.5    6.38  )-----------( 163      ( 12 Nov 2021 07:11 )             29.7     11-12    140  |  105  |  51<H>  ----------------------------<  166<H>  4.6   |  19  |  5.2<HH>    Ca    7.6<L>      12 Nov 2021 07:11    TPro  5.7<L>  /  Alb  3.0<L>  /  TBili  <0.2  /  DBili  x   /  AST  25  /  ALT  34  /  AlkPhos  140<H>  11-12    POCT Blood Glucose.: 183 mg/dL (13 Nov 2021 08:40)    RADIOLOGY & ADDITIONAL TESTS:  - no images 11/13  Imaging Personally Reviewed:  [ ] YES  [ ] NO    HEALTH ISSUES - PROBLEM Dx:      MEDICATIONS  (STANDING):  apixaban 5 milliGRAM(s) Oral two times a day  atorvastatin 40 milliGRAM(s) Oral at bedtime  BACItracin   Ointment 1 Application(s) Topical two times a day  calcitriol   Capsule 0.5 MICROGram(s) Oral <User Schedule>  calcium acetate 667 milliGRAM(s) Oral four times a day with meals  clopidogrel Tablet 75 milliGRAM(s) Oral daily  cyanocobalamin 1000 MICROGram(s) Oral daily  darbepoetin Injectable Syringe 40 MICROGram(s) SubCutaneous every week  dextrose 40% Gel 15 Gram(s) Oral once  dextrose 5%. 1000 milliLiter(s) (50 mL/Hr) IV Continuous <Continuous>  dextrose 5%. 1000 milliLiter(s) (100 mL/Hr) IV Continuous <Continuous>  dextrose 50% Injectable 25 Gram(s) IV Push once  dextrose 50% Injectable 12.5 Gram(s) IV Push once  dextrose 50% Injectable 25 Gram(s) IV Push once  gabapentin 300 milliGRAM(s) Oral every 12 hours  glucagon  Injectable 1 milliGRAM(s) IntraMuscular once  influenza   Vaccine 0.5 milliLiter(s) IntraMuscular once  insulin glargine Injectable (LANTUS) 21 Unit(s) SubCutaneous at bedtime  insulin lispro (ADMELOG) corrective regimen sliding scale   SubCutaneous three times a day before meals  insulin lispro Injectable (ADMELOG) 7 Unit(s) SubCutaneous three times a day before meals  iron sucrose Injectable 100 milliGRAM(s) IV Push <User Schedule>  multivitamin 1 Tablet(s) Oral daily    MEDICATIONS  (PRN):  simethicone 80 milliGRAM(s) Chew four times a day PRN Dyspepsia

## 2021-11-14 LAB
ANION GAP SERPL CALC-SCNC: 17 MMOL/L — HIGH (ref 7–14)
BUN SERPL-MCNC: 47 MG/DL — HIGH (ref 10–20)
CALCIUM SERPL-MCNC: 8.1 MG/DL — LOW (ref 8.5–10.1)
CHLORIDE SERPL-SCNC: 105 MMOL/L — SIGNIFICANT CHANGE UP (ref 98–110)
CO2 SERPL-SCNC: 18 MMOL/L — SIGNIFICANT CHANGE UP (ref 17–32)
CREAT SERPL-MCNC: 4.7 MG/DL — CRITICAL HIGH (ref 0.7–1.5)
GLUCOSE BLDC GLUCOMTR-MCNC: 103 MG/DL — HIGH (ref 70–99)
GLUCOSE BLDC GLUCOMTR-MCNC: 134 MG/DL — HIGH (ref 70–99)
GLUCOSE BLDC GLUCOMTR-MCNC: 145 MG/DL — HIGH (ref 70–99)
GLUCOSE BLDC GLUCOMTR-MCNC: 189 MG/DL — HIGH (ref 70–99)
GLUCOSE SERPL-MCNC: 108 MG/DL — HIGH (ref 70–99)
HBV SURFACE AB SER-ACNC: SIGNIFICANT CHANGE UP
MAGNESIUM SERPL-MCNC: 1.6 MG/DL — LOW (ref 1.8–2.4)
PHOSPHATE SERPL-MCNC: 5.2 MG/DL — HIGH (ref 2.1–4.9)
POTASSIUM SERPL-MCNC: 4.8 MMOL/L — SIGNIFICANT CHANGE UP (ref 3.5–5)
POTASSIUM SERPL-SCNC: 4.8 MMOL/L — SIGNIFICANT CHANGE UP (ref 3.5–5)
SODIUM SERPL-SCNC: 140 MMOL/L — SIGNIFICANT CHANGE UP (ref 135–146)

## 2021-11-14 PROCEDURE — 99232 SBSQ HOSP IP/OBS MODERATE 35: CPT

## 2021-11-14 RX ADMIN — Medication 1: at 17:17

## 2021-11-14 RX ADMIN — Medication 1 APPLICATION(S): at 05:38

## 2021-11-14 RX ADMIN — Medication 1 TABLET(S): at 11:39

## 2021-11-14 RX ADMIN — APIXABAN 5 MILLIGRAM(S): 2.5 TABLET, FILM COATED ORAL at 05:38

## 2021-11-14 RX ADMIN — Medication 0: at 08:08

## 2021-11-14 RX ADMIN — Medication 667 MILLIGRAM(S): at 17:18

## 2021-11-14 RX ADMIN — GABAPENTIN 300 MILLIGRAM(S): 400 CAPSULE ORAL at 17:18

## 2021-11-14 RX ADMIN — Medication 667 MILLIGRAM(S): at 08:08

## 2021-11-14 RX ADMIN — INSULIN GLARGINE 21 UNIT(S): 100 INJECTION, SOLUTION SUBCUTANEOUS at 22:14

## 2021-11-14 RX ADMIN — Medication 1 APPLICATION(S): at 17:18

## 2021-11-14 RX ADMIN — Medication 7 UNIT(S): at 12:00

## 2021-11-14 RX ADMIN — APIXABAN 5 MILLIGRAM(S): 2.5 TABLET, FILM COATED ORAL at 17:18

## 2021-11-14 RX ADMIN — Medication 667 MILLIGRAM(S): at 11:39

## 2021-11-14 RX ADMIN — CLOPIDOGREL BISULFATE 75 MILLIGRAM(S): 75 TABLET, FILM COATED ORAL at 11:39

## 2021-11-14 RX ADMIN — Medication 667 MILLIGRAM(S): at 21:47

## 2021-11-14 RX ADMIN — GABAPENTIN 300 MILLIGRAM(S): 400 CAPSULE ORAL at 05:38

## 2021-11-14 RX ADMIN — ATORVASTATIN CALCIUM 40 MILLIGRAM(S): 80 TABLET, FILM COATED ORAL at 21:47

## 2021-11-14 RX ADMIN — Medication 7 UNIT(S): at 08:08

## 2021-11-14 RX ADMIN — PREGABALIN 1000 MICROGRAM(S): 225 CAPSULE ORAL at 11:39

## 2021-11-14 RX ADMIN — Medication 7 UNIT(S): at 17:17

## 2021-11-14 NOTE — PROGRESS NOTE ADULT - SUBJECTIVE AND OBJECTIVE BOX
[facility] FOLLOW UP NOTE  --------------------------------------------------------------------------------  Chief Complaint:    24 hour events/subjective:        PAST HISTORY  --------------------------------------------------------------------------------  No significant changes to PMH, PSH, FHx, SHx, unless otherwise noted    ALLERGIES & MEDICATIONS  --------------------------------------------------------------------------------  Allergies    No Known Allergies    Intolerances      Standing Inpatient Medications  apixaban 5 milliGRAM(s) Oral two times a day  atorvastatin 40 milliGRAM(s) Oral at bedtime  BACItracin   Ointment 1 Application(s) Topical two times a day  calcitriol   Capsule 0.5 MICROGram(s) Oral <User Schedule>  calcium acetate 667 milliGRAM(s) Oral four times a day with meals  clopidogrel Tablet 75 milliGRAM(s) Oral daily  cyanocobalamin 1000 MICROGram(s) Oral daily  darbepoetin Injectable Syringe 40 MICROGram(s) SubCutaneous every week  dextrose 40% Gel 15 Gram(s) Oral once  dextrose 5%. 1000 milliLiter(s) IV Continuous <Continuous>  dextrose 5%. 1000 milliLiter(s) IV Continuous <Continuous>  dextrose 50% Injectable 25 Gram(s) IV Push once  dextrose 50% Injectable 12.5 Gram(s) IV Push once  dextrose 50% Injectable 25 Gram(s) IV Push once  gabapentin 300 milliGRAM(s) Oral every 12 hours  glucagon  Injectable 1 milliGRAM(s) IntraMuscular once  influenza   Vaccine 0.5 milliLiter(s) IntraMuscular once  insulin glargine Injectable (LANTUS) 21 Unit(s) SubCutaneous at bedtime  insulin lispro (ADMELOG) corrective regimen sliding scale   SubCutaneous three times a day before meals  insulin lispro Injectable (ADMELOG) 7 Unit(s) SubCutaneous three times a day before meals  iron sucrose Injectable 100 milliGRAM(s) IV Push <User Schedule>  multivitamin 1 Tablet(s) Oral daily    PRN Inpatient Medications  simethicone 80 milliGRAM(s) Chew four times a day PRN      REVIEW OF SYSTEMS  --------------------------------------------------------------------------------  Gen: No weight changes, fatigue, fevers/chills, weakness  Skin: No rashes  Head/Eyes/Ears/Mouth: No headache; Normal hearing; Normal vision w/o blurriness; No sinus pain/discomfort, sore throat  Respiratory: No dyspnea, cough, wheezing, hemoptysis  CV: No chest pain, PND, orthopnea  GI: No abdominal pain, diarrhea, constipation, nausea, vomiting, melena, hematochezia  : No increased frequency, dysuria, hematuria, nocturia  MSK: No joint pain/swelling; no back pain; no edema  Neuro: No dizziness/lightheadedness, weakness, seizures, numbness, tingling  Heme: No easy bruising or bleeding  Endo: No heat/cold intolerance  Psych: No significant nervousness, anxiety, stress, depression    All other systems were reviewed and are negative, except as noted.    VITALS/PHYSICAL EXAM  --------------------------------------------------------------------------------  T(C): 36.1 (11-14-21 @ 05:02), Max: 36.4 (11-13-21 @ 20:00)  HR: 89 (11-14-21 @ 05:02) (76 - 99)  BP: 138/70 (11-14-21 @ 05:02) (123/57 - 173/79)  RR: 18 (11-14-21 @ 05:02) (18 - 18)  SpO2: 100% (11-13-21 @ 20:01) (100% - 100%)  Wt(kg): --        11-13-21 @ 07:01  -  11-14-21 @ 07:00  --------------------------------------------------------  IN: 160 mL / OUT: 3400 mL / NET: -3240 mL      Physical Exam:  	Gen: NAD, well-appearing  	HEENT: PERRL, supple neck, clear oropharynx  	Pulm: CTA B/L  	CV: RRR, S1S2; no rub  	Back: No spinal or CVA tenderness; no sacral edema  	Abd: +BS, soft, nontender/nondistended  	: No suprapubic tenderness  	UE: Warm, FROM, no clubbing, intact strength; no edema; no asterixis  	LE: Warm, FROM, no clubbing, intact strength; no edema  	Neuro: No focal deficits, intact gait  	Psych: Normal affect and mood  	Skin: Warm, without rashes  	Vascular access:    LABS/STUDIES  --------------------------------------------------------------------------------    140  |  105  |  47  ----------------------------<  108      [11-14-21 @ 06:17]  4.8   |  18  |  4.7        Ca     8.1     [11-14-21 @ 06:17]      Mg     1.6     [11-14-21 @ 06:17]      Phos  5.2     [11-14-21 @ 06:17]            Creatinine Trend:  SCr 4.7 [11-14 @ 06:17]  SCr 4.2 [11-13 @ 18:10]  SCr 5.2 [11-12 @ 07:11]  SCr 5.3 [11-09 @ 04:30]  SCr 7.5 [11-08 @ 08:00]          HBsAb Nonreact      [11-13-21 @ 09:30]  HBsAg Nonreact      [11-08-21 @ 15:44]  HCV 0.12, Nonreact      [11-08-21 @ 15:44]

## 2021-11-14 NOTE — PROGRESS NOTE ADULT - SUBJECTIVE AND OBJECTIVE BOX
ALEXANDRO MONROE  56y  Male      Patient is a 56y old  Male who presents with a chief complaint of Inadequate social support (14 Nov 2021 10:31)      INTERVAL HPI/OVERNIGHT EVENTS: no acute events overnight. no nursing concerns. no major complaints. s/p HD session.       REVIEW OF SYSTEMS:  CONSTITUTIONAL: No fever, weight loss, or fatigue  RESPIRATORY: No cough, wheezing, chills or hemoptysis; No shortness of breath  CARDIOVASCULAR: No chest pain, palpitations, dizziness, or leg swelling  GASTROINTESTINAL: No abdominal or epigastric pain. No nausea, vomiting, or hematemesis; No diarrhea or constipation. No melena or hematochezia.  GENITOURINARY: No dysuria, frequency, hematuria, or incontinence  NEUROLOGICAL: No headaches, memory loss, loss of strength, numbness, or tremors  SKIN: No itching, burning, rashes, or lesions   MUSCULOSKELETAL: No joint pain or swelling; No muscle, back, or extremity pain  PSYCHIATRIC: No depression, anxiety, mood swings, or difficulty sleeping  All other review of systems negative    VITALS  T(C): 36.1 (11-14-21 @ 05:02), Max: 36.4 (11-13-21 @ 20:00)  HR: 89 (11-14-21 @ 05:02) (76 - 99)  BP: 138/70 (11-14-21 @ 05:02) (123/57 - 173/79)  RR: 18 (11-14-21 @ 05:02) (18 - 18)  SpO2: 100% (11-13-21 @ 20:01) (100% - 100%)  Wt(kg): --Vital Signs Last 24 Hrs  T(C): 36.1 (14 Nov 2021 05:02), Max: 36.4 (13 Nov 2021 20:00)  T(F): 97 (14 Nov 2021 05:02), Max: 97.6 (13 Nov 2021 20:00)  HR: 89 (14 Nov 2021 05:02) (76 - 99)  BP: 138/70 (14 Nov 2021 05:02) (123/57 - 173/79)  BP(mean): --  RR: 18 (14 Nov 2021 05:02) (18 - 18)  SpO2: 100% (13 Nov 2021 20:01) (100% - 100%)      11-13-21 @ 07:01  -  11-14-21 @ 07:00  --------------------------------------------------------  IN: 160 mL / OUT: 3400 mL / NET: -3240 mL        PHYSICAL EXAM:  GENERAL: elderly M, NAD, non toxic appearing  EYES: anicteric sclera, non injected conjunctiva, EOMI  ENT: hearing grossly intact, oropharynx clear, MMM  PSYCH: no agitation, baseline mentation  NERVOUS SYSTEM:  Alert & Oriented X3, CN 2-12 grossly intact  PULMONARY: Clear to percussion bilaterally; No rales, rhonchi, wheezing, or rubs  CARDIOVASCULAR: Regular rate and rhythm; No murmurs, rubs, or gallops  GI: Soft, Nontender, Nondistended; Bowel sounds present  EXTREMITIES:  2+ Peripheral Pulses, No clubbing, cyanosis, or edema  LYMPH: No lymphadenopathy noted  SKIN: No rashes or lesions    Consultant(s) Notes Reviewed:  [x ] YES  [ ] NO    Discussed with Consultants/Other Providers [ x] YES     LABS      11-14    140  |  105  |  47<H>  ----------------------------<  108<H>  4.8   |  18  |  4.7<HH>    Ca    8.1<L>      14 Nov 2021 06:17  Phos  5.2     11-14  Mg     1.6     11-14    POCT Blood Glucose.: 134 mg/dL (14 Nov 2021 11:31)    RADIOLOGY & ADDITIONAL TESTS:  - no images 11/14  Imaging Personally Reviewed:  [ ] YES  [ ] NO    HEALTH ISSUES - PROBLEM Dx:      MEDICATIONS  (STANDING):  apixaban 5 milliGRAM(s) Oral two times a day  atorvastatin 40 milliGRAM(s) Oral at bedtime  BACItracin   Ointment 1 Application(s) Topical two times a day  calcitriol   Capsule 0.5 MICROGram(s) Oral <User Schedule>  calcium acetate 667 milliGRAM(s) Oral four times a day with meals  clopidogrel Tablet 75 milliGRAM(s) Oral daily  cyanocobalamin 1000 MICROGram(s) Oral daily  darbepoetin Injectable Syringe 40 MICROGram(s) SubCutaneous every week  dextrose 40% Gel 15 Gram(s) Oral once  dextrose 5%. 1000 milliLiter(s) (50 mL/Hr) IV Continuous <Continuous>  dextrose 5%. 1000 milliLiter(s) (100 mL/Hr) IV Continuous <Continuous>  dextrose 50% Injectable 25 Gram(s) IV Push once  dextrose 50% Injectable 12.5 Gram(s) IV Push once  dextrose 50% Injectable 25 Gram(s) IV Push once  gabapentin 300 milliGRAM(s) Oral every 12 hours  glucagon  Injectable 1 milliGRAM(s) IntraMuscular once  influenza   Vaccine 0.5 milliLiter(s) IntraMuscular once  insulin glargine Injectable (LANTUS) 21 Unit(s) SubCutaneous at bedtime  insulin lispro (ADMELOG) corrective regimen sliding scale   SubCutaneous three times a day before meals  insulin lispro Injectable (ADMELOG) 7 Unit(s) SubCutaneous three times a day before meals  iron sucrose Injectable 100 milliGRAM(s) IV Push <User Schedule>  multivitamin 1 Tablet(s) Oral daily    MEDICATIONS  (PRN):  simethicone 80 milliGRAM(s) Chew four times a day PRN Dyspepsia

## 2021-11-14 NOTE — PROGRESS NOTE ADULT - ASSESSMENT
CKD pt , was unhappy in his nursing home situation and wishes for placement change , clinically stable at this point , labs reviewed , discussed with patient

## 2021-11-14 NOTE — PROGRESS NOTE ADULT - ASSESSMENT
55 yo M with PMH of DM, CAD s/p Stents, ESRD on HD sat/tu/th presented from [facility] for eval of agitation. As per NH pt became agitated and refused medications and dialysis yesterday. Per patient he had inadequate care at NH and wanted to go home with home care.    # ESRD on HD (TTS).   - calcium acetate 667 milliGRAM(s) Oral four times a day with meals  - iron sucrose Injectable 100 milliGRAM(s) IV Push <User Schedule>  - Renal consult for in-pt HD      - HD (last on 11/11); 3hrs, 2L UF; HD today 11/13      - on venofer; check iron stores , h/h at goal       - last ph level noted, started pholso 1 tab qac.  Check iPTH      - BP controlled      # History of b12 def  - cyanocobalamin 1000 MICROGram(s) Oral daily    # DM neuropathy  - gabapentin 300 milliGRAM(s) Oral every 12 hours      # Diabetes mellitus with hyperglycemia  - A1c: 5.7  - insulin glargine Injectable (LANTUS) 21 Unit(s) SubCutaneous at bedtime  - insulin lispro (ADMELOG) corrective regimen sliding scale   SubCutaneous three times a day before meals  - insulin lispro Injectable (ADMELOG) 7 Unit(s) SubCutaneous three times a day before meals    # Normocytic anemia, at goal for ESRD pt.  -  Pt denies any bleeding symptoms. Replace as necessary.   - venofer as above  - darbepoetin Injectable Syringe 40 MICROGram(s) SubCutaneous every week    # HAGMA (High AG Metabolic Acidosis)  - from uremic acidosis. On HD.    # Hx of CAD s/p Stents.  - atorvastatin 40 milliGRAM(s) Oral at bedtime  - clopidogrel Tablet 75 milliGRAM(s) Oral daily    # H/O DVT:   - per patient he was on coumadin for 15 years, recently switched to Eliquis. unsure why he is on it indefinitely(provoked vs unprovoked PE)- poor historian.  - apixaban 5 milliGRAM(s) Oral two times a day  - will need to follow up with PCP once discharged and continue conversation about ongoing need       #Progress Note Handoff:  Pending (specify): HD today. Auth and placement at new STR facility in .  Family discussion: d/w pt   Disposition: CM provided patient with the following STR that making a bed offer to patient.  [facility] (formerly [phone]. Patient states that he will research it and call facility. Patient gave CM the ok for facility to call him.

## 2021-11-15 LAB
GLUCOSE BLDC GLUCOMTR-MCNC: 175 MG/DL — HIGH (ref 70–99)
GLUCOSE BLDC GLUCOMTR-MCNC: 191 MG/DL — HIGH (ref 70–99)
GLUCOSE BLDC GLUCOMTR-MCNC: 203 MG/DL — HIGH (ref 70–99)
GLUCOSE BLDC GLUCOMTR-MCNC: 225 MG/DL — HIGH (ref 70–99)

## 2021-11-15 PROCEDURE — 99232 SBSQ HOSP IP/OBS MODERATE 35: CPT

## 2021-11-15 RX ADMIN — Medication 667 MILLIGRAM(S): at 22:11

## 2021-11-15 RX ADMIN — PREGABALIN 1000 MICROGRAM(S): 225 CAPSULE ORAL at 11:56

## 2021-11-15 RX ADMIN — GABAPENTIN 300 MILLIGRAM(S): 400 CAPSULE ORAL at 17:06

## 2021-11-15 RX ADMIN — INSULIN GLARGINE 21 UNIT(S): 100 INJECTION, SOLUTION SUBCUTANEOUS at 22:12

## 2021-11-15 RX ADMIN — Medication 7 UNIT(S): at 07:59

## 2021-11-15 RX ADMIN — CLOPIDOGREL BISULFATE 75 MILLIGRAM(S): 75 TABLET, FILM COATED ORAL at 11:55

## 2021-11-15 RX ADMIN — APIXABAN 5 MILLIGRAM(S): 2.5 TABLET, FILM COATED ORAL at 17:06

## 2021-11-15 RX ADMIN — Medication 7 UNIT(S): at 11:55

## 2021-11-15 RX ADMIN — Medication 1 APPLICATION(S): at 06:03

## 2021-11-15 RX ADMIN — Medication 667 MILLIGRAM(S): at 08:00

## 2021-11-15 RX ADMIN — Medication 1: at 11:54

## 2021-11-15 RX ADMIN — Medication 7 UNIT(S): at 17:05

## 2021-11-15 RX ADMIN — Medication 1: at 17:05

## 2021-11-15 RX ADMIN — ATORVASTATIN CALCIUM 40 MILLIGRAM(S): 80 TABLET, FILM COATED ORAL at 22:11

## 2021-11-15 RX ADMIN — Medication 2: at 07:58

## 2021-11-15 RX ADMIN — GABAPENTIN 300 MILLIGRAM(S): 400 CAPSULE ORAL at 06:02

## 2021-11-15 RX ADMIN — Medication 667 MILLIGRAM(S): at 11:55

## 2021-11-15 RX ADMIN — APIXABAN 5 MILLIGRAM(S): 2.5 TABLET, FILM COATED ORAL at 06:02

## 2021-11-15 RX ADMIN — Medication 1 APPLICATION(S): at 17:06

## 2021-11-15 RX ADMIN — Medication 1 TABLET(S): at 11:55

## 2021-11-15 RX ADMIN — Medication 667 MILLIGRAM(S): at 17:06

## 2021-11-15 NOTE — PROGRESS NOTE ADULT - SUBJECTIVE AND OBJECTIVE BOX
MABEL ALEXANDRO  56y  Male      Patient is a 56y old  Male who presents with a chief complaint of Inadequate social support (15 Nov 2021 09:43)      INTERVAL HPI/OVERNIGHT EVENTS: no acute events overnight. patient       REVIEW OF SYSTEMS:  CONSTITUTIONAL: No fever, weight loss, or fatigue  RESPIRATORY: No cough, wheezing, chills or hemoptysis; No shortness of breath  CARDIOVASCULAR: No chest pain, palpitations, dizziness, or leg swelling  GASTROINTESTINAL: No abdominal or epigastric pain. No nausea, vomiting, or hematemesis; No diarrhea or constipation. No melena or hematochezia.  GENITOURINARY: No dysuria, frequency, hematuria, or incontinence  NEUROLOGICAL: No headaches, memory loss, loss of strength, numbness, or tremors  SKIN: No itching, burning, rashes, or lesions   MUSCULOSKELETAL: No joint pain or swelling; No muscle, back, or extremity pain  PSYCHIATRIC: No depression, anxiety, mood swings, or difficulty sleeping  All other review of systems negative    T(C): 36.4 (11-14-21 @ 22:30), Max: 36.4 (11-14-21 @ 22:30)  HR: 93 (11-14-21 @ 22:30) (93 - 93)  BP: 167/78 (11-14-21 @ 22:30) (167/78 - 167/78)  RR: 20 (11-14-21 @ 22:30) (20 - 20)  SpO2: --  Wt(kg): --Vital Signs Last 24 Hrs  T(C): 36.4 (14 Nov 2021 22:30), Max: 36.4 (14 Nov 2021 22:30)  T(F): 97.5 (14 Nov 2021 22:30), Max: 97.5 (14 Nov 2021 22:30)  HR: 93 (14 Nov 2021 22:30) (93 - 93)  BP: 167/78 (14 Nov 2021 22:30) (167/78 - 167/78)  BP(mean): --  RR: 20 (14 Nov 2021 22:30) (20 - 20)  SpO2: --        PHYSICAL EXAM:  GENERAL: NAD, well-groomed, well-developed  PSYCH: no agitation, baseline mentation  NERVOUS SYSTEM:  Alert & Oriented X3, Motor Strength 5/5 B/L upper and lower extremities; Sensory intact; FTN WNL  PULMONARY: Clear to percussion bilaterally; No rales, rhonchi, wheezing, or rubs  CARDIOVASCULAR: Regular rate and rhythm; No murmurs, rubs, or gallops  GI: Soft, Nontender, Nondistended; Bowel sounds present  EXTREMITIES:  2+ Peripheral Pulses, No clubbing, cyanosis, or edema  LYMPH: No lymphadenopathy noted  SKIN: No rashes or lesions    Consultant(s) Notes Reviewed:  [x ] YES  [ ] NO    Discussed with Consultants/Other Providers [ x] YES     LABS      11-14    140  |  105  |  47<H>  ----------------------------<  108<H>  4.8   |  18  |  4.7<HH>    Ca    8.1<L>      14 Nov 2021 06:17  Phos  5.2     11-14  Mg     1.6     11-14            Lactate Trend        CAPILLARY BLOOD GLUCOSE      POCT Blood Glucose.: 191 mg/dL (15 Nov 2021 11:19)        RADIOLOGY & ADDITIONAL TESTS:    Imaging Personally Reviewed:  [ ] YES  [ ] NO    HEALTH ISSUES - PROBLEM Dx:         MABELGILDARDOKIANA  56y  Male      Patient is a 56y old  Male who presents with a chief complaint of Inadequate social support (15 Nov 2021 09:43)      INTERVAL HPI/OVERNIGHT EVENTS: no acute events overnight. patient       REVIEW OF SYSTEMS:  CONSTITUTIONAL: No fever, weight loss, or fatigue  RESPIRATORY: No cough, wheezing, chills or hemoptysis; No shortness of breath  CARDIOVASCULAR: No chest pain, palpitations, dizziness, or leg swelling  GASTROINTESTINAL: No abdominal or epigastric pain. No nausea, vomiting, or hematemesis; No diarrhea or constipation. No melena or hematochezia.  GENITOURINARY: No dysuria, frequency, hematuria, or incontinence  NEUROLOGICAL: No headaches, memory loss, loss of strength, numbness, or tremors  SKIN: No itching, burning, rashes, or lesions   MUSCULOSKELETAL: No joint pain or swelling; No muscle, back, or extremity pain  PSYCHIATRIC: No depression, anxiety, mood swings, or difficulty sleeping  All other review of systems negative    T(C): 36.4 (11-14-21 @ 22:30), Max: 36.4 (11-14-21 @ 22:30)  HR: 93 (11-14-21 @ 22:30) (93 - 93)  BP: 167/78 (11-14-21 @ 22:30) (167/78 - 167/78)  RR: 20 (11-14-21 @ 22:30) (20 - 20)  SpO2: --  Wt(kg): --Vital Signs Last 24 Hrs  T(C): 36.4 (14 Nov 2021 22:30), Max: 36.4 (14 Nov 2021 22:30)  T(F): 97.5 (14 Nov 2021 22:30), Max: 97.5 (14 Nov 2021 22:30)  HR: 93 (14 Nov 2021 22:30) (93 - 93)  BP: 167/78 (14 Nov 2021 22:30) (167/78 - 167/78)  BP(mean): --  RR: 20 (14 Nov 2021 22:30) (20 - 20)  SpO2: --        PHYSICAL EXAM:  GENERAL: elderly M, NAD, non toxic appearing  EYES: anicteric sclera, non injected conjunctiva, EOMI  ENT: hearing grossly intact, oropharynx clear, MMM  PSYCH: no agitation, baseline mentation  NERVOUS SYSTEM:  Alert & Oriented X3, CN 2-12 grossly intact  PULMONARY: Clear to percussion bilaterally; No rales, rhonchi, wheezing, or rubs  CARDIOVASCULAR: Regular rate and rhythm; No murmurs, rubs, or gallops  GI: Soft, Nontender, Nondistended; Bowel sounds present  EXTREMITIES:  2+ Peripheral Pulses, No clubbing, cyanosis, or edema  LYMPH: No lymphadenopathy noted  SKIN: No rashes or lesions    Consultant(s) Notes Reviewed:  [x ] YES  [ ] NO    Discussed with Consultants/Other Providers [ x] YES     LABS      11-14    140  |  105  |  47<H>  ----------------------------<  108<H>  4.8   |  18  |  4.7<HH>    Ca    8.1<L>      14 Nov 2021 06:17  Phos  5.2     11-14  Mg     1.6     11-14    POCT Blood Glucose.: 191 mg/dL (15 Nov 2021 11:19)    RADIOLOGY & ADDITIONAL TESTS:  - no images 11/15    Imaging Personally Reviewed:  [ ] YES  [ ] NO    HEALTH ISSUES - PROBLEM Dx:      MEDICATIONS  (STANDING):  apixaban 5 milliGRAM(s) Oral two times a day  atorvastatin 40 milliGRAM(s) Oral at bedtime  BACItracin   Ointment 1 Application(s) Topical two times a day  calcitriol   Capsule 0.5 MICROGram(s) Oral <User Schedule>  calcium acetate 667 milliGRAM(s) Oral four times a day with meals  clopidogrel Tablet 75 milliGRAM(s) Oral daily  cyanocobalamin 1000 MICROGram(s) Oral daily  darbepoetin Injectable Syringe 40 MICROGram(s) SubCutaneous every week  dextrose 40% Gel 15 Gram(s) Oral once  dextrose 5%. 1000 milliLiter(s) (50 mL/Hr) IV Continuous <Continuous>  dextrose 5%. 1000 milliLiter(s) (100 mL/Hr) IV Continuous <Continuous>  dextrose 50% Injectable 25 Gram(s) IV Push once  dextrose 50% Injectable 12.5 Gram(s) IV Push once  dextrose 50% Injectable 25 Gram(s) IV Push once  gabapentin 300 milliGRAM(s) Oral every 12 hours  glucagon  Injectable 1 milliGRAM(s) IntraMuscular once  influenza   Vaccine 0.5 milliLiter(s) IntraMuscular once  insulin glargine Injectable (LANTUS) 21 Unit(s) SubCutaneous at bedtime  insulin lispro (ADMELOG) corrective regimen sliding scale   SubCutaneous three times a day before meals  insulin lispro Injectable (ADMELOG) 7 Unit(s) SubCutaneous three times a day before meals  iron sucrose Injectable 100 milliGRAM(s) IV Push <User Schedule>  multivitamin 1 Tablet(s) Oral daily    MEDICATIONS  (PRN):  simethicone 80 milliGRAM(s) Chew four times a day PRN Dyspepsia

## 2021-11-15 NOTE — PROGRESS NOTE ADULT - ASSESSMENT
55 yo M with PMH of DM, CAD s/p Stents, ESRD on HD sat/tu/th presented from [facility] for eval of agitation. As per NH pt became agitated and refused medications and dialysis yesterday. Per patient he had inadequate care at NH and wanted to go home with home care.    # ESRD on HD (TTS).   - calcium acetate 667 milliGRAM(s) Oral four times a day with meals  - iron sucrose Injectable 100 milliGRAM(s) IV Push <User Schedule>  - Renal consult for in-pt HD      - HD (last on 11/13); 3hrs, 2L UF; HD next 11/16      # History of b12 def  - cyanocobalamin 1000 MICROGram(s) Oral daily    # DM neuropathy  - gabapentin 300 milliGRAM(s) Oral every 12 hours      # Diabetes mellitus with hyperglycemia  - A1c: 5.7  - insulin glargine Injectable (LANTUS) 21 Unit(s) SubCutaneous at bedtime  - insulin lispro (ADMELOG) corrective regimen sliding scale   SubCutaneous three times a day before meals  - insulin lispro Injectable (ADMELOG) 7 Unit(s) SubCutaneous three times a day before meals    # Normocytic anemia, at goal for ESRD pt.  -  Pt denies any bleeding symptoms. Replace as necessary.   - venofer as above  - darbepoetin Injectable Syringe 40 MICROGram(s) SubCutaneous every week    # HAGMA (High AG Metabolic Acidosis)  - from uremic acidosis. On HD.    # Hx of CAD s/p Stents.  - atorvastatin 40 milliGRAM(s) Oral at bedtime  - clopidogrel Tablet 75 milliGRAM(s) Oral daily    # H/O DVT:   - per patient he was on coumadin for 15 years, recently switched to Eliquis. unsure why he is on it indefinitely(provoked vs unprovoked PE)- poor historian.  - apixaban 5 milliGRAM(s) Oral two times a day  - will need to follow up with PCP once discharged and continue conversation about ongoing need       #Progress Note Handoff:  Pending (specify): HD today. Auth and placement at new STR facility in .  Family discussion: d/w pt   Disposition: CM provided patient with the following STR that making a bed offer to patient.  [facility] (formerly [phone]. Patient states that he will not go to facility. Will be discharged and appeal.

## 2021-11-15 NOTE — PROGRESS NOTE ADULT - SUBJECTIVE AND OBJECTIVE BOX
Nephrology progress note    THIS IS AN INCOMPLETE NOTE . FULL NOTE TO FOLLOW SHORTLY    Patient is seen and examined, events over the last 24 h noted .    Allergies:  No Known Allergies    Hospital Medications:   MEDICATIONS  (STANDING):  apixaban 5 milliGRAM(s) Oral two times a day  atorvastatin 40 milliGRAM(s) Oral at bedtime  BACItracin   Ointment 1 Application(s) Topical two times a day  calcitriol   Capsule 0.5 MICROGram(s) Oral <User Schedule>  calcium acetate 667 milliGRAM(s) Oral four times a day with meals  clopidogrel Tablet 75 milliGRAM(s) Oral daily  cyanocobalamin 1000 MICROGram(s) Oral daily  darbepoetin Injectable Syringe 40 MICROGram(s) SubCutaneous every week  dextrose 40% Gel 15 Gram(s) Oral once  dextrose 5%. 1000 milliLiter(s) (50 mL/Hr) IV Continuous <Continuous>  dextrose 5%. 1000 milliLiter(s) (100 mL/Hr) IV Continuous <Continuous>  dextrose 50% Injectable 25 Gram(s) IV Push once  dextrose 50% Injectable 12.5 Gram(s) IV Push once  dextrose 50% Injectable 25 Gram(s) IV Push once  gabapentin 300 milliGRAM(s) Oral every 12 hours  glucagon  Injectable 1 milliGRAM(s) IntraMuscular once  influenza   Vaccine 0.5 milliLiter(s) IntraMuscular once  insulin glargine Injectable (LANTUS) 21 Unit(s) SubCutaneous at bedtime  insulin lispro (ADMELOG) corrective regimen sliding scale   SubCutaneous three times a day before meals  insulin lispro Injectable (ADMELOG) 7 Unit(s) SubCutaneous three times a day before meals  iron sucrose Injectable 100 milliGRAM(s) IV Push <User Schedule>  multivitamin 1 Tablet(s) Oral daily        VITALS:  T(F): 97.5 (11-14-21 @ 22:30), Max: 97.5 (11-14-21 @ 22:30)  HR: 93 (11-14-21 @ 22:30)  BP: 167/78 (11-14-21 @ 22:30)  RR: 20 (11-14-21 @ 22:30)  SpO2: --  Wt(kg): --    11-13 @ 07:01  -  11-14 @ 07:00  --------------------------------------------------------  IN: 160 mL / OUT: 3400 mL / NET: -3240 mL          PHYSICAL EXAM:  Constitutional: NAD  HEENT: anicteric sclera, oropharynx clear, MMM  Neck: No JVD  Respiratory: CTAB, no wheezes, rales or rhonchi  Cardiovascular: S1, S2, RRR  Gastrointestinal: BS+, soft, NT/ND  Extremities: No cyanosis or clubbing. No peripheral edema  :  No callahan.   Skin: No rashes    LABS:  11-14    140  |  105  |  47<H>  ----------------------------<  108<H>  4.8   |  18  |  4.7<HH>    Ca    8.1<L>      14 Nov 2021 06:17  Phos  5.2     11-14  Mg     1.6     11-14          Urine Studies:      RADIOLOGY & ADDITIONAL STUDIES:   Nephrology progress note  Patient is seen and examined, events over the last 24 h noted .  no events no complaints     Allergies:  No Known Allergies    Hospital Medications:   MEDICATIONS  (STANDING):    apixaban 5 milliGRAM(s) Oral two times a day  atorvastatin 40 milliGRAM(s) Oral at bedtime  BACItracin   Ointment 1 Application(s) Topical two times a day  calcitriol   Capsule 0.5 MICROGram(s) Oral <User Schedule>  calcium acetate 667 milliGRAM(s) Oral four times a day with meals  clopidogrel Tablet 75 milliGRAM(s) Oral daily  cyanocobalamin 1000 MICROGram(s) Oral daily  darbepoetin Injectable Syringe 40 MICROGram(s) SubCutaneous every week  gabapentin 300 milliGRAM(s) Oral every 12 hours  glucagon  Injectable 1 milliGRAM(s) IntraMuscular once  influenza   Vaccine 0.5 milliLiter(s) IntraMuscular once  insulin glargine Injectable (LANTUS) 21 Unit(s) SubCutaneous at bedtime  insulin lispro (ADMELOG) corrective regimen sliding scale   SubCutaneous three times a day before meals  insulin lispro Injectable (ADMELOG) 7 Unit(s) SubCutaneous three times a day before meals  iron sucrose Injectable 100 milliGRAM(s) IV Push <User Schedule>  multivitamin 1 Tablet(s) Oral daily        VITALS:  T(F): 97.5 (11-14-21 @ 22:30), Max: 97.5 (11-14-21 @ 22:30)  HR: 93 (11-14-21 @ 22:30)  BP: 167/78 (11-14-21 @ 22:30)  RR: 20 (11-14-21 @ 22:30)      11-13 @ 07:01  -  11-14 @ 07:00  --------------------------------------------------------  IN: 160 mL / OUT: 3400 mL / NET: -3240 mL          PHYSICAL EXAM:  Constitutional: NAD  Neck: No JVD  Respiratory: CTAB,  Cardiovascular: S1, S2, RRR  Gastrointestinal: BS+, soft, NT/ND  Extremities: No cyanosis or clubbing. No peripheral edema  :  No callahan.   Skin: No rashes    LABS:  11-14    140  |  105  |  47<H>  ----------------------------<  108<H>  4.8   |  18  |  4.7<HH>    Ca    8.1<L>      14 Nov 2021 06:17  Phos  5.2     11-14  Mg     1.6     11-14          Urine Studies:      RADIOLOGY & ADDITIONAL STUDIES:

## 2021-11-15 NOTE — PROGRESS NOTE ADULT - ASSESSMENT
55 yo M with PMH of DM, CAD s/p Stents, ESRD on HD  at [facility] presented from [facility] for eval of agitation. As per NH pt became agitated and refused medications.  Patient stated that he does not want to go back to [facility] and he wants to do HHD   # ESRD on HD  # MBD  # Normocytic anemia   # HTN  - HD in AM / last HD on Saturday   - on venofer; check iron stores , h/h noted , on PARISA will follow repeat   - last ph level noted, cont pholso 1 tab qac.  Check iPTH/ repeat IP   -decrease Gabapentin to 300 mg qd as per previous notes   - BP controlled from before / follow readings   - pending placement  - will follow

## 2021-11-16 LAB
ALBUMIN SERPL ELPH-MCNC: 3.1 G/DL — LOW (ref 3.5–5.2)
ALP SERPL-CCNC: 167 U/L — HIGH (ref 30–115)
ALT FLD-CCNC: 28 U/L — SIGNIFICANT CHANGE UP (ref 0–41)
ANION GAP SERPL CALC-SCNC: 22 MMOL/L — HIGH (ref 7–14)
AST SERPL-CCNC: 24 U/L — SIGNIFICANT CHANGE UP (ref 0–41)
BASOPHILS # BLD AUTO: 0.05 K/UL — SIGNIFICANT CHANGE UP (ref 0–0.2)
BASOPHILS NFR BLD AUTO: 0.7 % — SIGNIFICANT CHANGE UP (ref 0–1)
BILIRUB SERPL-MCNC: <0.2 MG/DL — SIGNIFICANT CHANGE UP (ref 0.2–1.2)
BUN SERPL-MCNC: 82 MG/DL — CRITICAL HIGH (ref 10–20)
CALCIUM SERPL-MCNC: 8.1 MG/DL — LOW (ref 8.5–10.1)
CHLORIDE SERPL-SCNC: 100 MMOL/L — SIGNIFICANT CHANGE UP (ref 98–110)
CO2 SERPL-SCNC: 15 MMOL/L — LOW (ref 17–32)
CREAT SERPL-MCNC: 6.3 MG/DL — CRITICAL HIGH (ref 0.7–1.5)
EOSINOPHIL # BLD AUTO: 0.54 K/UL — SIGNIFICANT CHANGE UP (ref 0–0.7)
EOSINOPHIL NFR BLD AUTO: 7.6 % — SIGNIFICANT CHANGE UP (ref 0–8)
GLUCOSE BLDC GLUCOMTR-MCNC: 152 MG/DL — HIGH (ref 70–99)
GLUCOSE BLDC GLUCOMTR-MCNC: 163 MG/DL — HIGH (ref 70–99)
GLUCOSE BLDC GLUCOMTR-MCNC: 197 MG/DL — HIGH (ref 70–99)
GLUCOSE BLDC GLUCOMTR-MCNC: 247 MG/DL — HIGH (ref 70–99)
GLUCOSE SERPL-MCNC: 169 MG/DL — HIGH (ref 70–99)
HCT VFR BLD CALC: 29.6 % — LOW (ref 42–52)
HGB BLD-MCNC: 9.4 G/DL — LOW (ref 14–18)
IMM GRANULOCYTES NFR BLD AUTO: 0.3 % — SIGNIFICANT CHANGE UP (ref 0.1–0.3)
LYMPHOCYTES # BLD AUTO: 1.54 K/UL — SIGNIFICANT CHANGE UP (ref 1.2–3.4)
LYMPHOCYTES # BLD AUTO: 21.7 % — SIGNIFICANT CHANGE UP (ref 20.5–51.1)
MAGNESIUM SERPL-MCNC: 1.7 MG/DL — LOW (ref 1.8–2.4)
MCHC RBC-ENTMCNC: 28.4 PG — SIGNIFICANT CHANGE UP (ref 27–31)
MCHC RBC-ENTMCNC: 31.8 G/DL — LOW (ref 32–37)
MCV RBC AUTO: 89.4 FL — SIGNIFICANT CHANGE UP (ref 80–94)
MONOCYTES # BLD AUTO: 0.45 K/UL — SIGNIFICANT CHANGE UP (ref 0.1–0.6)
MONOCYTES NFR BLD AUTO: 6.3 % — SIGNIFICANT CHANGE UP (ref 1.7–9.3)
NEUTROPHILS # BLD AUTO: 4.49 K/UL — SIGNIFICANT CHANGE UP (ref 1.4–6.5)
NEUTROPHILS NFR BLD AUTO: 63.4 % — SIGNIFICANT CHANGE UP (ref 42.2–75.2)
NRBC # BLD: 0 /100 WBCS — SIGNIFICANT CHANGE UP (ref 0–0)
PHOSPHATE SERPL-MCNC: 5.8 MG/DL — HIGH (ref 2.1–4.9)
PLATELET # BLD AUTO: 188 K/UL — SIGNIFICANT CHANGE UP (ref 130–400)
POTASSIUM SERPL-MCNC: 5.3 MMOL/L — HIGH (ref 3.5–5)
POTASSIUM SERPL-SCNC: 5.3 MMOL/L — HIGH (ref 3.5–5)
PROT SERPL-MCNC: 5.7 G/DL — LOW (ref 6–8)
RBC # BLD: 3.31 M/UL — LOW (ref 4.7–6.1)
RBC # FLD: 13.3 % — SIGNIFICANT CHANGE UP (ref 11.5–14.5)
SODIUM SERPL-SCNC: 137 MMOL/L — SIGNIFICANT CHANGE UP (ref 135–146)
WBC # BLD: 7.09 K/UL — SIGNIFICANT CHANGE UP (ref 4.8–10.8)
WBC # FLD AUTO: 7.09 K/UL — SIGNIFICANT CHANGE UP (ref 4.8–10.8)

## 2021-11-16 PROCEDURE — 99232 SBSQ HOSP IP/OBS MODERATE 35: CPT

## 2021-11-16 RX ORDER — GABAPENTIN 400 MG/1
1 CAPSULE ORAL
Qty: 0 | Refills: 0 | DISCHARGE

## 2021-11-16 RX ORDER — GABAPENTIN 400 MG/1
300 CAPSULE ORAL DAILY
Refills: 0 | Status: DISCONTINUED | OUTPATIENT
Start: 2021-11-16 | End: 2021-11-26

## 2021-11-16 RX ADMIN — Medication 667 MILLIGRAM(S): at 21:51

## 2021-11-16 RX ADMIN — APIXABAN 5 MILLIGRAM(S): 2.5 TABLET, FILM COATED ORAL at 17:22

## 2021-11-16 RX ADMIN — Medication 1: at 07:55

## 2021-11-16 RX ADMIN — Medication 1 TABLET(S): at 12:15

## 2021-11-16 RX ADMIN — Medication 1: at 12:16

## 2021-11-16 RX ADMIN — INSULIN GLARGINE 21 UNIT(S): 100 INJECTION, SOLUTION SUBCUTANEOUS at 22:24

## 2021-11-16 RX ADMIN — IRON SUCROSE 100 MILLIGRAM(S): 20 INJECTION, SOLUTION INTRAVENOUS at 10:26

## 2021-11-16 RX ADMIN — Medication 1 APPLICATION(S): at 17:22

## 2021-11-16 RX ADMIN — Medication 7 UNIT(S): at 12:17

## 2021-11-16 RX ADMIN — Medication 1: at 16:57

## 2021-11-16 RX ADMIN — Medication 667 MILLIGRAM(S): at 16:58

## 2021-11-16 RX ADMIN — PREGABALIN 1000 MICROGRAM(S): 225 CAPSULE ORAL at 12:16

## 2021-11-16 RX ADMIN — Medication 7 UNIT(S): at 16:58

## 2021-11-16 RX ADMIN — ATORVASTATIN CALCIUM 40 MILLIGRAM(S): 80 TABLET, FILM COATED ORAL at 21:52

## 2021-11-16 RX ADMIN — Medication 7 UNIT(S): at 07:56

## 2021-11-16 RX ADMIN — Medication 667 MILLIGRAM(S): at 08:00

## 2021-11-16 RX ADMIN — GABAPENTIN 300 MILLIGRAM(S): 400 CAPSULE ORAL at 12:15

## 2021-11-16 RX ADMIN — GABAPENTIN 300 MILLIGRAM(S): 400 CAPSULE ORAL at 06:33

## 2021-11-16 RX ADMIN — Medication 1 APPLICATION(S): at 06:35

## 2021-11-16 RX ADMIN — CALCITRIOL 0.5 MICROGRAM(S): 0.5 CAPSULE ORAL at 12:27

## 2021-11-16 RX ADMIN — CLOPIDOGREL BISULFATE 75 MILLIGRAM(S): 75 TABLET, FILM COATED ORAL at 12:15

## 2021-11-16 RX ADMIN — Medication 667 MILLIGRAM(S): at 12:16

## 2021-11-16 RX ADMIN — APIXABAN 5 MILLIGRAM(S): 2.5 TABLET, FILM COATED ORAL at 06:35

## 2021-11-16 NOTE — PROGRESS NOTE ADULT - SUBJECTIVE AND OBJECTIVE BOX
Nephrology progress note    Patient is seen and examined, events over the last 24 h noted .  Seen on HD , denies SOB  Allergies:  No Known Allergies    Hospital Medications:   MEDICATIONS  (STANDING):  apixaban 5 milliGRAM(s) Oral two times a day  atorvastatin 40 milliGRAM(s) Oral at bedtime  BACItracin   Ointment 1 Application(s) Topical two times a day  calcitriol   Capsule 0.5 MICROGram(s) Oral <User Schedule>  calcium acetate 667 milliGRAM(s) Oral four times a day with meals  clopidogrel Tablet 75 milliGRAM(s) Oral daily  cyanocobalamin 1000 MICROGram(s) Oral daily  darbepoetin Injectable Syringe 40 MICROGram(s) SubCutaneous every week  dextrose 40% Gel 15 Gram(s) Oral once  dextrose 5%. 1000 milliLiter(s) (50 mL/Hr) IV Continuous <Continuous>  dextrose 5%. 1000 milliLiter(s) (100 mL/Hr) IV Continuous <Continuous>  dextrose 50% Injectable 25 Gram(s) IV Push once  dextrose 50% Injectable 12.5 Gram(s) IV Push once  dextrose 50% Injectable 25 Gram(s) IV Push once  gabapentin 300 milliGRAM(s) Oral every 12 hours  glucagon  Injectable 1 milliGRAM(s) IntraMuscular once  influenza   Vaccine 0.5 milliLiter(s) IntraMuscular once  insulin glargine Injectable (LANTUS) 21 Unit(s) SubCutaneous at bedtime  insulin lispro (ADMELOG) corrective regimen sliding scale   SubCutaneous three times a day before meals  insulin lispro Injectable (ADMELOG) 7 Unit(s) SubCutaneous three times a day before meals  iron sucrose Injectable 100 milliGRAM(s) IV Push <User Schedule>  multivitamin 1 Tablet(s) Oral daily        VITALS:  T(F): 96.2 (11-16-21 @ 05:06), Max: 97 (11-15-21 @ 22:00)  HR: 92 (11-16-21 @ 05:06)  BP: 148/71 (11-16-21 @ 05:06)  RR: 18 (11-16-21 @ 05:06)  SpO2: --  Wt(kg): --    11-15 @ 07:01  -  11-16 @ 07:00  --------------------------------------------------------  IN: 0 mL / OUT: 350 mL / NET: -350 mL          PHYSICAL EXAM:  Constitutional: NAD  HEENT: anicteric sclera, MMM  Neck: No JVD  Respiratory: CTA  Cardiovascular: S1, S2, RRR  Gastrointestinal: BS+, soft, NT/ND  Extremities: No peripheral edema  Neurological: A/O x 3  : No CVA tenderness. No callahan.   Skin: No rashes  Vascular Access: tesio     LABS:            Urine Studies:      RADIOLOGY & ADDITIONAL STUDIES:

## 2021-11-16 NOTE — DISCHARGE NOTE PROVIDER - NSDCFUADDAPPT_GEN_ALL_CORE_FT
Please follow up with your primary care doctor within 1 week of discharge. Please follow up with your primary care doctor within 1 week of discharge.  Please follow up with hematologist in 2 weeks to discuss need for long term anticoagulation (eliquis) in setting of history of DVT >15 years ago  Follow up with nephrology for hemodialysis

## 2021-11-16 NOTE — DISCHARGE NOTE PROVIDER - HOSPITAL COURSE
Pt is a 55 yo M PMH of DM, CAD s/p stents, ESRD on HD T/Th/Sat. Pt. was brought from nursing home for agitation and refusal of medication and dialysis, although pt. said that was not true. As per pt., he received HD after which he became hypotensive and had episode of nbnb diarrhea. He did not wanted to go to the nursing home, so was admitted for placement.    Hospital course: Pt. was admitted for placement and received multiple hemodialysis. His co-morbidities were managed as below:    # ESRD on HD (TTS).   - calcium acetate 667 milliGRAM(s) Oral four times a day with meals  - iron sucrose Injectable 100 milliGRAM(s) IV Push <User Schedule>  - Renal consult for in-pt HD      - HD (last on 11/13); 3hrs, 2L UF; HD next 11/16    # History of b12 def  - cyanocobalamin 1000 MICROGram(s) Oral daily    # DM neuropathy  - gabapentin 300 milliGRAM(s) Oral every 24 hours    # Diabetes mellitus with hyperglycemia  - A1c: 5.7  - insulin glargine Injectable (LANTUS) 21 Unit(s) SubCutaneous at bedtime  - insulin lispro (ADMELOG) corrective regimen sliding scale   SubCutaneous three times a day before meals  - insulin lispro Injectable (ADMELOG) 7 Unit(s) SubCutaneous three times a day before meals    # Normocytic anemia, at goal for ESRD pt.  -  Pt denies any bleeding symptoms. Replace as necessary.   - venofer as above  - darbepoetin Injectable Syringe 40 MICROGram(s) SubCutaneous every week    # HAGMA (High AG Metabolic Acidosis)  - from uremic acidosis. On HD.    # Hx of CAD s/p Stents.  - atorvastatin 40 milliGRAM(s) Oral at bedtime  - clopidogrel Tablet 75 milliGRAM(s) Oral daily    # H/O DVT:   - per patient he was on coumadin for 15 years, recently switched to Eliquis. unsure why he is on it indefinitely(provoked vs unprovoked PE)- poor historian.  - apixaban 5 milliGRAM(s) Oral two times a day  - will need to follow up with PCP once discharged and continue conversation about ongoing need      Pt is a 55 yo M PMH of DM, CAD s/p stents, ESRD on HD T/Th/Sat. Pt. was brought from nursing home for agitation and refusal of medication and dialysis, although pt. said that was not true. As per pt., he received HD after which he became hypotensive and had episode of nbnb diarrhea. He did not wanted to go to the nursing home, so was admitted for placement. Hospital course: Pt. was admitted for placement and received multiple hemodialysis. His co-morbidities were managed as below:      ESRD on HD (TTS)  - Follow up outpatient for Hemodialysis  - calcium acetate 667 milliGRAM(s) Oral four times a day with meals  - darbepoetin Injectable Syringe 40 MICROGram(s) SubCutaneous every week      History of b12 def  - Continue cyanocobalamin 1000 MICROGram(s) Oral daily      DM neuropathy  Diabetes mellitus with hyperglycemia  - A1c: 5.7  - gabapentin 300 milliGRAM(s) Oral every 24 hours  - Resume home DM medications      Hx of CAD s/p Stents.  - Continue atorvastatin 40 milliGRAM(s) Oral at bedtime  - Continue clopidogrel Tablet 75 milliGRAM(s) Oral daily      History Of DVT:   - per patient he was on coumadin for 15 years, recently switched to Eliquis. unsure why he is on it indefinitely(provoked vs unprovoked PE)- poor historian.  - We continued apixaban 5 milliGRAM(s) Oral two times a day during patient's stay  - Will need to follow up with PCP once discharged and continue conversation about ongoing need      Pt is a 57 yo M PMH of DM, CAD s/p stents, ESRD on HD T/Th/Sat. Pt. was brought from nursing home for agitation and refusal of medication and dialysis, although pt. said that was not true. As per pt., he received HD after which he became hypotensive and had episode of nbnb diarrhea. He did not wanted to go to the nursing home, so was admitted for placement. Hospital course: Pt. was admitted for placement and received multiple hemodialysis. His co-morbidities were managed as below:      # ESRD on HD (TTS)  - Follow up outpatient for Hemodialysis  - calcium acetate 667 milliGRAM(s) Oral four times a day with meals  - darbepoetin Injectable Syringe 40 MICROGram(s) SubCutaneous every week  - now has a new HD [facility] in [facility]    # History of b12 def  - Continue cyanocobalamin 1000 MICROGram(s) Oral daily    # DM neuropathy  Diabetes mellitus with hyperglycemia  - A1c: 5.7  - gabapentin 300 milliGRAM(s) Oral every 24 hours  - Resume home DM medications    # Hx of CAD s/p Stents.  - Continue atorvastatin 40 milliGRAM(s) Oral at bedtime  - Continue clopidogrel Tablet 75 milliGRAM(s) Oral daily    # History Of DVT:   - per patient he was on coumadin for 15 years, recently switched to Eliquis. unsure why he is on it indefinitely(provoked vs unprovoked PE)- poor historian.  - We continued apixaban 5 milliGRAM(s) Oral two times a day during patient's stay  - Will need to follow up with PCP once discharged and continue conversation about ongoing need

## 2021-11-16 NOTE — PROGRESS NOTE ADULT - ASSESSMENT
55 yo M with PMH of DM, CAD s/p Stents, ESRD on HD sat/tu/th presented from [facility] for eval of agitation. As per NH pt became agitated and refused medications and dialysis yesterday. Per patient he had inadequate care at NH and wanted to go home with home care.    # ESRD on HD (TTS).   - calcium acetate 667 milliGRAM(s) Oral four times a day with meals  - iron sucrose Injectable 100 milliGRAM(s) IV Push <User Schedule>  - Renal consult for in-pt HD      - HD (last on 11/13); 3hrs, 2L UF; HD next 11/16      # History of b12 def  - cyanocobalamin 1000 MICROGram(s) Oral daily    # DM neuropathy  - gabapentin 300 milliGRAM(s) Oral every 12 hours      # Diabetes mellitus with hyperglycemia  - A1c: 5.7  - insulin glargine Injectable (LANTUS) 21 Unit(s) SubCutaneous at bedtime  - insulin lispro (ADMELOG) corrective regimen sliding scale   SubCutaneous three times a day before meals  - insulin lispro Injectable (ADMELOG) 7 Unit(s) SubCutaneous three times a day before meals    # Normocytic anemia, at goal for ESRD pt.  -  Pt denies any bleeding symptoms. Replace as necessary.   - venofer as above  - darbepoetin Injectable Syringe 40 MICROGram(s) SubCutaneous every week    # HAGMA (High AG Metabolic Acidosis)  - from uremic acidosis. On HD.    # Hx of CAD s/p Stents.  - atorvastatin 40 milliGRAM(s) Oral at bedtime  - clopidogrel Tablet 75 milliGRAM(s) Oral daily    # H/O DVT:   - per patient he was on coumadin for 15 years, recently switched to Eliquis. unsure why he is on it indefinitely(provoked vs unprovoked PE)- poor historian.  - apixaban 5 milliGRAM(s) Oral two times a day  - will need to follow up with PCP once discharged and continue conversation about ongoing need       #Progress Note Handoff:  Pending (specify): HD today. Auth and placement at new STR facility in .  Family discussion: d/w pt   Disposition: PT to re-eval pt. for amb. with stairs, pending d/c home. Home/HCC/HD. CM to f/u with Nephrology for outside HD from Home, d/c pending.

## 2021-11-16 NOTE — DISCHARGE NOTE PROVIDER - NSDCMRMEDTOKEN_GEN_ALL_CORE_FT
Aranesp 40 mcg/0.4 mL injectable solution: 0.4 milliliter(s) injectable once a week thursday  atorvastatin 40 mg oral tablet: 1 tab(s) orally once a day  calcitriol 0.5 mcg oral capsule: 1 cap(s) orally Tuesday, Thursday, Saturday  cyanocobalamin 100 mcg oral tablet: 1 tab(s) orally once a day  Eliquis 5 mg oral tablet: 1 tab(s) orally 2 times a day  gabapentin 300 mg oral tablet: 1 cap(s) orally every 24 hours  Januvia 25 mg oral tablet: 1 tab(s) orally once a day  Nephrocaps oral capsule: 1 cap(s) orally once a day  Plavix 75 mg oral tablet: 1 tab(s) orally once a day  simethicone 80 mg oral tablet, chewable: 1 tab(s) orally 4 times a day (after meals and at bedtime), As Needed  Venofer 20 mg/mL intravenous solution: 5 milliliter(s) intravenous Tuesday, Thursday, Sunday with HD   amLODIPine 5 mg oral tablet: 1 tab(s) orally once a day   Aranesp 40 mcg/0.4 mL injectable solution: 0.4 milliliter(s) injectable once a week thursday  atorvastatin 40 mg oral tablet: 1 tab(s) orally once a day  calcitriol 0.5 mcg oral capsule: 1 cap(s) orally Tuesday, Thursday, Saturday  calcium acetate 667 mg oral tablet: 1 tab(s) orally once a day   cyanocobalamin 100 mcg oral tablet: 1 tab(s) orally once a day  Eliquis 5 mg oral tablet: 1 tab(s) orally 2 times a day  gabapentin 300 mg oral tablet: 1 cap(s) orally every 24 hours  Januvia 25 mg oral tablet: 1 tab(s) orally once a day  Nephrocaps oral capsule: 1 cap(s) orally once a day  Plavix 75 mg oral tablet: 1 tab(s) orally once a day  simethicone 80 mg oral tablet, chewable: 1 tab(s) orally 4 times a day (after meals and at bedtime), As Needed  Venofer 20 mg/mL intravenous solution: 5 milliliter(s) intravenous Tuesday, Thursday, Sunday with HD   amLODIPine 5 mg oral tablet: 1 tab(s) orally once a day   Aranesp 40 mcg/0.4 mL injectable solution: 0.4 milliliter(s) injectable once a week thursday  atorvastatin 40 mg oral tablet: 1 tab(s) orally once a day  calcitriol 0.5 mcg oral capsule: 1 cap(s) orally Tuesday, Thursday, Saturday  cyanocobalamin 100 mcg oral tablet: 1 tab(s) orally once a day  Eliquis 5 mg oral tablet: 1 tab(s) orally 2 times a day  gabapentin 300 mg oral tablet: 1 cap(s) orally every 24 hours  Januvia 25 mg oral tablet: 1 tab(s) orally once a day  Nephrocaps oral capsule: 1 cap(s) orally once a day  Plavix 75 mg oral tablet: 1 tab(s) orally once a day  simethicone 80 mg oral tablet, chewable: 1 tab(s) orally 4 times a day (after meals and at bedtime), As Needed  Venofer 20 mg/mL intravenous solution: 5 milliliter(s) intravenous Tuesday, Thursday, Sunday with HD

## 2021-11-16 NOTE — DISCHARGE NOTE PROVIDER - NSDCCPCAREPLAN_GEN_ALL_CORE_FT
PRINCIPAL DISCHARGE DIAGNOSIS  Diagnosis: Inadequate social support  Assessment and Plan of Treatment: You were admitted here for placement in a safe place. ___       PRINCIPAL DISCHARGE DIAGNOSIS  Diagnosis: Inadequate social support  Assessment and Plan of Treatment: You were admitted here for placement in a safe place. ___      SECONDARY DISCHARGE DIAGNOSES  Diagnosis: ESRD on dialysis  Assessment and Plan of Treatment: ESRD on HD (TTS)  - Follow up outpatient for Hemodialysis  - calcium acetate 667 milliGRAM(s) Oral four times a day with meals  - darbepoetin Injectable Syringe 40 MICROGram(s) SubCutaneous every week    Diagnosis: Diabetes mellitus  Assessment and Plan of Treatment: DM neuropathy  Diabetes mellitus with hyperglycemia  - A1c: 5.7  - gabapentin 300 milliGRAM(s) Oral every 24 hours  - Resume home DM medications    Diagnosis: CAD (coronary artery disease)  Assessment and Plan of Treatment: Hx of CAD s/p Stents.  - Continue atorvastatin 40 milliGRAM(s) Oral at bedtime  - Continue clopidogrel Tablet 75 milliGRAM(s) Oral daily    Diagnosis: Deep venous thrombosis  Assessment and Plan of Treatment: History Of DVT:   - per patient he was on coumadin for 15 years, recently switched to Eliquis. unsure why he is on it indefinitely(provoked vs unprovoked PE)- poor historian.  - We continued apixaban 5 milliGRAM(s) Oral two times a day during patient's stay  - Will need to follow up with PCP once discharged and continue conversation about ongoing need

## 2021-11-16 NOTE — PROGRESS NOTE ADULT - SUBJECTIVE AND OBJECTIVE BOX
ALEXANDRO MONROE  56y  Male      Patient is a 56y old  Male who presents with a chief complaint of Inadequate social support and placement (16 Nov 2021 09:53)      INTERVAL HPI/OVERNIGHT EVENTS: no acute events overnight. HD session this morning. no complaints this AM.       REVIEW OF SYSTEMS:  CONSTITUTIONAL: No fever, weight loss, or fatigue  RESPIRATORY: No cough, wheezing, chills or hemoptysis; No shortness of breath  CARDIOVASCULAR: No chest pain, palpitations, dizziness, or leg swelling  GASTROINTESTINAL: No abdominal or epigastric pain. No nausea, vomiting, or hematemesis; No diarrhea or constipation. No melena or hematochezia.  GENITOURINARY: No dysuria, frequency, hematuria, or incontinence  NEUROLOGICAL: No headaches, memory loss, loss of strength, numbness, or tremors  SKIN: No itching, burning, rashes, or lesions   MUSCULOSKELETAL: No joint pain or swelling; No muscle, back, or extremity pain  PSYCHIATRIC: No depression, anxiety, mood swings, or difficulty sleeping  All other review of systems negative    VITALS  T(C): 35.7 (11-16-21 @ 05:06), Max: 36.1 (11-15-21 @ 14:32)  HR: 80 (11-16-21 @ 08:45) (80 - 97)  BP: 169/84 (11-16-21 @ 08:45) (148/71 - 174/81)  RR: 18 (11-16-21 @ 08:45) (18 - 18)  SpO2: --  Wt(kg): --Vital Signs Last 24 Hrs  T(C): 35.7 (16 Nov 2021 05:06), Max: 36.1 (15 Nov 2021 14:32)  T(F): 96.2 (16 Nov 2021 05:06), Max: 97 (15 Nov 2021 22:00)  HR: 80 (16 Nov 2021 08:45) (80 - 97)  BP: 169/84 (16 Nov 2021 08:45) (148/71 - 174/81)  BP(mean): --  RR: 18 (16 Nov 2021 08:45) (18 - 18)  SpO2: --      11-15-21 @ 07:01  -  11-16-21 @ 07:00  --------------------------------------------------------  IN: 0 mL / OUT: 350 mL / NET: -350 mL        PHYSICAL EXAM:  GENERAL: elderly M, NAD, non toxic appearing  EYES: anicteric sclera, non injected conjunctiva, EOMI  ENT: hearing grossly intact, oropharynx clear, MMM  PSYCH: no agitation, baseline mentation  NERVOUS SYSTEM:  Alert & Oriented X3, CN 2-12 grossly intact  PULMONARY: Clear to percussion bilaterally; No rales, rhonchi, wheezing, or rubs  CARDIOVASCULAR: Regular rate and rhythm; No murmurs, rubs, or gallops  GI: Soft, Nontender, Nondistended; Bowel sounds present  EXTREMITIES:  2+ Peripheral Pulses, No clubbing, cyanosis, or edema  LYMPH: No lymphadenopathy noted  SKIN: No rashes or lesions    Consultant(s) Notes Reviewed:  [x ] YES  [ ] NO    Discussed with Consultants/Other Providers [ x] YES     LABS                          9.4    7.09  )-----------( 188      ( 16 Nov 2021 10:33 )             29.6       POCT Blood Glucose.: 152 mg/dL (16 Nov 2021 07:35      RADIOLOGY & ADDITIONAL TESTS:  - no images 11/16  Imaging Personally Reviewed:  [ ] YES  [ ] NO    HEALTH ISSUES - PROBLEM Dx:    MEDICATIONS  (STANDING):  apixaban 5 milliGRAM(s) Oral two times a day  atorvastatin 40 milliGRAM(s) Oral at bedtime  BACItracin   Ointment 1 Application(s) Topical two times a day  calcitriol   Capsule 0.5 MICROGram(s) Oral <User Schedule>  calcium acetate 667 milliGRAM(s) Oral four times a day with meals  clopidogrel Tablet 75 milliGRAM(s) Oral daily  cyanocobalamin 1000 MICROGram(s) Oral daily  darbepoetin Injectable Syringe 40 MICROGram(s) SubCutaneous every week  dextrose 40% Gel 15 Gram(s) Oral once  dextrose 5%. 1000 milliLiter(s) (50 mL/Hr) IV Continuous <Continuous>  dextrose 5%. 1000 milliLiter(s) (100 mL/Hr) IV Continuous <Continuous>  dextrose 50% Injectable 25 Gram(s) IV Push once  dextrose 50% Injectable 12.5 Gram(s) IV Push once  dextrose 50% Injectable 25 Gram(s) IV Push once  gabapentin 300 milliGRAM(s) Oral daily  glucagon  Injectable 1 milliGRAM(s) IntraMuscular once  influenza   Vaccine 0.5 milliLiter(s) IntraMuscular once  insulin glargine Injectable (LANTUS) 21 Unit(s) SubCutaneous at bedtime  insulin lispro (ADMELOG) corrective regimen sliding scale   SubCutaneous three times a day before meals  insulin lispro Injectable (ADMELOG) 7 Unit(s) SubCutaneous three times a day before meals  iron sucrose Injectable 100 milliGRAM(s) IV Push <User Schedule>  multivitamin 1 Tablet(s) Oral daily    MEDICATIONS  (PRN):  simethicone 80 milliGRAM(s) Chew four times a day PRN Dyspepsia

## 2021-11-16 NOTE — PROGRESS NOTE ADULT - ASSESSMENT
57 yo M with PMH of DM, CAD s/p Stents, ESRD on HD  at [facility] presented from [facility] for eval of agitation. As per NH pt became agitated and refused medications.  Patient stated that he does not want to go back to [facility] and he wants to do HHD   # ESRD on HD today 3 hrs 2 K bath  UF 2 L as chloe  # MBD  # Normocytic anemia on venofer; on PARISA reduce dose  if Hb>9.5  # HTN  - HD in AM / last HD on Saturday   - last ph level noted, cont pholso 1 tab qac.  Check iPTH/ repeat IP   -decrease Gabapentin to 300 mg qd as per previous notes   - BP controlled from before / follow readings   - pending placement  - will follow

## 2021-11-17 LAB
ALBUMIN SERPL ELPH-MCNC: 3.3 G/DL — LOW (ref 3.5–5.2)
ALP SERPL-CCNC: 170 U/L — HIGH (ref 30–115)
ALT FLD-CCNC: 31 U/L — SIGNIFICANT CHANGE UP (ref 0–41)
ANION GAP SERPL CALC-SCNC: 19 MMOL/L — HIGH (ref 7–14)
AST SERPL-CCNC: 25 U/L — SIGNIFICANT CHANGE UP (ref 0–41)
BASOPHILS # BLD AUTO: 0.04 K/UL — SIGNIFICANT CHANGE UP (ref 0–0.2)
BASOPHILS NFR BLD AUTO: 0.6 % — SIGNIFICANT CHANGE UP (ref 0–1)
BILIRUB SERPL-MCNC: <0.2 MG/DL — SIGNIFICANT CHANGE UP (ref 0.2–1.2)
BUN SERPL-MCNC: 66 MG/DL — CRITICAL HIGH (ref 10–20)
CALCIUM SERPL-MCNC: 8 MG/DL — LOW (ref 8.5–10.1)
CALCIUM SERPL-MCNC: 8.1 MG/DL — LOW (ref 8.4–10.5)
CHLORIDE SERPL-SCNC: 103 MMOL/L — SIGNIFICANT CHANGE UP (ref 98–110)
CO2 SERPL-SCNC: 19 MMOL/L — SIGNIFICANT CHANGE UP (ref 17–32)
CREAT SERPL-MCNC: 5 MG/DL — CRITICAL HIGH (ref 0.7–1.5)
EOSINOPHIL # BLD AUTO: 0.42 K/UL — SIGNIFICANT CHANGE UP (ref 0–0.7)
EOSINOPHIL NFR BLD AUTO: 6.5 % — SIGNIFICANT CHANGE UP (ref 0–8)
GLUCOSE BLDC GLUCOMTR-MCNC: 112 MG/DL — HIGH (ref 70–99)
GLUCOSE BLDC GLUCOMTR-MCNC: 142 MG/DL — HIGH (ref 70–99)
GLUCOSE BLDC GLUCOMTR-MCNC: 147 MG/DL — HIGH (ref 70–99)
GLUCOSE BLDC GLUCOMTR-MCNC: 173 MG/DL — HIGH (ref 70–99)
GLUCOSE SERPL-MCNC: 136 MG/DL — HIGH (ref 70–99)
HCT VFR BLD CALC: 33 % — LOW (ref 42–52)
HGB BLD-MCNC: 10.3 G/DL — LOW (ref 14–18)
IMM GRANULOCYTES NFR BLD AUTO: 0.2 % — SIGNIFICANT CHANGE UP (ref 0.1–0.3)
LYMPHOCYTES # BLD AUTO: 1.3 K/UL — SIGNIFICANT CHANGE UP (ref 1.2–3.4)
LYMPHOCYTES # BLD AUTO: 20.2 % — LOW (ref 20.5–51.1)
MAGNESIUM SERPL-MCNC: 1.7 MG/DL — LOW (ref 1.8–2.4)
MCHC RBC-ENTMCNC: 27.6 PG — SIGNIFICANT CHANGE UP (ref 27–31)
MCHC RBC-ENTMCNC: 31.2 G/DL — LOW (ref 32–37)
MCV RBC AUTO: 88.5 FL — SIGNIFICANT CHANGE UP (ref 80–94)
MONOCYTES # BLD AUTO: 0.56 K/UL — SIGNIFICANT CHANGE UP (ref 0.1–0.6)
MONOCYTES NFR BLD AUTO: 8.7 % — SIGNIFICANT CHANGE UP (ref 1.7–9.3)
NEUTROPHILS # BLD AUTO: 4.11 K/UL — SIGNIFICANT CHANGE UP (ref 1.4–6.5)
NEUTROPHILS NFR BLD AUTO: 63.8 % — SIGNIFICANT CHANGE UP (ref 42.2–75.2)
NRBC # BLD: 0 /100 WBCS — SIGNIFICANT CHANGE UP (ref 0–0)
PLATELET # BLD AUTO: 181 K/UL — SIGNIFICANT CHANGE UP (ref 130–400)
POTASSIUM SERPL-MCNC: 5.2 MMOL/L — HIGH (ref 3.5–5)
POTASSIUM SERPL-SCNC: 5.2 MMOL/L — HIGH (ref 3.5–5)
PROT SERPL-MCNC: 6.1 G/DL — SIGNIFICANT CHANGE UP (ref 6–8)
PTH-INTACT FLD-MCNC: 512 PG/ML — HIGH (ref 15–65)
RBC # BLD: 3.73 M/UL — LOW (ref 4.7–6.1)
RBC # FLD: 13.4 % — SIGNIFICANT CHANGE UP (ref 11.5–14.5)
SODIUM SERPL-SCNC: 141 MMOL/L — SIGNIFICANT CHANGE UP (ref 135–146)
WBC # BLD: 6.44 K/UL — SIGNIFICANT CHANGE UP (ref 4.8–10.8)
WBC # FLD AUTO: 6.44 K/UL — SIGNIFICANT CHANGE UP (ref 4.8–10.8)

## 2021-11-17 PROCEDURE — 99232 SBSQ HOSP IP/OBS MODERATE 35: CPT

## 2021-11-17 RX ORDER — CALCIUM ACETATE 667 MG
1334 TABLET ORAL
Refills: 0 | Status: DISCONTINUED | OUTPATIENT
Start: 2021-11-17 | End: 2021-11-24

## 2021-11-17 RX ORDER — AMLODIPINE BESYLATE 2.5 MG/1
5 TABLET ORAL DAILY
Refills: 0 | Status: DISCONTINUED | OUTPATIENT
Start: 2021-11-17 | End: 2021-11-26

## 2021-11-17 RX ADMIN — APIXABAN 5 MILLIGRAM(S): 2.5 TABLET, FILM COATED ORAL at 18:42

## 2021-11-17 RX ADMIN — ATORVASTATIN CALCIUM 40 MILLIGRAM(S): 80 TABLET, FILM COATED ORAL at 21:35

## 2021-11-17 RX ADMIN — INSULIN GLARGINE 21 UNIT(S): 100 INJECTION, SOLUTION SUBCUTANEOUS at 21:35

## 2021-11-17 RX ADMIN — Medication 1 APPLICATION(S): at 05:43

## 2021-11-17 RX ADMIN — Medication 667 MILLIGRAM(S): at 16:55

## 2021-11-17 RX ADMIN — Medication 7 UNIT(S): at 07:53

## 2021-11-17 RX ADMIN — Medication 667 MILLIGRAM(S): at 11:39

## 2021-11-17 RX ADMIN — GABAPENTIN 300 MILLIGRAM(S): 400 CAPSULE ORAL at 11:39

## 2021-11-17 RX ADMIN — APIXABAN 5 MILLIGRAM(S): 2.5 TABLET, FILM COATED ORAL at 05:41

## 2021-11-17 RX ADMIN — Medication 1 APPLICATION(S): at 18:42

## 2021-11-17 RX ADMIN — PREGABALIN 1000 MICROGRAM(S): 225 CAPSULE ORAL at 11:40

## 2021-11-17 RX ADMIN — CLOPIDOGREL BISULFATE 75 MILLIGRAM(S): 75 TABLET, FILM COATED ORAL at 11:40

## 2021-11-17 RX ADMIN — Medication 1 TABLET(S): at 11:39

## 2021-11-17 RX ADMIN — Medication 7 UNIT(S): at 16:55

## 2021-11-17 RX ADMIN — Medication 667 MILLIGRAM(S): at 07:48

## 2021-11-17 RX ADMIN — Medication 1334 MILLIGRAM(S): at 21:35

## 2021-11-17 RX ADMIN — Medication 7 UNIT(S): at 11:37

## 2021-11-17 NOTE — PROGRESS NOTE ADULT - SUBJECTIVE AND OBJECTIVE BOX
[facility]  56[facility]  Allergy: No Known Allergies    Hospital Day: 12d    Patient seen and examined earlier today. Feeling well, no complaints.     PMH/PSH:  PAST MEDICAL & SURGICAL HISTORY:  CAD (coronary artery disease)    DVT (deep venous thrombosis)    ESRD on dialysis    DM (diabetes mellitus)    H/O varicose vein stripping        LAST 24-Hr EVENTS:    VITALS:  T(F): 97.6 (11-17-21 @ 19:15), Max: 97.9 (11-16-21 @ 19:32)  HR: 98 (11-17-21 @ 19:15)  BP: 156/73 (11-17-21 @ 19:15) (145/77 - 156/77)  RR: 18 (11-17-21 @ 19:15)  SpO2: --        TESTS & MEASUREMENTS:  Weight (Kg):       11-15-21 @ 07:01  -  11-16-21 @ 07:00  --------------------------------------------------------  IN: 0 mL / OUT: 350 mL / NET: -350 mL    11-16-21 @ 07:01  -  11-17-21 @ 07:00  --------------------------------------------------------  IN: 720 mL / OUT: 3550 mL / NET: -2830 mL                            10.3   6.44  )-----------( 181      ( 17 Nov 2021 07:36 )             33.0       11-17    141  |  103  |  66<HH>  ----------------------------<  136<H>  5.2<H>   |  19  |  5.0<HH>    Ca    8.0<L>      17 Nov 2021 07:36  Phos  5.8     11-16  Mg     1.7     11-17    TPro  6.1  /  Alb  3.3<L>  /  TBili  <0.2  /  DBili  x   /  AST  25  /  ALT  31  /  AlkPhos  170<H>  11-17    LIVER FUNCTIONS - ( 17 Nov 2021 07:36 )  Alb: 3.3 g/dL / Pro: 6.1 g/dL / ALK PHOS: 170 U/L / ALT: 31 U/L / AST: 25 U/L / GGT: x                               RADIOLOGY, ECG, & ADDITIONAL TESTS:      RECENT DIAGNOSTIC ORDERS:  COVID-19 PCR: AM Sched. Collection: 18-Nov-2021 04:30  Specimen Source: Nasopharyngeal (11-17-21 @ 14:56)      MEDICATIONS:  MEDICATIONS  (STANDING):  amLODIPine   Tablet 5 milliGRAM(s) Oral daily  apixaban 5 milliGRAM(s) Oral two times a day  atorvastatin 40 milliGRAM(s) Oral at bedtime  BACItracin   Ointment 1 Application(s) Topical two times a day  calcitriol   Capsule 0.5 MICROGram(s) Oral <User Schedule>  calcium acetate 1334 milliGRAM(s) Oral four times a day with meals  clopidogrel Tablet 75 milliGRAM(s) Oral daily  cyanocobalamin 1000 MICROGram(s) Oral daily  dextrose 40% Gel 15 Gram(s) Oral once  dextrose 5%. 1000 milliLiter(s) (50 mL/Hr) IV Continuous <Continuous>  dextrose 5%. 1000 milliLiter(s) (100 mL/Hr) IV Continuous <Continuous>  dextrose 50% Injectable 25 Gram(s) IV Push once  dextrose 50% Injectable 12.5 Gram(s) IV Push once  dextrose 50% Injectable 25 Gram(s) IV Push once  gabapentin 300 milliGRAM(s) Oral daily  glucagon  Injectable 1 milliGRAM(s) IntraMuscular once  influenza   Vaccine 0.5 milliLiter(s) IntraMuscular once  insulin glargine Injectable (LANTUS) 21 Unit(s) SubCutaneous at bedtime  insulin lispro (ADMELOG) corrective regimen sliding scale   SubCutaneous three times a day before meals  insulin lispro Injectable (ADMELOG) 7 Unit(s) SubCutaneous three times a day before meals  iron sucrose Injectable 100 milliGRAM(s) IV Push <User Schedule>  multivitamin 1 Tablet(s) Oral daily    MEDICATIONS  (PRN):  simethicone 80 milliGRAM(s) Chew four times a day PRN Dyspepsia      HOME MEDICATIONS:  Aranesp 40 mcg/0.4 mL injectable solution (11-05)  atorvastatin 40 mg oral tablet (11-05)  calcitriol 0.5 mcg oral capsule (11-05)  cyanocobalamin 100 mcg oral tablet (11-05)  Eliquis 5 mg oral tablet (11-05)  gabapentin 300 mg oral tablet (11-16)  Januvia 25 mg oral tablet (11-05)  Nephrocaps oral capsule (11-05)  Plavix 75 mg oral tablet (11-05)  simethicone 80 mg oral tablet, chewable (11-05)  Venofer 20 mg/mL intravenous solution (11-05)      PHYSICAL EXAM:  GENERAL: elderly M, NAD, non toxic appearing  EYES: anicteric sclera, non injected conjunctiva, EOMI  ENT: hearing grossly intact, oropharynx clear, MMM  PSYCH: no agitation, baseline mentation  NERVOUS SYSTEM:  Alert & Oriented X3, CN 2-12 grossly intact  PULMONARY: Clear to auscultation bilaterally; No rales, rhonchi, wheezing, or rubs  CARDIOVASCULAR: Regular rate and rhythm; No murmurs, rubs, or gallops  GI: Soft, Nontender, Nondistended; Bowel sounds present  EXTREMITIES:  2+ Peripheral Pulses, No clubbing, cyanosis, or edema  LYMPH: No lymphadenopathy noted  SKIN: No rashes or lesions

## 2021-11-17 NOTE — PROGRESS NOTE ADULT - SUBJECTIVE AND OBJECTIVE BOX
Nephrology Progress Note    ALEXANDRO MONROE  MRN-[id number]  56y  Male    S:  Patient is seen and examined, events over the last 24h noted.    O:  Allergies:  No Known Allergies    Hospital Medications:   MEDICATIONS  (STANDING):  apixaban 5 milliGRAM(s) Oral two times a day  atorvastatin 40 milliGRAM(s) Oral at bedtime  BACItracin   Ointment 1 Application(s) Topical two times a day  calcitriol   Capsule 0.5 MICROGram(s) Oral <User Schedule>  calcium acetate 667 milliGRAM(s) Oral four times a day with meals  clopidogrel Tablet 75 milliGRAM(s) Oral daily  cyanocobalamin 1000 MICROGram(s) Oral daily  darbepoetin Injectable Syringe 40 MICROGram(s) SubCutaneous every week  dextrose 40% Gel 15 Gram(s) Oral once  dextrose 5%. 1000 milliLiter(s) (50 mL/Hr) IV Continuous <Continuous>  dextrose 5%. 1000 milliLiter(s) (100 mL/Hr) IV Continuous <Continuous>  dextrose 50% Injectable 25 Gram(s) IV Push once  dextrose 50% Injectable 12.5 Gram(s) IV Push once  dextrose 50% Injectable 25 Gram(s) IV Push once  gabapentin 300 milliGRAM(s) Oral daily  glucagon  Injectable 1 milliGRAM(s) IntraMuscular once  influenza   Vaccine 0.5 milliLiter(s) IntraMuscular once  insulin glargine Injectable (LANTUS) 21 Unit(s) SubCutaneous at bedtime  insulin lispro (ADMELOG) corrective regimen sliding scale   SubCutaneous three times a day before meals  insulin lispro Injectable (ADMELOG) 7 Unit(s) SubCutaneous three times a day before meals  iron sucrose Injectable 100 milliGRAM(s) IV Push <User Schedule>  multivitamin 1 Tablet(s) Oral daily    MEDICATIONS  (PRN):  simethicone 80 milliGRAM(s) Chew four times a day PRN Dyspepsia    Home Medications:  Aranesp 40 mcg/0.4 mL injectable solution: 0.4 milliliter(s) injectable once a week thursday (05 Nov 2021 21:40)  atorvastatin 40 mg oral tablet: 1 tab(s) orally once a day (05 Nov 2021 21:42)  calcitriol 0.5 mcg oral capsule: 1 cap(s) orally Tuesday, Thursday, Saturday (05 Nov 2021 21:42)  cyanocobalamin 100 mcg oral tablet: 1 tab(s) orally once a day (05 Nov 2021 21:42)  Eliquis 5 mg oral tablet: 1 tab(s) orally 2 times a day (05 Nov 2021 21:41)  gabapentin 300 mg oral tablet: 1 cap(s) orally every 24 hours (16 Nov 2021 10:04)  Januvia 25 mg oral tablet: 1 tab(s) orally once a day (05 Nov 2021 21:41)  Nephrocaps oral capsule: 1 cap(s) orally once a day (05 Nov 2021 21:41)  Plavix 75 mg oral tablet: 1 tab(s) orally once a day (05 Nov 2021 21:42)  simethicone 80 mg oral tablet, chewable: 1 tab(s) orally 4 times a day (after meals and at bedtime), As Needed (05 Nov 2021 21:41)  Venofer 20 mg/mL intravenous solution: 5 milliliter(s) intravenous Tuesday, Thursday, Sunday with HD (05 Nov 2021 21:38)      VITALS:  Daily     Daily   T(F): 97.1 (11-17-21 @ 13:30), Max: 97.9 (11-16-21 @ 19:32)  HR: 92 (11-17-21 @ 13:30)  BP: 152/82 (11-17-21 @ 13:30)  RR: 18 (11-17-21 @ 13:30)  SpO2: --  Wt(kg): --  I&O's Detail    16 Nov 2021 07:01  -  17 Nov 2021 07:00  --------------------------------------------------------  IN:    Oral Fluid: 720 mL  Total IN: 720 mL    OUT:    Other (mL): 3000 mL    Voided (mL): 550 mL  Total OUT: 3550 mL    Total NET: -2830 mL        I&O's Summary    16 Nov 2021 07:01  -  17 Nov 2021 07:00  --------------------------------------------------------  IN: 720 mL / OUT: 3550 mL / NET: -2830 mL          PHYSICAL EXAM:  Gen: NAD  Resp: CTAB  Card: S1/S2  Abd: soft  Extremities: no edema   Vascular access: TDC      LABS:      11-17    141  |  103  |  66<HH>  ----------------------------<  136<H>  5.2<H>   |  19  |  5.0<HH>    Ca    8.0<L>      17 Nov 2021 07:36  Phos  5.8     11-16  Mg     1.7     11-17    TPro  6.1  /  Alb  3.3<L>  /  TBili  <0.2  /  DBili      /  AST  25  /  ALT  31  /  AlkPhos  170<H>  11-17    eGFR if Non African American: 12 mL/min/1.73M2 (11-17-21 @ 07:36)  eGFR if : 14 mL/min/1.73M2 (11-17-21 @ 07:36)    Phosphorus Level, Serum: 5.8 mg/dL (11-16-21 @ 10:33)  Phosphorus Level, Serum: 5.2 mg/dL (11-14-21 @ 06:17)    Intact PTH: 512 pg/mL (11-16-21 @ 10:33)                          10.3   6.44  )-----------( 181      ( 17 Nov 2021 07:36 )             33.0     Mean Cell Volume: 88.5 fL (11-17-21 @ 07:36)      Creatinine trend:  Creatinine, Serum: 5.0 mg/dL (11-17-21 @ 07:36)  Creatinine, Serum: 6.3 mg/dL (11-16-21 @ 10:33)  Creatinine, Serum: 4.7 mg/dL (11-14-21 @ 06:17)  Creatinine, Serum: 4.2 mg/dL (11-13-21 @ 18:10)  Creatinine, Serum: 5.2 mg/dL (11-12-21 @ 07:11)

## 2021-11-17 NOTE — PROGRESS NOTE ADULT - ASSESSMENT
55 yo M with PMH of DM, CAD s/p Stents, ESRD on HD sat/tu/th presented from [facility] for eval of agitation. As per NH pt became agitated and refused medications and dialysis yesterday. Per patient he had inadequate care at NH and wanted to go home with home care.    # ESRD on HD (TTS).   - calcium acetate 2 tab qac per nephro   - venofer post HD   - Renal consult for in-pt HD    # History of b12 def  - cyanocobalamin 1000 MICROGram(s) Oral daily    # DM neuropathy  - gabapentin 300 milliGRAM(s) Oral every 12 hours    # Diabetes mellitus with hyperglycemia  - A1c: 5.7  - insulin glargine Injectable (LANTUS) 21 Unit(s) SubCutaneous at bedtime  - insulin lispro (ADMELOG) corrective regimen sliding scale   SubCutaneous three times a day before meals  - insulin lispro Injectable (ADMELOG) 7 Unit(s) SubCutaneous three times a day before meals    # Normocytic anemia, at goal for ESRD pt.  -  Pt denies any bleeding symptoms. Replace as necessary.   - venofer as above  - dc EPO per Nephro     # HAGMA (High AG Metabolic Acidosis)  - from uremic acidosis. On HD.    # Hx of CAD s/p Stents.  - atorvastatin 40 milliGRAM(s) Oral at bedtime  - clopidogrel Tablet 75 milliGRAM(s) Oral daily    # H/O DVT:   - per patient he was on coumadin for 15 years, recently switched to Eliquis. unsure why he is on it indefinitely(provoked vs unprovoked PE)- poor historian.  - apixaban 5 milliGRAM(s) Oral two times a day  - will need to follow up with PCP once discharged and continue conversation about ongoing need       #Progress Note Handoff:  Pending (specify): Pend STR   Family discussion: d/w pt       Jessie Narvaez DO

## 2021-11-17 NOTE — PROGRESS NOTE ADULT - ASSESSMENT
57 yo M with PMH of DM, CAD s/p Stents, ESRD on HD  at [facility] presented from [facility] for eval of agitation. As per NH pt became agitated and refused medications.  Patient stated that he does not want to go back to [facility] and he wants to do HHD     # ESRD on HD TTS  # MBD  # Normocytic anemia   # HTN    - next HD tomorrow  - on venofer, hgb noted, hold PARISA  - last ph level noted, increase phoslo to 2 tabs qac  - add amlodipine 5mg po daily for better bp control  - dose meds for intermittent HD  - pt needs outpatient HD spot; can set up at [address], [phone]

## 2021-11-18 LAB
GLUCOSE BLDC GLUCOMTR-MCNC: 120 MG/DL — HIGH (ref 70–99)
GLUCOSE BLDC GLUCOMTR-MCNC: 126 MG/DL — HIGH (ref 70–99)
GLUCOSE BLDC GLUCOMTR-MCNC: 144 MG/DL — HIGH (ref 70–99)
GLUCOSE BLDC GLUCOMTR-MCNC: 146 MG/DL — HIGH (ref 70–99)

## 2021-11-18 PROCEDURE — 99232 SBSQ HOSP IP/OBS MODERATE 35: CPT

## 2021-11-18 RX ADMIN — ATORVASTATIN CALCIUM 40 MILLIGRAM(S): 80 TABLET, FILM COATED ORAL at 20:47

## 2021-11-18 RX ADMIN — Medication 7 UNIT(S): at 12:54

## 2021-11-18 RX ADMIN — APIXABAN 5 MILLIGRAM(S): 2.5 TABLET, FILM COATED ORAL at 17:28

## 2021-11-18 RX ADMIN — CALCITRIOL 0.5 MICROGRAM(S): 0.5 CAPSULE ORAL at 12:54

## 2021-11-18 RX ADMIN — INSULIN GLARGINE 21 UNIT(S): 100 INJECTION, SOLUTION SUBCUTANEOUS at 21:16

## 2021-11-18 RX ADMIN — Medication 1 APPLICATION(S): at 05:34

## 2021-11-18 RX ADMIN — IRON SUCROSE 100 MILLIGRAM(S): 20 INJECTION, SOLUTION INTRAVENOUS at 10:11

## 2021-11-18 RX ADMIN — Medication 1334 MILLIGRAM(S): at 20:47

## 2021-11-18 RX ADMIN — Medication 1 TABLET(S): at 12:56

## 2021-11-18 RX ADMIN — AMLODIPINE BESYLATE 5 MILLIGRAM(S): 2.5 TABLET ORAL at 05:34

## 2021-11-18 RX ADMIN — Medication 1334 MILLIGRAM(S): at 12:55

## 2021-11-18 RX ADMIN — Medication 7 UNIT(S): at 17:00

## 2021-11-18 RX ADMIN — Medication 1 APPLICATION(S): at 17:28

## 2021-11-18 RX ADMIN — Medication 7 UNIT(S): at 07:59

## 2021-11-18 RX ADMIN — APIXABAN 5 MILLIGRAM(S): 2.5 TABLET, FILM COATED ORAL at 05:34

## 2021-11-18 RX ADMIN — Medication 1334 MILLIGRAM(S): at 17:28

## 2021-11-18 RX ADMIN — Medication 1334 MILLIGRAM(S): at 08:04

## 2021-11-18 RX ADMIN — CLOPIDOGREL BISULFATE 75 MILLIGRAM(S): 75 TABLET, FILM COATED ORAL at 12:55

## 2021-11-18 RX ADMIN — GABAPENTIN 300 MILLIGRAM(S): 400 CAPSULE ORAL at 12:56

## 2021-11-18 RX ADMIN — PREGABALIN 1000 MICROGRAM(S): 225 CAPSULE ORAL at 12:54

## 2021-11-18 NOTE — PROGRESS NOTE ADULT - SUBJECTIVE AND OBJECTIVE BOX
ALEXANDRO MONROE  56y, Male  MRN: [id number]    HOSPITAL DAY: 13 Days     CHIEF COMPLAINT:  Patient is a 56y old  Male who presents with a chief complaint of Inadequate social support (18 Nov 2021 07:08)    INTERVAL HPI/OVERNIGHT EVENTS:  Patient seen and examined at bedside. No acute overnight events occurred. Patient received Hemodialysis this am.     ROS:   All other systems are negative.    PAST MEDICAL & SURGICAL HISTORY:   PAST MEDICAL & SURGICAL HISTORY:  CAD (coronary artery disease)  DVT (deep venous thrombosis)  ESRD on dialysis  DM (diabetes mellitus)  H/O varicose vein stripping    ALLERGIES:   No Known Allergies    SOCIAL HISTORY:   Social History:  Substance Use (street drugs): ( x ) never used  (  ) other:  Tobacco Usage:  ( ) never smoked   (  X ) former smoker - 30 pack years   (   ) current smoker  (     ) pack year  Alcohol Usage: None (05 Nov 2021 22:03)    VITAL SIGNS:  T(F): 97.5 (11-18-21 @ 12:23), Max: 97.6 (11-17-21 @ 19:15)  HR: 88 (11-18-21 @ 12:23) (80 - 98)  BP: 143/72 (11-18-21 @ 12:23) (117/65 - 176/85)  RR: 18 (11-18-21 @ 12:23) (18 - 18)  SpO2: 100% (11-18-21 @ 05:18) (100% - 100%)    I&O's Summary    18 Nov 2021 07:01  -  18 Nov 2021 12:58  --------------------------------------------------------  IN: 0 mL / OUT: 3200 mL / NET: -3200 mL    CAPILLARY BLOOD GLUCOSE  POCT Blood Glucose.: 146 mg/dL (18 Nov 2021 12:04)  POCT Blood Glucose.: 120 mg/dL (18 Nov 2021 07:43)  POCT Blood Glucose.: 173 mg/dL (17 Nov 2021 21:27)  POCT Blood Glucose.: 112 mg/dL (17 Nov 2021 16:30)    PHYSICAL EXAM:  GENERAL:  NAD, No agitation   SKIN: No rashes or lesions  HEENT: Atraumatic. Normocephalic.  NECK:  No JVD.   PULMONARY: Clear to ausculation bilaterally.  CVS: Normal S1, S2.  ABDOMEN/GI: Soft, Nontender, Nondistended  EXTREMITIES:  No edema B/L LE.  NEUROLOGIC:  No motor deficit.  PSYCH: Alert & oriented x 3, normal affect  VASCULAR: Tessio in place    Consultant(s) Notes Reviewed:  [x ] YES  [ ] NO  Care Discussed with Consultants/Other Providers [ x] YES  [ ] NO    LABS:                        10.3   6.44  )-----------( 181      ( 17 Nov 2021 07:36 )             33.0     11-17    141  |  103  |  66<HH>  ----------------------------<  136<H>  5.2<H>   |  19  |  5.0<HH>    Ca    8.0<L>      17 Nov 2021 07:36  Mg     1.7     11-17    TPro  6.1  /  Alb  3.3<L>  /  TBili  <0.2  /  DBili  x   /  AST  25  /  ALT  31  /  AlkPhos  170<H>  11-17      RADIOLOGY & ADDITIONAL TESTS:  Imaging or report Personally Reviewed:  [ ] YES  [ ] NO    EKG reviewed independently    Medications:  Standing:  amLODIPine   Tablet 5 milliGRAM(s) Oral daily  apixaban 5 milliGRAM(s) Oral two times a day  atorvastatin 40 milliGRAM(s) Oral at bedtime  BACItracin   Ointment 1 Application(s) Topical two times a day  calcitriol   Capsule 0.5 MICROGram(s) Oral <User Schedule>  calcium acetate 1334 milliGRAM(s) Oral four times a day with meals  clopidogrel Tablet 75 milliGRAM(s) Oral daily  cyanocobalamin 1000 MICROGram(s) Oral daily  dextrose 40% Gel 15 Gram(s) Oral once  dextrose 5%. 1000 milliLiter(s) IV Continuous <Continuous>  dextrose 5%. 1000 milliLiter(s) IV Continuous <Continuous>  dextrose 50% Injectable 25 Gram(s) IV Push once  dextrose 50% Injectable 12.5 Gram(s) IV Push once  dextrose 50% Injectable 25 Gram(s) IV Push once  gabapentin 300 milliGRAM(s) Oral daily  glucagon  Injectable 1 milliGRAM(s) IntraMuscular once  influenza   Vaccine 0.5 milliLiter(s) IntraMuscular once  insulin glargine Injectable (LANTUS) 21 Unit(s) SubCutaneous at bedtime  insulin lispro (ADMELOG) corrective regimen sliding scale   SubCutaneous three times a day before meals  insulin lispro Injectable (ADMELOG) 7 Unit(s) SubCutaneous three times a day before meals  iron sucrose Injectable 100 milliGRAM(s) IV Push <User Schedule>  multivitamin 1 Tablet(s) Oral daily    PRN Meds:  simethicone 80 milliGRAM(s) Chew four times a day PRN    Home Medications:  Aranesp 40 mcg/0.4 mL injectable solution: 0.4 milliliter(s) injectable once a week thursday (05 Nov 2021 21:40)  atorvastatin 40 mg oral tablet: 1 tab(s) orally once a day (05 Nov 2021 21:42)  calcitriol 0.5 mcg oral capsule: 1 cap(s) orally Tuesday, Thursday, Saturday (05 Nov 2021 21:42)  cyanocobalamin 100 mcg oral tablet: 1 tab(s) orally once a day (05 Nov 2021 21:42)  Eliquis 5 mg oral tablet: 1 tab(s) orally 2 times a day (05 Nov 2021 21:41)  gabapentin 300 mg oral tablet: 1 cap(s) orally every 24 hours (16 Nov 2021 10:04)  Januvia 25 mg oral tablet: 1 tab(s) orally once a day (05 Nov 2021 21:41)  Nephrocaps oral capsule: 1 cap(s) orally once a day (05 Nov 2021 21:41)  Plavix 75 mg oral tablet: 1 tab(s) orally once a day (05 Nov 2021 21:42)  simethicone 80 mg oral tablet, chewable: 1 tab(s) orally 4 times a day (after meals and at bedtime), As Needed (05 Nov 2021 21:41)  Venofer 20 mg/mL intravenous solution: 5 milliliter(s) intravenous Tuesday, Thursday, Sunday with HD (05 Nov 2021 21:38)       ALEXANDRO MONROE  56y, Male  MRN: [id number]    HOSPITAL DAY: 13 Days     CHIEF COMPLAINT:  Patient is a 56y old  Male who presents with a chief complaint of Inadequate social support (18 Nov 2021 07:08)    INTERVAL HPI/OVERNIGHT EVENTS:  Patient seen and examined at bedside. No acute overnight events occurred. Patient received Hemodialysis this am.     ROS:   All other systems are negative.    PAST MEDICAL & SURGICAL HISTORY:   PAST MEDICAL & SURGICAL HISTORY:  CAD (coronary artery disease)  DVT (deep venous thrombosis)  ESRD on dialysis  DM (diabetes mellitus)  H/O varicose vein stripping    ALLERGIES:   No Known Allergies    SOCIAL HISTORY:   Social History:  Substance Use (street drugs): ( x ) never used  (  ) other:  Tobacco Usage:  ( ) never smoked   (  X ) former smoker - 30 pack years   (   ) current smoker  (     ) pack year  Alcohol Usage: None (05 Nov 2021 22:03)    VITAL SIGNS:  T(F): 97.5 (11-18-21 @ 12:23), Max: 97.6 (11-17-21 @ 19:15)  HR: 88 (11-18-21 @ 12:23) (80 - 98)  BP: 143/72 (11-18-21 @ 12:23) (117/65 - 176/85)  RR: 18 (11-18-21 @ 12:23) (18 - 18)  SpO2: 100% (11-18-21 @ 05:18) (100% - 100%)    I&O's Summary    18 Nov 2021 07:01  -  18 Nov 2021 12:58  --------------------------------------------------------  IN: 0 mL / OUT: 3200 mL / NET: -3200 mL    CAPILLARY BLOOD GLUCOSE  POCT Blood Glucose.: 146 mg/dL (18 Nov 2021 12:04)  POCT Blood Glucose.: 120 mg/dL (18 Nov 2021 07:43)  POCT Blood Glucose.: 173 mg/dL (17 Nov 2021 21:27)  POCT Blood Glucose.: 112 mg/dL (17 Nov 2021 16:30)    PHYSICAL EXAM:  GENERAL:  NAD, No agitation   SKIN: No rashes or lesions  HEENT: Atraumatic. Normocephalic.  NECK:  No JVD.   PULMONARY: Clear to ausculation bilaterally.  CVS: Normal S1, S2.  ABDOMEN/GI: Soft, Nontender, Nondistended  EXTREMITIES:  No edema B/L LE.  NEUROLOGIC:  No motor deficit.  PSYCH: Alert & oriented x 3, normal affect  VASCULAR: Tessio in place    Consultant(s) Notes Reviewed:  [x ] YES  [ ] NO  Care Discussed with Consultants/Other Providers [ x] YES  [ ] NO    LABS:                        10.3   6.44  )-----------( 181      ( 17 Nov 2021 07:36 )             33.0     11-17    141  |  103  |  66<HH>  ----------------------------<  136<H>  5.2<H>   |  19  |  5.0<HH>    Ca    8.0<L>      17 Nov 2021 07:36  Mg     1.7     11-17    TPro  6.1  /  Alb  3.3<L>  /  TBili  <0.2  /  DBili  x   /  AST  25  /  ALT  31  /  AlkPhos  170<H>  11-17      RADIOLOGY & ADDITIONAL TESTS:  Imaging or report Personally Reviewed:  [ ] YES  [ ] NO    EKG reviewed independently    Medications:  Standing:  amLODIPine   Tablet 5 milliGRAM(s) Oral daily  apixaban 5 milliGRAM(s) Oral two times a day  atorvastatin 40 milliGRAM(s) Oral at bedtime  BACItracin   Ointment 1 Application(s) Topical two times a day  calcitriol   Capsule 0.5 MICROGram(s) Oral <User Schedule>  calcium acetate 1334 milliGRAM(s) Oral four times a day with meals  clopidogrel Tablet 75 milliGRAM(s) Oral daily  cyanocobalamin 1000 MICROGram(s) Oral daily  dextrose 40% Gel 15 Gram(s) Oral once  dextrose 5%. 1000 milliLiter(s) IV Continuous <Continuous>  dextrose 5%. 1000 milliLiter(s) IV Continuous <Continuous>  dextrose 50% Injectable 25 Gram(s) IV Push once  dextrose 50% Injectable 12.5 Gram(s) IV Push once  dextrose 50% Injectable 25 Gram(s) IV Push once  gabapentin 300 milliGRAM(s) Oral daily  glucagon  Injectable 1 milliGRAM(s) IntraMuscular once  influenza   Vaccine 0.5 milliLiter(s) IntraMuscular once  insulin glargine Injectable (LANTUS) 21 Unit(s) SubCutaneous at bedtime  insulin lispro (ADMELOG) corrective regimen sliding scale   SubCutaneous three times a day before meals  insulin lispro Injectable (ADMELOG) 7 Unit(s) SubCutaneous three times a day before meals  iron sucrose Injectable 100 milliGRAM(s) IV Push <User Schedule>  multivitamin 1 Tablet(s) Oral daily    PRN Meds:  simethicone 80 milliGRAM(s) Chew four times a day PRN    Home Medications:  Aranesp 40 mcg/0.4 mL injectable solution: 0.4 milliliter(s) injectable once a week thursday (05 Nov 2021 21:40)  atorvastatin 40 mg oral tablet: 1 tab(s) orally once a day (05 Nov 2021 21:42)  calcitriol 0.5 mcg oral capsule: 1 cap(s) orally Tuesday, Thursday, Saturday (05 Nov 2021 21:42)  cyanocobalamin 100 mcg oral tablet: 1 tab(s) orally once a day (05 Nov 2021 21:42)  Eliquis 5 mg oral tablet: 1 tab(s) orally 2 times a day (05 Nov 2021 21:41)  gabapentin 300 mg oral tablet: 1 cap(s) orally every 24 hours (16 Nov 2021 10:04)  Januvia 25 mg oral tablet: 1 tab(s) orally once a day (05 Nov 2021 21:41)  Nephrocaps oral capsule: 1 cap(s) orally once a day (05 Nov 2021 21:41)  Plavix 75 mg oral tablet: 1 tab(s) orally once a day (05 Nov 2021 21:42)  simethicone 80 mg oral tablet, chewable: 1 tab(s) orally 4 times a day (after meals and at bedtime), As Needed (05 Nov 2021 21:41)  Venofer 20 mg/mL intravenous solution: 5 milliliter(s) intravenous Tuesday, Thursday, Sunday with HD (05 Nov 2021 21:38)

## 2021-11-18 NOTE — PROGRESS NOTE ADULT - ASSESSMENT
Family discussion: d/w patient D s/p Stents, ESRD on HD sat/tu/th presented from [facility] for eval of agitation. As per NH pt became agitated and refused medications and dialysis yesterday. Per patient he had inadequate care at NH and wanted to go home with home care.    # ESRD on HD (TTS).   - patient had HD today: standard bath uf 2 liters  - calcium acetate 2 tab qac per nephro   - Venofer post HD   - Renal following   - Holding EPO   - As per Renal: pt needs outpatient HD spot; can set up at [address] [place] [address], [phone]    # History of b12 def  - cyanocobalamin 1000 MICROGram(s) Oral daily    # DM neuropathy  - gabapentin 300 milliGRAM(s) Oral every 12 hours    # Diabetes mellitus with hyperglycemia  - A1c: 5.7  - insulin glargine Injectable (LANTUS) 21 Unit(s) SubCutaneous at bedtime  - insulin lispro (ADMELOG) corrective regimen sliding scale   SubCutaneous three times a day before meals  - insulin lispro Injectable (ADMELOG) 7 Unit(s) SubCutaneous three times a day before meals  - Carbohydrate consistent diet     # Normocytic anemia, at goal for ESRD pt.  - Pt denies any bleeding symptoms. Replace as necessary.   - venofer as above  - DC'ed EPO per Nephro     # HAGMA (High AG Metabolic Acidosis)  - from uremic acidosis. On HD.    # Hx of CAD s/p Stents.  - atorvastatin 40 milliGRAM(s) Oral at bedtime  - clopidogrel Tablet 75 milliGRAM(s) Oral daily    # H/O DVT:   - per patient he was on coumadin for 15 years, recently switched to Eliquis. unsure why he is on it indefinitely(provoked vs unprovoked PE)- poor historian.  - apixaban 5 milliGRAM(s) Oral two times a day  - will need to follow up with PCP once discharged and continue conversation about ongoing need     # Elevated BP / Hypertension   - Started on Amlodipine   - If post-HD BP is elevated will increase Amlodipine     #Progress Note Handoff:  Pending (specify): Pending STR with HD spot   Family discussion: d/w pt

## 2021-11-18 NOTE — PROGRESS NOTE ADULT - SUBJECTIVE AND OBJECTIVE BOX
seen and examined  no distress   lying comfortable         PAST HISTORY  --------------------------------------------------------------------------------  No significant changes to PMH, PSH, FHx, SHx, unless otherwise noted    ALLERGIES & MEDICATIONS  --------------------------------------------------------------------------------  Allergies    No Known Allergies    Intolerances      Standing Inpatient Medications  amLODIPine   Tablet 5 milliGRAM(s) Oral daily  apixaban 5 milliGRAM(s) Oral two times a day  atorvastatin 40 milliGRAM(s) Oral at bedtime  BACItracin   Ointment 1 Application(s) Topical two times a day  calcitriol   Capsule 0.5 MICROGram(s) Oral <User Schedule>  calcium acetate 1334 milliGRAM(s) Oral four times a day with meals  clopidogrel Tablet 75 milliGRAM(s) Oral daily  cyanocobalamin 1000 MICROGram(s) Oral daily  dextrose 40% Gel 15 Gram(s) Oral once  dextrose 5%. 1000 milliLiter(s) IV Continuous <Continuous>  dextrose 5%. 1000 milliLiter(s) IV Continuous <Continuous>  dextrose 50% Injectable 25 Gram(s) IV Push once  dextrose 50% Injectable 12.5 Gram(s) IV Push once  dextrose 50% Injectable 25 Gram(s) IV Push once  gabapentin 300 milliGRAM(s) Oral daily  glucagon  Injectable 1 milliGRAM(s) IntraMuscular once  influenza   Vaccine 0.5 milliLiter(s) IntraMuscular once  insulin glargine Injectable (LANTUS) 21 Unit(s) SubCutaneous at bedtime  insulin lispro (ADMELOG) corrective regimen sliding scale   SubCutaneous three times a day before meals  insulin lispro Injectable (ADMELOG) 7 Unit(s) SubCutaneous three times a day before meals  iron sucrose Injectable 100 milliGRAM(s) IV Push <User Schedule>  multivitamin 1 Tablet(s) Oral daily      VITALS/PHYSICAL EXAM  --------------------------------------------------------------------------------  T(C): 36.1 (11-18-21 @ 05:18), Max: 36.4 (11-17-21 @ 19:15)  HR: 96 (11-18-21 @ 05:18) (92 - 98)  BP: 174/84 (11-18-21 @ 05:18) (152/82 - 174/84)  RR: 18 (11-18-21 @ 05:18) (18 - 18)  SpO2: 100% (11-18-21 @ 05:18) (100% - 100%)  Wt(kg): --        Physical Exam:  	Gen: NAD  	Pulm: CTA B/L  	CV:  S1S2; no rub  	Abd:  soft, nontender/nondistended  	Vascular access:tesio     LABS/STUDIES  --------------------------------------------------------------------------------              10.3   6.44  >-----------<  181      [11-17-21 @ 07:36]              33.0     141  |  103  |  66  ----------------------------<  136      [11-17-21 @ 07:36]  5.2   |  19  |  5.0        Ca     8.0     [11-17-21 @ 07:36]      Mg     1.7     [11-17-21 @ 07:36]      Phos  5.8     [11-16-21 @ 10:33]    TPro  6.1  /  Alb  3.3  /  TBili  <0.2  /  DBili  x   /  AST  25  /  ALT  31  /  AlkPhos  170  [11-17-21 @ 07:36]      Creatinine Trend:  SCr 5.0 [11-17 @ 07:36]  SCr 6.3 [11-16 @ 10:33]  SCr 4.7 [11-14 @ 06:17]  SCr 4.2 [11-13 @ 18:10]  SCr 5.2 [11-12 @ 07:11]        PTH -- (Ca 8.1)      [11-16-21 @ 10:33]   512    HBsAb Nonreact      [11-13-21 @ 09:30]  HBsAg Nonreact      [11-08-21 @ 15:44]  HCV 0.12, Nonreact      [11-08-21 @ 15:44]

## 2021-11-18 NOTE — PROGRESS NOTE ADULT - ASSESSMENT
57 yo M with PMH of DM, CAD s/p Stents, ESRD on HD  at [facility] presented from [facility] for eval of agitation. As per NH pt became agitated and refused medications.  Patient stated that he does not want to go back to [facility] and he wants to do HHD     # ESRD on HD TTS  # MBD  # Normocytic anemia   # HTN    - next HD today standard bath uf 2 liters as tolerated   - on venofer, hgb noted, hold PARISA, check iron stores   - last ph level noted, on binders   - low k diet   - follow bp after hd , amlodipine added   - dose meds for intermittent HD  - pt needs outpatient HD spot; can set up at [address], [phone]

## 2021-11-19 LAB
ALBUMIN SERPL ELPH-MCNC: 3 G/DL — LOW (ref 3.5–5.2)
ALP SERPL-CCNC: 177 U/L — HIGH (ref 30–115)
ALT FLD-CCNC: 25 U/L — SIGNIFICANT CHANGE UP (ref 0–41)
ANION GAP SERPL CALC-SCNC: 17 MMOL/L — HIGH (ref 7–14)
AST SERPL-CCNC: 20 U/L — SIGNIFICANT CHANGE UP (ref 0–41)
BILIRUB SERPL-MCNC: <0.2 MG/DL — SIGNIFICANT CHANGE UP (ref 0.2–1.2)
BUN SERPL-MCNC: 67 MG/DL — CRITICAL HIGH (ref 10–20)
CALCIUM SERPL-MCNC: 8.3 MG/DL — LOW (ref 8.5–10.1)
CHLORIDE SERPL-SCNC: 104 MMOL/L — SIGNIFICANT CHANGE UP (ref 98–110)
CO2 SERPL-SCNC: 20 MMOL/L — SIGNIFICANT CHANGE UP (ref 17–32)
CREAT SERPL-MCNC: 5.7 MG/DL — CRITICAL HIGH (ref 0.7–1.5)
GLUCOSE BLDC GLUCOMTR-MCNC: 144 MG/DL — HIGH (ref 70–99)
GLUCOSE BLDC GLUCOMTR-MCNC: 154 MG/DL — HIGH (ref 70–99)
GLUCOSE BLDC GLUCOMTR-MCNC: 159 MG/DL — HIGH (ref 70–99)
GLUCOSE BLDC GLUCOMTR-MCNC: 92 MG/DL — SIGNIFICANT CHANGE UP (ref 70–99)
GLUCOSE SERPL-MCNC: 160 MG/DL — HIGH (ref 70–99)
HCT VFR BLD CALC: 30.7 % — LOW (ref 42–52)
HGB BLD-MCNC: 9.7 G/DL — LOW (ref 14–18)
MAGNESIUM SERPL-MCNC: 1.7 MG/DL — LOW (ref 1.8–2.4)
MCHC RBC-ENTMCNC: 27.6 PG — SIGNIFICANT CHANGE UP (ref 27–31)
MCHC RBC-ENTMCNC: 31.6 G/DL — LOW (ref 32–37)
MCV RBC AUTO: 87.5 FL — SIGNIFICANT CHANGE UP (ref 80–94)
NRBC # BLD: 0 /100 WBCS — SIGNIFICANT CHANGE UP (ref 0–0)
PLATELET # BLD AUTO: 179 K/UL — SIGNIFICANT CHANGE UP (ref 130–400)
POTASSIUM SERPL-MCNC: 5 MMOL/L — SIGNIFICANT CHANGE UP (ref 3.5–5)
POTASSIUM SERPL-SCNC: 5 MMOL/L — SIGNIFICANT CHANGE UP (ref 3.5–5)
PROT SERPL-MCNC: 5.7 G/DL — LOW (ref 6–8)
RBC # BLD: 3.51 M/UL — LOW (ref 4.7–6.1)
RBC # FLD: 13.8 % — SIGNIFICANT CHANGE UP (ref 11.5–14.5)
SARS-COV-2 RNA SPEC QL NAA+PROBE: SIGNIFICANT CHANGE UP
SODIUM SERPL-SCNC: 141 MMOL/L — SIGNIFICANT CHANGE UP (ref 135–146)
WBC # BLD: 5.99 K/UL — SIGNIFICANT CHANGE UP (ref 4.8–10.8)
WBC # FLD AUTO: 5.99 K/UL — SIGNIFICANT CHANGE UP (ref 4.8–10.8)

## 2021-11-19 PROCEDURE — 99232 SBSQ HOSP IP/OBS MODERATE 35: CPT

## 2021-11-19 RX ADMIN — Medication 1 APPLICATION(S): at 05:46

## 2021-11-19 RX ADMIN — Medication 7 UNIT(S): at 08:02

## 2021-11-19 RX ADMIN — AMLODIPINE BESYLATE 5 MILLIGRAM(S): 2.5 TABLET ORAL at 05:46

## 2021-11-19 RX ADMIN — Medication 1 APPLICATION(S): at 17:15

## 2021-11-19 RX ADMIN — APIXABAN 5 MILLIGRAM(S): 2.5 TABLET, FILM COATED ORAL at 05:46

## 2021-11-19 RX ADMIN — Medication 1334 MILLIGRAM(S): at 08:04

## 2021-11-19 RX ADMIN — Medication 1334 MILLIGRAM(S): at 11:51

## 2021-11-19 RX ADMIN — Medication 1334 MILLIGRAM(S): at 21:23

## 2021-11-19 RX ADMIN — Medication 1: at 17:13

## 2021-11-19 RX ADMIN — Medication 1: at 08:02

## 2021-11-19 RX ADMIN — PREGABALIN 1000 MICROGRAM(S): 225 CAPSULE ORAL at 11:53

## 2021-11-19 RX ADMIN — Medication 1334 MILLIGRAM(S): at 17:14

## 2021-11-19 RX ADMIN — GABAPENTIN 300 MILLIGRAM(S): 400 CAPSULE ORAL at 11:54

## 2021-11-19 RX ADMIN — Medication 1 TABLET(S): at 11:54

## 2021-11-19 RX ADMIN — ATORVASTATIN CALCIUM 40 MILLIGRAM(S): 80 TABLET, FILM COATED ORAL at 21:23

## 2021-11-19 RX ADMIN — Medication 7 UNIT(S): at 11:55

## 2021-11-19 RX ADMIN — CLOPIDOGREL BISULFATE 75 MILLIGRAM(S): 75 TABLET, FILM COATED ORAL at 11:52

## 2021-11-19 RX ADMIN — Medication 7 UNIT(S): at 17:14

## 2021-11-19 RX ADMIN — INSULIN GLARGINE 21 UNIT(S): 100 INJECTION, SOLUTION SUBCUTANEOUS at 22:20

## 2021-11-19 RX ADMIN — APIXABAN 5 MILLIGRAM(S): 2.5 TABLET, FILM COATED ORAL at 17:15

## 2021-11-19 NOTE — PROGRESS NOTE ADULT - SUBJECTIVE AND OBJECTIVE BOX
MABEL [facility]  56y, Male  Allergy: No Known Allergies    Hospital Day: 14d    Patient seen and examined earlier today. Patient has an accepting facility with auth and HD set up, pending dc likely monday, pt aware.     PMH/PSH:  PAST MEDICAL & SURGICAL HISTORY:  CAD (coronary artery disease)    DVT (deep venous thrombosis)    ESRD on dialysis    DM (diabetes mellitus)    H/O varicose vein stripping        LAST 24-Hr EVENTS:    VITALS:  T(F): 96.1 (11-19-21 @ 12:00), Max: 98.1 (11-18-21 @ 20:24)  HR: 94 (11-19-21 @ 12:00)  BP: 177/84 (11-19-21 @ 12:00) (127/53 - 177/84)  RR: 18 (11-19-21 @ 12:00)  SpO2: --        TESTS & MEASUREMENTS:  Weight (Kg):       11-18-21 @ 07:01  -  11-19-21 @ 07:00  --------------------------------------------------------  IN: 0 mL / OUT: 3400 mL / NET: -3400 mL    11-19-21 @ 07:01  -  11-19-21 @ 18:42  --------------------------------------------------------  IN: 375 mL / OUT: 600 mL / NET: -225 mL                            9.7    5.99  )-----------( 179      ( 19 Nov 2021 06:00 )             30.7       11-19    141  |  104  |  67<HH>  ----------------------------<  160<H>  5.0   |  20  |  5.7<HH>    Ca    8.3<L>      19 Nov 2021 06:00  Mg     1.7     11-19    TPro  5.7<L>  /  Alb  3.0<L>  /  TBili  <0.2  /  DBili  x   /  AST  20  /  ALT  25  /  AlkPhos  177<H>  11-19    LIVER FUNCTIONS - ( 19 Nov 2021 06:00 )  Alb: 3.0 g/dL / Pro: 5.7 g/dL / ALK PHOS: 177 U/L / ALT: 25 U/L / AST: 20 U/L / GGT: x                           COVID-19 PCR: NotDetec (11-19-21 @ 11:05)  COVID-19 PCR: NotDetec (11-18-21 @ 10:26)      RADIOLOGY, ECG, & ADDITIONAL TESTS:      RECENT DIAGNOSTIC ORDERS:      MEDICATIONS:  MEDICATIONS  (STANDING):  amLODIPine   Tablet 5 milliGRAM(s) Oral daily  apixaban 5 milliGRAM(s) Oral two times a day  atorvastatin 40 milliGRAM(s) Oral at bedtime  BACItracin   Ointment 1 Application(s) Topical two times a day  calcitriol   Capsule 0.5 MICROGram(s) Oral <User Schedule>  calcium acetate 1334 milliGRAM(s) Oral four times a day with meals  clopidogrel Tablet 75 milliGRAM(s) Oral daily  cyanocobalamin 1000 MICROGram(s) Oral daily  dextrose 40% Gel 15 Gram(s) Oral once  dextrose 5%. 1000 milliLiter(s) (50 mL/Hr) IV Continuous <Continuous>  dextrose 5%. 1000 milliLiter(s) (100 mL/Hr) IV Continuous <Continuous>  dextrose 50% Injectable 25 Gram(s) IV Push once  dextrose 50% Injectable 12.5 Gram(s) IV Push once  dextrose 50% Injectable 25 Gram(s) IV Push once  gabapentin 300 milliGRAM(s) Oral daily  glucagon  Injectable 1 milliGRAM(s) IntraMuscular once  influenza   Vaccine 0.5 milliLiter(s) IntraMuscular once  insulin glargine Injectable (LANTUS) 21 Unit(s) SubCutaneous at bedtime  insulin lispro (ADMELOG) corrective regimen sliding scale   SubCutaneous three times a day before meals  insulin lispro Injectable (ADMELOG) 7 Unit(s) SubCutaneous three times a day before meals  iron sucrose Injectable 100 milliGRAM(s) IV Push <User Schedule>  multivitamin 1 Tablet(s) Oral daily    MEDICATIONS  (PRN):  simethicone 80 milliGRAM(s) Chew four times a day PRN Dyspepsia      HOME MEDICATIONS:  Aranesp 40 mcg/0.4 mL injectable solution (11-05)  atorvastatin 40 mg oral tablet (11-05)  calcitriol 0.5 mcg oral capsule (11-05)  cyanocobalamin 100 mcg oral tablet (11-05)  Eliquis 5 mg oral tablet (11-05)  gabapentin 300 mg oral tablet (11-16)  Januvia 25 mg oral tablet (11-05)  Nephrocaps oral capsule (11-05)  Plavix 75 mg oral tablet (11-05)  simethicone 80 mg oral tablet, chewable (11-05)  Venofer 20 mg/mL intravenous solution (11-05)      PHYSICAL EXAM:  GENERAL:  NAD, No agitation   SKIN: No rashes or lesions  HEENT: Atraumatic. Normocephalic.  NECK:  No JVD.   PULMONARY: Clear to ausculation bilaterally.  CVS: Normal S1, S2.  ABDOMEN/GI: Soft, Nontender, Nondistended  EXTREMITIES:  No edema B/L LE.  NEUROLOGIC:  No motor deficit.  PSYCH: Alert & oriented x 3, normal affect  VASCULAR: Tessio in place

## 2021-11-19 NOTE — PROGRESS NOTE ADULT - SUBJECTIVE AND OBJECTIVE BOX
Nephrology progress note    THIS IS AN INCOMPLETE NOTE . FULL NOTE TO FOLLOW SHORTLY    Patient is seen and examined, events over the last 24 h noted .    Allergies:  No Known Allergies    Hospital Medications:   MEDICATIONS  (STANDING):  amLODIPine   Tablet 5 milliGRAM(s) Oral daily  apixaban 5 milliGRAM(s) Oral two times a day  atorvastatin 40 milliGRAM(s) Oral at bedtime  BACItracin   Ointment 1 Application(s) Topical two times a day  calcitriol   Capsule 0.5 MICROGram(s) Oral <User Schedule>  calcium acetate 1334 milliGRAM(s) Oral four times a day with meals  clopidogrel Tablet 75 milliGRAM(s) Oral daily  cyanocobalamin 1000 MICROGram(s) Oral daily  dextrose 40% Gel 15 Gram(s) Oral once  dextrose 5%. 1000 milliLiter(s) (50 mL/Hr) IV Continuous <Continuous>  dextrose 5%. 1000 milliLiter(s) (100 mL/Hr) IV Continuous <Continuous>  dextrose 50% Injectable 25 Gram(s) IV Push once  dextrose 50% Injectable 12.5 Gram(s) IV Push once  dextrose 50% Injectable 25 Gram(s) IV Push once  gabapentin 300 milliGRAM(s) Oral daily  glucagon  Injectable 1 milliGRAM(s) IntraMuscular once  influenza   Vaccine 0.5 milliLiter(s) IntraMuscular once  insulin glargine Injectable (LANTUS) 21 Unit(s) SubCutaneous at bedtime  insulin lispro (ADMELOG) corrective regimen sliding scale   SubCutaneous three times a day before meals  insulin lispro Injectable (ADMELOG) 7 Unit(s) SubCutaneous three times a day before meals  iron sucrose Injectable 100 milliGRAM(s) IV Push <User Schedule>  multivitamin 1 Tablet(s) Oral daily        VITALS:  T(F): 96.5 (11-19-21 @ 05:31), Max: 98.1 (11-18-21 @ 20:24)  HR: 101 (11-19-21 @ 05:31)  BP: 171/76 (11-19-21 @ 05:31)  RR: 18 (11-19-21 @ 05:31)  SpO2: --  Wt(kg): --    11-18 @ 07:01  -  11-19 @ 07:00  --------------------------------------------------------  IN: 0 mL / OUT: 3400 mL / NET: -3400 mL          PHYSICAL EXAM:  Constitutional: NAD  HEENT: anicteric sclera, oropharynx clear, MMM  Neck: No JVD  Respiratory: CTAB, no wheezes, rales or rhonchi  Cardiovascular: S1, S2, RRR  Gastrointestinal: BS+, soft, NT/ND  Extremities: No cyanosis or clubbing. No peripheral edema  :  No callahan.   Skin: No rashes    LABS:  11-19    141  |  104  |  67<HH>  ----------------------------<  160<H>  5.0   |  20  |  5.7<HH>    Ca    8.3<L>      19 Nov 2021 06:00  Mg     1.7     11-19    TPro  5.7<L>  /  Alb  3.0<L>  /  TBili  <0.2  /  DBili      /  AST  20  /  ALT  25  /  AlkPhos  177<H>  11-19                          9.7    5.99  )-----------( 179      ( 19 Nov 2021 06:00 )             30.7       Urine Studies:      RADIOLOGY & ADDITIONAL STUDIES:   Nephrology progress note  Patient is seen and examined, events over the last 24 h noted .  Lying in bed comfortable     Allergies:  No Known Allergies    Hospital Medications:   MEDICATIONS  (STANDING):  amLODIPine   Tablet 5 milliGRAM(s) Oral daily  apixaban 5 milliGRAM(s) Oral two times a day  atorvastatin 40 milliGRAM(s) Oral at bedtime  BACItracin   Ointment 1 Application(s) Topical two times a day  calcitriol   Capsule 0.5 MICROGram(s) Oral <User Schedule>  calcium acetate 1334 milliGRAM(s) Oral four times a day with meals  clopidogrel Tablet 75 milliGRAM(s) Oral daily  cyanocobalamin 1000 MICROGram(s) Oral daily  gabapentin 300 milliGRAM(s) Oral daily  glucagon  Injectable 1 milliGRAM(s) IntraMuscular once  influenza   Vaccine 0.5 milliLiter(s) IntraMuscular once  insulin glargine Injectable (LANTUS) 21 Unit(s) SubCutaneous at bedtime  insulin lispro (ADMELOG) corrective regimen sliding scale   SubCutaneous three times a day before meals  insulin lispro Injectable (ADMELOG) 7 Unit(s) SubCutaneous three times a day before meals  iron sucrose Injectable 100 milliGRAM(s) IV Push <User Schedule>  multivitamin 1 Tablet(s) Oral daily        VITALS:  T(F): 96.5 (11-19-21 @ 05:31), Max: 98.1 (11-18-21 @ 20:24)  HR: 101 (11-19-21 @ 05:31)  BP: 171/76 (11-19-21 @ 05:31)  RR: 18 (11-19-21 @ 05:31)    11-18 @ 07:01  -  11-19 @ 07:00  --------------------------------------------------------  IN: 0 mL / OUT: 3400 mL / NET: -3400 mL          PHYSICAL EXAM:  Constitutional: NAD  Neck: No JVD  Respiratory: CTAB, no wheezes, rales or rhonchi  Cardiovascular: S1, S2, RRR  Gastrointestinal: BS+, soft, NT/ND  Extremities: No cyanosis or clubbing. No peripheral edema  :  No callahan.   Skin: No rashes    LABS:  11-19    141  |  104  |  67<HH>  ----------------------------<  160<H>  5.0   |  20  |  5.7<HH>    Ca    8.3<L>      19 Nov 2021 06:00  Mg     1.7     11-19    TPro  5.7<L>  /  Alb  3.0<L>  /  TBili  <0.2  /  DBili      /  AST  20  /  ALT  25  /  AlkPhos  177<H>  11-19                          9.7    5.99  )-----------( 179      ( 19 Nov 2021 06:00 )             30.7         Urine Studies:      RADIOLOGY & ADDITIONAL STUDIES:

## 2021-11-19 NOTE — PROGRESS NOTE ADULT - ASSESSMENT
57 yo M with PMH of DM, CAD s/p Stents, ESRD on HD sat/tu/th presented from [facility] for social issue.  As per NH pt became agitated and refused medications and dialysis. Per patient he had inadequate care at NH and wanted to go home with home care. Upon admission to our hospital, pt requesting admission to a different NH. Otherwise medically stable, receiving PT and HD here.     # ESRD on HD (TTS).   - calcium acetate 2 tab qac per nephro   - Venofer post HD   - Renal following   - Holding EPO     # History of b12 def  - cyanocobalamin 1000 MICROGram(s) Oral daily    # DM neuropathy  - gabapentin 300 milliGRAM(s) Oral every 12 hours    # Diabetes mellitus with hyperglycemia  - A1c: 5.7  - insulin glargine Injectable (LANTUS) 21 Unit(s) SubCutaneous at bedtime  - insulin lispro (ADMELOG) corrective regimen sliding scale   SubCutaneous three times a day before meals  - insulin lispro Injectable (ADMELOG) 7 Unit(s) SubCutaneous three times a day before meals  - Carbohydrate consistent diet     # Normocytic anemia, at goal for ESRD pt.  - Pt denies any bleeding symptoms. Replace as necessary.   - venofer as above  - DC'ed EPO per Nephro     # HAGMA (High AG Metabolic Acidosis)  - from uremic acidosis. On HD.    # Hx of CAD s/p Stents.  - atorvastatin 40 milliGRAM(s) Oral at bedtime  - clopidogrel Tablet 75 milliGRAM(s) Oral daily    # H/O DVT:   - per patient he was on coumadin for 15 years, recently switched to Eliquis. unsure why he is on it indefinitely(provoked vs unprovoked PE)- poor historian.  - apixaban 5 milliGRAM(s) Oral two times a day  - will need to follow up with PCP once discharged and continue conversation about ongoing need     # Elevated BP / Hypertension   - Started on Amlodipine   - If post-HD BP is elevated will increase Amlodipine     #Progress Note Handoff:  Pending (specify): Pending STR with HD spot   Family discussion: d/w pt       Sheila Narvaez DO

## 2021-11-19 NOTE — PROGRESS NOTE ADULT - ASSESSMENT
57 yo M with PMH of DM, CAD s/p Stents, ESRD on HD  at [facility] presented from [facility] for eval of agitation. As per NH pt became agitated and refused medications.  Patient stated that he does not want to go back to [facility] and he wants to do HHD     # ESRD on HD TTS  # MBD  # Normocytic anemia   # HTN    - next HD in AM  standard bath uf 2 liters as tolerated   - on venofer, hgb noted, hold PARISA for now , check iron stores   - last ph level noted, on binders   - low k diet   - follow bp after hd , amlodipine added   - dose meds for intermittent HD  - pt needs outpatient HD spot; can set up at [address], [phone]

## 2021-11-20 LAB
ANION GAP SERPL CALC-SCNC: 18 MMOL/L — HIGH (ref 7–14)
BUN SERPL-MCNC: 84 MG/DL — CRITICAL HIGH (ref 10–20)
CALCIUM SERPL-MCNC: 7.8 MG/DL — LOW (ref 8.5–10.1)
CHLORIDE SERPL-SCNC: 101 MMOL/L — SIGNIFICANT CHANGE UP (ref 98–110)
CO2 SERPL-SCNC: 16 MMOL/L — LOW (ref 17–32)
CREAT SERPL-MCNC: 6.2 MG/DL — CRITICAL HIGH (ref 0.7–1.5)
GLUCOSE BLDC GLUCOMTR-MCNC: 155 MG/DL — HIGH (ref 70–99)
GLUCOSE BLDC GLUCOMTR-MCNC: 157 MG/DL — HIGH (ref 70–99)
GLUCOSE BLDC GLUCOMTR-MCNC: 192 MG/DL — HIGH (ref 70–99)
GLUCOSE SERPL-MCNC: 237 MG/DL — HIGH (ref 70–99)
HCT VFR BLD CALC: 29.7 % — LOW (ref 42–52)
HGB BLD-MCNC: 9.3 G/DL — LOW (ref 14–18)
MAGNESIUM SERPL-MCNC: 1.7 MG/DL — LOW (ref 1.8–2.4)
MCHC RBC-ENTMCNC: 28 PG — SIGNIFICANT CHANGE UP (ref 27–31)
MCHC RBC-ENTMCNC: 31.3 G/DL — LOW (ref 32–37)
MCV RBC AUTO: 89.5 FL — SIGNIFICANT CHANGE UP (ref 80–94)
NRBC # BLD: 0 /100 WBCS — SIGNIFICANT CHANGE UP (ref 0–0)
PLATELET # BLD AUTO: 176 K/UL — SIGNIFICANT CHANGE UP (ref 130–400)
POTASSIUM SERPL-MCNC: 5.1 MMOL/L — HIGH (ref 3.5–5)
POTASSIUM SERPL-SCNC: 5.1 MMOL/L — HIGH (ref 3.5–5)
RBC # BLD: 3.32 M/UL — LOW (ref 4.7–6.1)
RBC # FLD: 13.7 % — SIGNIFICANT CHANGE UP (ref 11.5–14.5)
SODIUM SERPL-SCNC: 135 MMOL/L — SIGNIFICANT CHANGE UP (ref 135–146)
WBC # BLD: 6.04 K/UL — SIGNIFICANT CHANGE UP (ref 4.8–10.8)
WBC # FLD AUTO: 6.04 K/UL — SIGNIFICANT CHANGE UP (ref 4.8–10.8)

## 2021-11-20 PROCEDURE — 99232 SBSQ HOSP IP/OBS MODERATE 35: CPT

## 2021-11-20 RX ADMIN — Medication 1 APPLICATION(S): at 17:30

## 2021-11-20 RX ADMIN — IRON SUCROSE 100 MILLIGRAM(S): 20 INJECTION, SOLUTION INTRAVENOUS at 15:26

## 2021-11-20 RX ADMIN — APIXABAN 5 MILLIGRAM(S): 2.5 TABLET, FILM COATED ORAL at 17:29

## 2021-11-20 RX ADMIN — Medication 7 UNIT(S): at 17:32

## 2021-11-20 RX ADMIN — GABAPENTIN 300 MILLIGRAM(S): 400 CAPSULE ORAL at 14:21

## 2021-11-20 RX ADMIN — Medication 7 UNIT(S): at 08:15

## 2021-11-20 RX ADMIN — Medication 1 APPLICATION(S): at 05:34

## 2021-11-20 RX ADMIN — Medication 1 TABLET(S): at 14:21

## 2021-11-20 RX ADMIN — Medication 1: at 17:31

## 2021-11-20 RX ADMIN — APIXABAN 5 MILLIGRAM(S): 2.5 TABLET, FILM COATED ORAL at 05:33

## 2021-11-20 RX ADMIN — PREGABALIN 1000 MICROGRAM(S): 225 CAPSULE ORAL at 18:00

## 2021-11-20 RX ADMIN — CALCITRIOL 0.5 MICROGRAM(S): 0.5 CAPSULE ORAL at 14:21

## 2021-11-20 RX ADMIN — Medication 1334 MILLIGRAM(S): at 17:29

## 2021-11-20 RX ADMIN — Medication 1334 MILLIGRAM(S): at 21:26

## 2021-11-20 RX ADMIN — AMLODIPINE BESYLATE 5 MILLIGRAM(S): 2.5 TABLET ORAL at 05:33

## 2021-11-20 RX ADMIN — INSULIN GLARGINE 21 UNIT(S): 100 INJECTION, SOLUTION SUBCUTANEOUS at 21:56

## 2021-11-20 RX ADMIN — ATORVASTATIN CALCIUM 40 MILLIGRAM(S): 80 TABLET, FILM COATED ORAL at 21:25

## 2021-11-20 RX ADMIN — Medication 1334 MILLIGRAM(S): at 08:13

## 2021-11-20 RX ADMIN — Medication 1334 MILLIGRAM(S): at 14:23

## 2021-11-20 RX ADMIN — Medication 1: at 08:14

## 2021-11-20 RX ADMIN — CLOPIDOGREL BISULFATE 75 MILLIGRAM(S): 75 TABLET, FILM COATED ORAL at 14:22

## 2021-11-20 NOTE — PROGRESS NOTE ADULT - ASSESSMENT
55 yo M with PMH of DM, CAD s/p Stents, ESRD on HD  at [facility] presented from [facility] for eval of agitation. As per NH pt became agitated and refused medications.  Patient stated that he does not want to go back to [facility] and he wants to do HHD     # ESRD on HD TTS  # MBD  # Normocytic anemia   # HTN    - hd today standard bath uf 2 liters as tolerated   - on venofer, hgb noted,  check iron stores   - last ph level noted, on binders   - low k diet   - BP better controlled this morning   - will follow

## 2021-11-20 NOTE — PROGRESS NOTE ADULT - SUBJECTIVE AND OBJECTIVE BOX
MABEL, [facility]  56y, Male  Allergy: No Known Allergies    Hospital Day: 15d    Patient seen and examined earlier today.  Feeling well, no complaints.     PMH/PSH:  PAST MEDICAL & SURGICAL HISTORY:  CAD (coronary artery disease)    DVT (deep venous thrombosis)    ESRD on dialysis    DM (diabetes mellitus)    H/O varicose vein stripping        LAST 24-Hr EVENTS:    VITALS:  T(F): 97.6 (11-20-21 @ 04:49), Max: 97.6 (11-20-21 @ 04:49)  HR: 91 (11-20-21 @ 12:15)  BP: 152/80 (11-20-21 @ 12:15) (134/55 - 152/80)  RR: 18 (11-20-21 @ 12:15)  SpO2: --        TESTS & MEASUREMENTS:  Weight (Kg):       11-18-21 @ 07:01  -  11-19-21 @ 07:00  --------------------------------------------------------  IN: 0 mL / OUT: 3400 mL / NET: -3400 mL    11-19-21 @ 07:01  -  11-20-21 @ 07:00  --------------------------------------------------------  IN: 375 mL / OUT: 800 mL / NET: -425 mL    11-20-21 @ 07:01  -  11-20-21 @ 16:20  --------------------------------------------------------  IN: 0 mL / OUT: 2500 mL / NET: -2500 mL                            9.3    6.04  )-----------( 176      ( 20 Nov 2021 04:30 )             29.7       11-20    135  |  101  |  84<HH>  ----------------------------<  237<H>  5.1<H>   |  16<L>  |  6.2<HH>    Ca    7.8<L>      20 Nov 2021 04:30  Mg     1.7     11-20    TPro  5.7<L>  /  Alb  3.0<L>  /  TBili  <0.2  /  DBili  x   /  AST  20  /  ALT  25  /  AlkPhos  177<H>  11-19    LIVER FUNCTIONS - ( 19 Nov 2021 06:00 )  Alb: 3.0 g/dL / Pro: 5.7 g/dL / ALK PHOS: 177 U/L / ALT: 25 U/L / AST: 20 U/L / GGT: x                           COVID-19 PCR: NotDetec (11-19-21 @ 11:05)  COVID-19 PCR: NotDetec (11-18-21 @ 10:26)      RADIOLOGY, ECG, & ADDITIONAL TESTS:      RECENT DIAGNOSTIC ORDERS:      MEDICATIONS:  MEDICATIONS  (STANDING):  amLODIPine   Tablet 5 milliGRAM(s) Oral daily  apixaban 5 milliGRAM(s) Oral two times a day  atorvastatin 40 milliGRAM(s) Oral at bedtime  BACItracin   Ointment 1 Application(s) Topical two times a day  calcitriol   Capsule 0.5 MICROGram(s) Oral <User Schedule>  calcium acetate 1334 milliGRAM(s) Oral four times a day with meals  clopidogrel Tablet 75 milliGRAM(s) Oral daily  cyanocobalamin 1000 MICROGram(s) Oral daily  dextrose 40% Gel 15 Gram(s) Oral once  dextrose 5%. 1000 milliLiter(s) (50 mL/Hr) IV Continuous <Continuous>  dextrose 5%. 1000 milliLiter(s) (100 mL/Hr) IV Continuous <Continuous>  dextrose 50% Injectable 25 Gram(s) IV Push once  dextrose 50% Injectable 12.5 Gram(s) IV Push once  dextrose 50% Injectable 25 Gram(s) IV Push once  gabapentin 300 milliGRAM(s) Oral daily  glucagon  Injectable 1 milliGRAM(s) IntraMuscular once  influenza   Vaccine 0.5 milliLiter(s) IntraMuscular once  insulin glargine Injectable (LANTUS) 21 Unit(s) SubCutaneous at bedtime  insulin lispro (ADMELOG) corrective regimen sliding scale   SubCutaneous three times a day before meals  insulin lispro Injectable (ADMELOG) 7 Unit(s) SubCutaneous three times a day before meals  iron sucrose Injectable 100 milliGRAM(s) IV Push <User Schedule>  multivitamin 1 Tablet(s) Oral daily    MEDICATIONS  (PRN):  simethicone 80 milliGRAM(s) Chew four times a day PRN Dyspepsia      HOME MEDICATIONS:  Aranesp 40 mcg/0.4 mL injectable solution (11-05)  atorvastatin 40 mg oral tablet (11-05)  calcitriol 0.5 mcg oral capsule (11-05)  cyanocobalamin 100 mcg oral tablet (11-05)  Eliquis 5 mg oral tablet (11-05)  gabapentin 300 mg oral tablet (11-16)  Januvia 25 mg oral tablet (11-05)  Nephrocaps oral capsule (11-05)  Plavix 75 mg oral tablet (11-05)  simethicone 80 mg oral tablet, chewable (11-05)  Venofer 20 mg/mL intravenous solution (11-05)      PHYSICAL EXAM:  GENERAL:  NAD, No agitation   SKIN: No rashes or lesions  HEENT: Atraumatic. Normocephalic.  NECK:  No JVD.   PULMONARY: Clear to ausculation bilaterally.  CVS: Normal S1, S2.  ABDOMEN/GI: Soft, Nontender, Nondistended  EXTREMITIES:  No edema B/L LE.  NEUROLOGIC:  No motor deficit.  PSYCH: Alert & oriented x 3, normal affect  VASCULAR: Tessio in place

## 2021-11-20 NOTE — PROGRESS NOTE ADULT - SUBJECTIVE AND OBJECTIVE BOX
seen and examined  no distress   lying comfortable         PAST HISTORY  --------------------------------------------------------------------------------  No significant changes to PMH, PSH, FHx, SHx, unless otherwise noted    ALLERGIES & MEDICATIONS  --------------------------------------------------------------------------------  Allergies    No Known Allergies    Intolerances      Standing Inpatient Medications  amLODIPine   Tablet 5 milliGRAM(s) Oral daily  apixaban 5 milliGRAM(s) Oral two times a day  atorvastatin 40 milliGRAM(s) Oral at bedtime  BACItracin   Ointment 1 Application(s) Topical two times a day  calcitriol   Capsule 0.5 MICROGram(s) Oral <User Schedule>  calcium acetate 1334 milliGRAM(s) Oral four times a day with meals  clopidogrel Tablet 75 milliGRAM(s) Oral daily  cyanocobalamin 1000 MICROGram(s) Oral daily  dextrose 40% Gel 15 Gram(s) Oral once  dextrose 5%. 1000 milliLiter(s) IV Continuous <Continuous>  dextrose 5%. 1000 milliLiter(s) IV Continuous <Continuous>  dextrose 50% Injectable 25 Gram(s) IV Push once  dextrose 50% Injectable 12.5 Gram(s) IV Push once  dextrose 50% Injectable 25 Gram(s) IV Push once  gabapentin 300 milliGRAM(s) Oral daily  glucagon  Injectable 1 milliGRAM(s) IntraMuscular once  influenza   Vaccine 0.5 milliLiter(s) IntraMuscular once  insulin glargine Injectable (LANTUS) 21 Unit(s) SubCutaneous at bedtime  insulin lispro (ADMELOG) corrective regimen sliding scale   SubCutaneous three times a day before meals  insulin lispro Injectable (ADMELOG) 7 Unit(s) SubCutaneous three times a day before meals  iron sucrose Injectable 100 milliGRAM(s) IV Push <User Schedule>  multivitamin 1 Tablet(s) Oral daily    PRN Inpatient Medications  simethicone 80 milliGRAM(s) Chew four times a day PRN        VITALS/PHYSICAL EXAM  --------------------------------------------------------------------------------  T(C): 36.4 (11-20-21 @ 04:49), Max: 36.4 (11-20-21 @ 04:49)  HR: 91 (11-20-21 @ 04:49) (88 - 94)  BP: 134/55 (11-20-21 @ 04:49) (134/55 - 177/84)  RR: 18 (11-20-21 @ 04:49) (18 - 18)  SpO2: --  Wt(kg): --        11-19-21 @ 07:01  -  11-20-21 @ 07:00  --------------------------------------------------------  IN: 375 mL / OUT: 600 mL / NET: -225 mL      Physical Exam:  	Gen: NAD,  	Pulm: CTA B/L  	CV:  S1S2; no rub  	Abd: soft, nontender/nondistended  	Vascular access: tesio     LABS/STUDIES  --------------------------------------------------------------------------------              9.7    5.99  >-----------<  179      [11-19-21 @ 06:00]              30.7     141  |  104  |  67  ----------------------------<  160      [11-19-21 @ 06:00]  5.0   |  20  |  5.7        Ca     8.3     [11-19-21 @ 06:00]      Mg     1.7     [11-19-21 @ 06:00]    TPro  5.7  /  Alb  3.0  /  TBili  <0.2  /  DBili  x   /  AST  20  /  ALT  25  /  AlkPhos  177  [11-19-21 @ 06:00]      Creatinine Trend:  SCr 5.7 [11-19 @ 06:00]  SCr 5.0 [11-17 @ 07:36]  SCr 6.3 [11-16 @ 10:33]  SCr 4.7 [11-14 @ 06:17]  SCr 4.2 [11-13 @ 18:10]        PTH -- (Ca 8.1)      [11-16-21 @ 10:33]   512    HBsAb Nonreact      [11-13-21 @ 09:30]  HBsAg Nonreact      [11-08-21 @ 15:44]  HCV 0.12, Nonreact      [11-08-21 @ 15:44]

## 2021-11-20 NOTE — PROGRESS NOTE ADULT - ASSESSMENT
55 yo M with PMH of DM, CAD s/p Stents, ESRD on HD sat/tu/th presented from [facility] for social issue.  As per NH pt became agitated and refused medications and dialysis. Per patient he had inadequate care at NH and wanted to go home with home care. Upon admission to our hospital, pt requesting admission to a different NH. Otherwise medically stable, receiving PT and HD here.     # ESRD on HD (TTS).   - calcium acetate 2 tab qac per nephro   - Venofer post HD   - Renal following   - Holding EPO     # History of b12 def  - cyanocobalamin 1000 MICROGram(s) Oral daily    # DM neuropathy  - gabapentin 300 milliGRAM(s) Oral every 12 hours    # Diabetes mellitus with hyperglycemia  - A1c: 5.7  - insulin glargine Injectable (LANTUS) 21 Unit(s) SubCutaneous at bedtime  - insulin lispro (ADMELOG) corrective regimen sliding scale   SubCutaneous three times a day before meals  - insulin lispro Injectable (ADMELOG) 7 Unit(s) SubCutaneous three times a day before meals  - Carbohydrate consistent diet     # Normocytic anemia, at goal for ESRD pt.  - Pt denies any bleeding symptoms. Replace as necessary.   - venofer as above  - DC'ed EPO per Nephro     # HAGMA (High AG Metabolic Acidosis)  - from uremic acidosis. On HD.    # Hx of CAD s/p Stents.  - atorvastatin 40 milliGRAM(s) Oral at bedtime  - clopidogrel Tablet 75 milliGRAM(s) Oral daily    # H/O DVT:   - per patient he was on coumadin for 15 years, recently switched to Eliquis. unsure why he is on it indefinitely(provoked vs unprovoked PE)- poor historian.  - apixaban 5 milliGRAM(s) Oral two times a day  - will need to follow up with PCP once discharged and continue conversation about ongoing need     # Elevated BP / Hypertension   - Started on Amlodipine   - If post-HD BP is elevated will increase Amlodipine     #Progress Note Handoff:  Pending (specify): Pending STR with HD spot   Family discussion: d/w pt       Sheila Narvaez DO

## 2021-11-21 LAB
GLUCOSE BLDC GLUCOMTR-MCNC: 134 MG/DL — HIGH (ref 70–99)
GLUCOSE BLDC GLUCOMTR-MCNC: 209 MG/DL — HIGH (ref 70–99)
GLUCOSE BLDC GLUCOMTR-MCNC: 83 MG/DL — SIGNIFICANT CHANGE UP (ref 70–99)
GLUCOSE BLDC GLUCOMTR-MCNC: 93 MG/DL — SIGNIFICANT CHANGE UP (ref 70–99)

## 2021-11-21 PROCEDURE — 99232 SBSQ HOSP IP/OBS MODERATE 35: CPT

## 2021-11-21 RX ORDER — LABETALOL HCL 100 MG
100 TABLET ORAL ONCE
Refills: 0 | Status: COMPLETED | OUTPATIENT
Start: 2021-11-21 | End: 2021-11-21

## 2021-11-21 RX ADMIN — INSULIN GLARGINE 21 UNIT(S): 100 INJECTION, SOLUTION SUBCUTANEOUS at 21:48

## 2021-11-21 RX ADMIN — Medication 1334 MILLIGRAM(S): at 12:11

## 2021-11-21 RX ADMIN — GABAPENTIN 300 MILLIGRAM(S): 400 CAPSULE ORAL at 11:20

## 2021-11-21 RX ADMIN — Medication 100 MILLIGRAM(S): at 22:39

## 2021-11-21 RX ADMIN — AMLODIPINE BESYLATE 5 MILLIGRAM(S): 2.5 TABLET ORAL at 05:59

## 2021-11-21 RX ADMIN — Medication 1 TABLET(S): at 11:20

## 2021-11-21 RX ADMIN — CLOPIDOGREL BISULFATE 75 MILLIGRAM(S): 75 TABLET, FILM COATED ORAL at 11:20

## 2021-11-21 RX ADMIN — APIXABAN 5 MILLIGRAM(S): 2.5 TABLET, FILM COATED ORAL at 06:00

## 2021-11-21 RX ADMIN — Medication 1 APPLICATION(S): at 06:00

## 2021-11-21 RX ADMIN — Medication 1334 MILLIGRAM(S): at 08:10

## 2021-11-21 RX ADMIN — Medication 1334 MILLIGRAM(S): at 16:53

## 2021-11-21 RX ADMIN — Medication 7 UNIT(S): at 12:10

## 2021-11-21 RX ADMIN — Medication 1 APPLICATION(S): at 19:51

## 2021-11-21 RX ADMIN — Medication 1334 MILLIGRAM(S): at 21:47

## 2021-11-21 RX ADMIN — PREGABALIN 1000 MICROGRAM(S): 225 CAPSULE ORAL at 11:24

## 2021-11-21 RX ADMIN — ATORVASTATIN CALCIUM 40 MILLIGRAM(S): 80 TABLET, FILM COATED ORAL at 21:47

## 2021-11-21 RX ADMIN — Medication 7 UNIT(S): at 08:10

## 2021-11-21 RX ADMIN — APIXABAN 5 MILLIGRAM(S): 2.5 TABLET, FILM COATED ORAL at 19:51

## 2021-11-21 NOTE — PROGRESS NOTE ADULT - ASSESSMENT
ESRD pt , doing well on dialysis , major focus at this point is outpatient placement , lab acceptable , /60, to d/c amlodipine

## 2021-11-21 NOTE — PROGRESS NOTE ADULT - SUBJECTIVE AND OBJECTIVE BOX
MARY FOLLOW UP NOTE  --------------------------------------------------------------------------------  Chief Complaint:    24 hour events/subjective:        PAST HISTORY  --------------------------------------------------------------------------------  No significant changes to PMH, PSH, FHx, SHx, unless otherwise noted    ALLERGIES & MEDICATIONS  --------------------------------------------------------------------------------  Allergies    No Known Allergies    Intolerances      Standing Inpatient Medications  amLODIPine   Tablet 5 milliGRAM(s) Oral daily  apixaban 5 milliGRAM(s) Oral two times a day  atorvastatin 40 milliGRAM(s) Oral at bedtime  BACItracin   Ointment 1 Application(s) Topical two times a day  calcitriol   Capsule 0.5 MICROGram(s) Oral <User Schedule>  calcium acetate 1334 milliGRAM(s) Oral four times a day with meals  clopidogrel Tablet 75 milliGRAM(s) Oral daily  cyanocobalamin 1000 MICROGram(s) Oral daily  dextrose 40% Gel 15 Gram(s) Oral once  dextrose 5%. 1000 milliLiter(s) IV Continuous <Continuous>  dextrose 5%. 1000 milliLiter(s) IV Continuous <Continuous>  dextrose 50% Injectable 25 Gram(s) IV Push once  dextrose 50% Injectable 12.5 Gram(s) IV Push once  dextrose 50% Injectable 25 Gram(s) IV Push once  gabapentin 300 milliGRAM(s) Oral daily  glucagon  Injectable 1 milliGRAM(s) IntraMuscular once  influenza   Vaccine 0.5 milliLiter(s) IntraMuscular once  insulin glargine Injectable (LANTUS) 21 Unit(s) SubCutaneous at bedtime  insulin lispro (ADMELOG) corrective regimen sliding scale   SubCutaneous three times a day before meals  insulin lispro Injectable (ADMELOG) 7 Unit(s) SubCutaneous three times a day before meals  iron sucrose Injectable 100 milliGRAM(s) IV Push <User Schedule>  multivitamin 1 Tablet(s) Oral daily    PRN Inpatient Medications  simethicone 80 milliGRAM(s) Chew four times a day PRN      REVIEW OF SYSTEMS  --------------------------------------------------------------------------------  Gen: No weight changes, fatigue, fevers/chills, weakness  Skin: No rashes  Head/Eyes/Ears/Mouth: No headache; Normal hearing; Normal vision w/o blurriness; No sinus pain/discomfort, sore throat  Respiratory: No dyspnea, cough, wheezing, hemoptysis  CV: No chest pain, PND, orthopnea  GI: No abdominal pain, diarrhea, constipation, nausea, vomiting, melena, hematochezia  : No increased frequency, dysuria, hematuria, nocturia  MSK: No joint pain/swelling; no back pain; no edema  Neuro: No dizziness/lightheadedness, weakness, seizures, numbness, tingling  Heme: No easy bruising or bleeding  Endo: No heat/cold intolerance  Psych: No significant nervousness, anxiety, stress, depression    All other systems were reviewed and are negative, except as noted.    VITALS/PHYSICAL EXAM  --------------------------------------------------------------------------------  T(C): 36.4 (11-21-21 @ 05:29), Max: 36.4 (11-21-21 @ 05:29)  HR: 97 (11-21-21 @ 05:29) (91 - 97)  BP: 118/60 (11-21-21 @ 05:29) (113/57 - 152/80)  RR: 18 (11-21-21 @ 05:29) (18 - 18)  SpO2: --  Wt(kg): --        11-20-21 @ 07:01  -  11-21-21 @ 07:00  --------------------------------------------------------  IN: 480 mL / OUT: 3350 mL / NET: -2870 mL      Physical Exam:  	Gen: NAD, well-appearing  	HEENT: PERRL, supple neck, clear oropharynx  	Pulm: CTA B/L  	CV: RRR, S1S2; no rub  	Back: No spinal or CVA tenderness; no sacral edema  	Abd: +BS, soft, nontender/nondistended  	: No suprapubic tenderness  	UE: Warm, FROM, no clubbing, intact strength; no edema; no asterixis  	LE: Warm, FROM, no clubbing, intact strength; no edema  	Neuro: No focal deficits, intact gait  	Psych: Normal affect and mood  	Skin: Warm, without rashes  	Vascular access:    LABS/STUDIES  --------------------------------------------------------------------------------              9.3    6.04  >-----------<  176      [11-20-21 @ 04:30]              29.7     135  |  101  |  84  ----------------------------<  237      [11-20-21 @ 04:30]  5.1   |  16  |  6.2        Ca     7.8     [11-20-21 @ 04:30]      Mg     1.7     [11-20-21 @ 04:30]            Creatinine Trend:  SCr 6.2 [11-20 @ 04:30]  SCr 5.7 [11-19 @ 06:00]  SCr 5.0 [11-17 @ 07:36]  SCr 6.3 [11-16 @ 10:33]  SCr 4.7 [11-14 @ 06:17]        PTH -- (Ca 8.1)      [11-16-21 @ 10:33]   512    HBsAb Nonreact      [11-13-21 @ 09:30]  HBsAg Nonreact      [11-08-21 @ 15:44]  HCV 0.12, Nonreact      [11-08-21 @ 15:44]

## 2021-11-21 NOTE — PROGRESS NOTE ADULT - ASSESSMENT
57 yo M with PMH of DM, CAD s/p Stents, ESRD on HD sat/tu/th presented from [facility] for social issue.  As per NH pt became agitated and refused medications and dialysis. Per patient he had inadequate care at NH and wanted to go home with home care. Upon admission to our hospital, pt requesting admission to a different NH. Otherwise medically stable, receiving PT and HD here.     # ESRD on HD (TTS).   - calcium acetate 2 tab qac per nephro   - Venofer post HD   - Renal following   - Holding EPO     # History of b12 def  - cyanocobalamin 1000 MICROGram(s) Oral daily    # DM neuropathy  - gabapentin 300 milliGRAM(s) Oral every 12 hours    # Diabetes mellitus with hyperglycemia  - A1c: 5.7  - insulin glargine Injectable (LANTUS) 21 Unit(s) SubCutaneous at bedtime  - insulin lispro (ADMELOG) corrective regimen sliding scale   SubCutaneous three times a day before meals  - insulin lispro Injectable (ADMELOG) 7 Unit(s) SubCutaneous three times a day before meals  - Carbohydrate consistent diet     # Normocytic anemia, at goal for ESRD pt.  - Pt denies any bleeding symptoms. Replace as necessary.   - venofer as above  - DC'ed EPO per Nephro     # HAGMA (High AG Metabolic Acidosis)  - from uremic acidosis. On HD.    # Hx of CAD s/p Stents.  - atorvastatin 40 milliGRAM(s) Oral at bedtime  - clopidogrel Tablet 75 milliGRAM(s) Oral daily    # H/O DVT:   - per patient he was on coumadin for 15 years, recently switched to Eliquis. unsure why he is on it indefinitely(provoked vs unprovoked PE)- poor historian.  - apixaban 5 milliGRAM(s) Oral two times a day  - will need to follow up with PCP once discharged and continue conversation about ongoing need     # Elevated BP / Hypertension   - Started on Amlodipine   - If post-HD BP is elevated will increase Amlodipine     #Progress Note Handoff:  Pending (specify): Pending STR vs Home w/ HD   Family discussion: d/w pt       Jessie Narvaez DO

## 2021-11-21 NOTE — PROGRESS NOTE ADULT - SUBJECTIVE AND OBJECTIVE BOX
[facility]EL [facility]  56y, Male  Allergy: No Known Allergies    Hospital Day: 16d    Patient seen and examined earlier today. Feeling well, wants to go home instead of STR because he has a chair lift, but we need to confirm his dialysis chair prior to dc.     PMH/PSH:  PAST MEDICAL & SURGICAL HISTORY:  CAD (coronary artery disease)    DVT (deep venous thrombosis)    ESRD on dialysis    DM (diabetes mellitus)    H/O varicose vein stripping        LAST 24-Hr EVENTS:    VITALS:  T(F): 98 (11-21-21 @ 13:28), Max: 98 (11-21-21 @ 13:28)  HR: 100 (11-21-21 @ 13:28)  BP: 110/61 (11-21-21 @ 13:28) (110/61 - 118/60)  RR: 18 (11-21-21 @ 13:28)  SpO2: --        TESTS & MEASUREMENTS:  Weight (Kg):       11-19-21 @ 07:01  -  11-20-21 @ 07:00  --------------------------------------------------------  IN: 375 mL / OUT: 800 mL / NET: -425 mL    11-20-21 @ 07:01  -  11-21-21 @ 07:00  --------------------------------------------------------  IN: 480 mL / OUT: 3350 mL / NET: -2870 mL    11-21-21 @ 07:01  -  11-21-21 @ 18:25  --------------------------------------------------------  IN: 600 mL / OUT: 300 mL / NET: 300 mL                            9.3    6.04  )-----------( 176      ( 20 Nov 2021 04:30 )             29.7       11-20    135  |  101  |  84<HH>  ----------------------------<  237<H>  5.1<H>   |  16<L>  |  6.2<HH>    Ca    7.8<L>      20 Nov 2021 04:30  Mg     1.7     11-20                        COVID-19 PCR: NotDetec (11-19-21 @ 11:05)  COVID-19 PCR: NotDetec (11-18-21 @ 10:26)      RADIOLOGY, ECG, & ADDITIONAL TESTS:      RECENT DIAGNOSTIC ORDERS:      MEDICATIONS:  MEDICATIONS  (STANDING):  amLODIPine   Tablet 5 milliGRAM(s) Oral daily  apixaban 5 milliGRAM(s) Oral two times a day  atorvastatin 40 milliGRAM(s) Oral at bedtime  BACItracin   Ointment 1 Application(s) Topical two times a day  calcitriol   Capsule 0.5 MICROGram(s) Oral <User Schedule>  calcium acetate 1334 milliGRAM(s) Oral four times a day with meals  clopidogrel Tablet 75 milliGRAM(s) Oral daily  cyanocobalamin 1000 MICROGram(s) Oral daily  dextrose 40% Gel 15 Gram(s) Oral once  dextrose 5%. 1000 milliLiter(s) (50 mL/Hr) IV Continuous <Continuous>  dextrose 5%. 1000 milliLiter(s) (100 mL/Hr) IV Continuous <Continuous>  dextrose 50% Injectable 25 Gram(s) IV Push once  dextrose 50% Injectable 12.5 Gram(s) IV Push once  dextrose 50% Injectable 25 Gram(s) IV Push once  gabapentin 300 milliGRAM(s) Oral daily  glucagon  Injectable 1 milliGRAM(s) IntraMuscular once  influenza   Vaccine 0.5 milliLiter(s) IntraMuscular once  insulin glargine Injectable (LANTUS) 21 Unit(s) SubCutaneous at bedtime  insulin lispro (ADMELOG) corrective regimen sliding scale   SubCutaneous three times a day before meals  insulin lispro Injectable (ADMELOG) 7 Unit(s) SubCutaneous three times a day before meals  iron sucrose Injectable 100 milliGRAM(s) IV Push <User Schedule>  multivitamin 1 Tablet(s) Oral daily    MEDICATIONS  (PRN):  simethicone 80 milliGRAM(s) Chew four times a day PRN Dyspepsia      HOME MEDICATIONS:  Aranesp 40 mcg/0.4 mL injectable solution (11-05)  atorvastatin 40 mg oral tablet (11-05)  calcitriol 0.5 mcg oral capsule (11-05)  cyanocobalamin 100 mcg oral tablet (11-05)  Eliquis 5 mg oral tablet (11-05)  gabapentin 300 mg oral tablet (11-16)  Januvia 25 mg oral tablet (11-05)  Nephrocaps oral capsule (11-05)  Plavix 75 mg oral tablet (11-05)  simethicone 80 mg oral tablet, chewable (11-05)  Venofer 20 mg/mL intravenous solution (11-05)      PHYSICAL EXAM:  GENERAL:  NAD, No agitation   SKIN: No rashes or lesions  HEENT: Atraumatic. Normocephalic.  NECK:  No JVD.   PULMONARY: Clear to ausculation bilaterally.  CVS: Normal S1, S2.  ABDOMEN/GI: Soft, Nontender, Nondistended  EXTREMITIES:  No edema B/L LE.  NEUROLOGIC:  No motor deficit.  PSYCH: Alert & oriented x 3, normal affect  VASCULAR: Tessio in place

## 2021-11-22 LAB
ANION GAP SERPL CALC-SCNC: 18 MMOL/L — HIGH (ref 7–14)
ANION GAP SERPL CALC-SCNC: 18 MMOL/L — HIGH (ref 7–14)
BUN SERPL-MCNC: 73 MG/DL — CRITICAL HIGH (ref 10–20)
BUN SERPL-MCNC: 82 MG/DL — CRITICAL HIGH (ref 10–20)
CALCIUM SERPL-MCNC: 7.8 MG/DL — LOW (ref 8.5–10.1)
CALCIUM SERPL-MCNC: 8.1 MG/DL — LOW (ref 8.5–10.1)
CHLORIDE SERPL-SCNC: 101 MMOL/L — SIGNIFICANT CHANGE UP (ref 98–110)
CHLORIDE SERPL-SCNC: 98 MMOL/L — SIGNIFICANT CHANGE UP (ref 98–110)
CO2 SERPL-SCNC: 18 MMOL/L — SIGNIFICANT CHANGE UP (ref 17–32)
CO2 SERPL-SCNC: 18 MMOL/L — SIGNIFICANT CHANGE UP (ref 17–32)
CREAT SERPL-MCNC: 6.2 MG/DL — CRITICAL HIGH (ref 0.7–1.5)
CREAT SERPL-MCNC: 6.3 MG/DL — CRITICAL HIGH (ref 0.7–1.5)
GLUCOSE BLDC GLUCOMTR-MCNC: 130 MG/DL — HIGH (ref 70–99)
GLUCOSE BLDC GLUCOMTR-MCNC: 163 MG/DL — HIGH (ref 70–99)
GLUCOSE BLDC GLUCOMTR-MCNC: 247 MG/DL — HIGH (ref 70–99)
GLUCOSE SERPL-MCNC: 120 MG/DL — HIGH (ref 70–99)
GLUCOSE SERPL-MCNC: 155 MG/DL — HIGH (ref 70–99)
HCT VFR BLD CALC: 31.2 % — LOW (ref 42–52)
HGB BLD-MCNC: 9.7 G/DL — LOW (ref 14–18)
MAGNESIUM SERPL-MCNC: 1.7 MG/DL — LOW (ref 1.8–2.4)
MCHC RBC-ENTMCNC: 27.3 PG — SIGNIFICANT CHANGE UP (ref 27–31)
MCHC RBC-ENTMCNC: 31.1 G/DL — LOW (ref 32–37)
MCV RBC AUTO: 87.9 FL — SIGNIFICANT CHANGE UP (ref 80–94)
NRBC # BLD: 0 /100 WBCS — SIGNIFICANT CHANGE UP (ref 0–0)
PLATELET # BLD AUTO: 186 K/UL — SIGNIFICANT CHANGE UP (ref 130–400)
POTASSIUM SERPL-MCNC: 5 MMOL/L — SIGNIFICANT CHANGE UP (ref 3.5–5)
POTASSIUM SERPL-MCNC: 5.7 MMOL/L — HIGH (ref 3.5–5)
POTASSIUM SERPL-SCNC: 5 MMOL/L — SIGNIFICANT CHANGE UP (ref 3.5–5)
POTASSIUM SERPL-SCNC: 5.7 MMOL/L — HIGH (ref 3.5–5)
RBC # BLD: 3.55 M/UL — LOW (ref 4.7–6.1)
RBC # FLD: 13.8 % — SIGNIFICANT CHANGE UP (ref 11.5–14.5)
SODIUM SERPL-SCNC: 134 MMOL/L — LOW (ref 135–146)
SODIUM SERPL-SCNC: 137 MMOL/L — SIGNIFICANT CHANGE UP (ref 135–146)
WBC # BLD: 7 K/UL — SIGNIFICANT CHANGE UP (ref 4.8–10.8)
WBC # FLD AUTO: 7 K/UL — SIGNIFICANT CHANGE UP (ref 4.8–10.8)

## 2021-11-22 PROCEDURE — 99232 SBSQ HOSP IP/OBS MODERATE 35: CPT

## 2021-11-22 RX ORDER — CALCIUM ACETATE 667 MG
1 TABLET ORAL
Qty: 30 | Refills: 0
Start: 2021-11-22 | End: 2021-12-21

## 2021-11-22 RX ORDER — SODIUM ZIRCONIUM CYCLOSILICATE 10 G/10G
5 POWDER, FOR SUSPENSION ORAL ONCE
Refills: 0 | Status: COMPLETED | OUTPATIENT
Start: 2021-11-22 | End: 2021-11-22

## 2021-11-22 RX ORDER — MAGNESIUM SULFATE 500 MG/ML
2 VIAL (ML) INJECTION ONCE
Refills: 0 | Status: COMPLETED | OUTPATIENT
Start: 2021-11-22 | End: 2021-11-22

## 2021-11-22 RX ORDER — AMLODIPINE BESYLATE 2.5 MG/1
1 TABLET ORAL
Qty: 30 | Refills: 0
Start: 2021-11-22 | End: 2021-12-21

## 2021-11-22 RX ADMIN — Medication 3: at 19:53

## 2021-11-22 RX ADMIN — Medication 1 TABLET(S): at 11:17

## 2021-11-22 RX ADMIN — Medication 7 UNIT(S): at 19:54

## 2021-11-22 RX ADMIN — Medication 25 GRAM(S): at 11:39

## 2021-11-22 RX ADMIN — Medication 1334 MILLIGRAM(S): at 22:01

## 2021-11-22 RX ADMIN — Medication 1: at 11:18

## 2021-11-22 RX ADMIN — APIXABAN 5 MILLIGRAM(S): 2.5 TABLET, FILM COATED ORAL at 17:59

## 2021-11-22 RX ADMIN — Medication 1 APPLICATION(S): at 05:50

## 2021-11-22 RX ADMIN — AMLODIPINE BESYLATE 5 MILLIGRAM(S): 2.5 TABLET ORAL at 05:49

## 2021-11-22 RX ADMIN — ATORVASTATIN CALCIUM 40 MILLIGRAM(S): 80 TABLET, FILM COATED ORAL at 22:01

## 2021-11-22 RX ADMIN — Medication 7 UNIT(S): at 11:19

## 2021-11-22 RX ADMIN — GABAPENTIN 300 MILLIGRAM(S): 400 CAPSULE ORAL at 11:18

## 2021-11-22 RX ADMIN — PREGABALIN 1000 MICROGRAM(S): 225 CAPSULE ORAL at 11:18

## 2021-11-22 RX ADMIN — Medication 7 UNIT(S): at 08:02

## 2021-11-22 RX ADMIN — APIXABAN 5 MILLIGRAM(S): 2.5 TABLET, FILM COATED ORAL at 05:49

## 2021-11-22 RX ADMIN — INSULIN GLARGINE 21 UNIT(S): 100 INJECTION, SOLUTION SUBCUTANEOUS at 22:01

## 2021-11-22 RX ADMIN — Medication 1334 MILLIGRAM(S): at 19:53

## 2021-11-22 RX ADMIN — Medication 1334 MILLIGRAM(S): at 08:01

## 2021-11-22 RX ADMIN — SODIUM ZIRCONIUM CYCLOSILICATE 5 GRAM(S): 10 POWDER, FOR SUSPENSION ORAL at 11:38

## 2021-11-22 RX ADMIN — Medication 1 APPLICATION(S): at 17:59

## 2021-11-22 RX ADMIN — CLOPIDOGREL BISULFATE 75 MILLIGRAM(S): 75 TABLET, FILM COATED ORAL at 11:18

## 2021-11-22 RX ADMIN — Medication 1334 MILLIGRAM(S): at 11:22

## 2021-11-22 NOTE — PROGRESS NOTE ADULT - SUBJECTIVE AND OBJECTIVE BOX
[facility]EL [facility]  56y, Male  Allergy: No Known Allergies    Hospital Day: 17d    Patient seen and examined earlier today.  Pending dc, needs HD set up.     PMH/PSH:  PAST MEDICAL & SURGICAL HISTORY:  CAD (coronary artery disease)    DVT (deep venous thrombosis)    ESRD on dialysis    DM (diabetes mellitus)    H/O varicose vein stripping        LAST 24-Hr EVENTS:    VITALS:  T(F): 96.5 (11-22-21 @ 13:32), Max: 97.8 (11-21-21 @ 21:12)  HR: 88 (11-22-21 @ 13:32)  BP: 145/67 (11-22-21 @ 13:32) (129/63 - 175/79)  RR: 19 (11-22-21 @ 13:32)  SpO2: 103% (11-21-21 @ 21:12)        TESTS & MEASUREMENTS:  Weight (Kg):       11-20-21 @ 07:01  -  11-21-21 @ 07:00  --------------------------------------------------------  IN: 480 mL / OUT: 3350 mL / NET: -2870 mL    11-21-21 @ 07:01  -  11-22-21 @ 07:00  --------------------------------------------------------  IN: 600 mL / OUT: 300 mL / NET: 300 mL    11-22-21 @ 07:01  -  11-22-21 @ 19:28  --------------------------------------------------------  IN: 200 mL / OUT: 0 mL / NET: 200 mL                            9.7    7.00  )-----------( 186      ( 22 Nov 2021 07:04 )             31.2       11-22    137  |  101  |  73<HH>  ----------------------------<  120<H>  5.7<H>   |  18  |  6.2<HH>    Ca    8.1<L>      22 Nov 2021 07:04  Mg     1.7     11-22                        COVID-19 PCR: NotDetec (11-19-21 @ 11:05)  COVID-19 PCR: NotDetec (11-18-21 @ 10:26)      RADIOLOGY, ECG, & ADDITIONAL TESTS:      RECENT DIAGNOSTIC ORDERS:  Basic Metabolic Panel: 20:00 (11-22-21 @ 19:09)  Magnesium, Serum: AM Sched. Collection: 23-Nov-2021 04:30 (11-22-21 @ 13:52)  Phosphorus Level, Serum: AM Sched. Collection: 23-Nov-2021 04:30 (11-22-21 @ 13:52)  Comprehensive Metabolic Panel: AM Sched. Collection: 23-Nov-2021 04:30 (11-22-21 @ 13:52)  Complete Blood Count + Automated Diff: AM Sched. Collection: 23-Nov-2021 04:30 (11-22-21 @ 13:52)      MEDICATIONS:  MEDICATIONS  (STANDING):  amLODIPine   Tablet 5 milliGRAM(s) Oral daily  apixaban 5 milliGRAM(s) Oral two times a day  atorvastatin 40 milliGRAM(s) Oral at bedtime  BACItracin   Ointment 1 Application(s) Topical two times a day  calcitriol   Capsule 0.5 MICROGram(s) Oral <User Schedule>  calcium acetate 1334 milliGRAM(s) Oral four times a day with meals  clopidogrel Tablet 75 milliGRAM(s) Oral daily  cyanocobalamin 1000 MICROGram(s) Oral daily  dextrose 40% Gel 15 Gram(s) Oral once  dextrose 5%. 1000 milliLiter(s) (50 mL/Hr) IV Continuous <Continuous>  dextrose 5%. 1000 milliLiter(s) (100 mL/Hr) IV Continuous <Continuous>  dextrose 50% Injectable 25 Gram(s) IV Push once  dextrose 50% Injectable 12.5 Gram(s) IV Push once  dextrose 50% Injectable 25 Gram(s) IV Push once  gabapentin 300 milliGRAM(s) Oral daily  glucagon  Injectable 1 milliGRAM(s) IntraMuscular once  influenza   Vaccine 0.5 milliLiter(s) IntraMuscular once  insulin glargine Injectable (LANTUS) 21 Unit(s) SubCutaneous at bedtime  insulin lispro (ADMELOG) corrective regimen sliding scale   SubCutaneous three times a day before meals  insulin lispro Injectable (ADMELOG) 7 Unit(s) SubCutaneous three times a day before meals  iron sucrose Injectable 100 milliGRAM(s) IV Push <User Schedule>  multivitamin 1 Tablet(s) Oral daily    MEDICATIONS  (PRN):  simethicone 80 milliGRAM(s) Chew four times a day PRN Dyspepsia      HOME MEDICATIONS:  Aranesp 40 mcg/0.4 mL injectable solution (11-05)  atorvastatin 40 mg oral tablet (11-05)  calcitriol 0.5 mcg oral capsule (11-05)  cyanocobalamin 100 mcg oral tablet (11-05)  Eliquis 5 mg oral tablet (11-05)  gabapentin 300 mg oral tablet (11-16)  Januvia 25 mg oral tablet (11-05)  Nephrocaps oral capsule (11-05)  Plavix 75 mg oral tablet (11-05)  simethicone 80 mg oral tablet, chewable (11-05)  Venofer 20 mg/mL intravenous solution (11-05)      PHYSICAL EXAM:  GENERAL:  NAD, No agitation   SKIN: No rashes or lesions  HEENT: Atraumatic. Normocephalic.  NECK:  No JVD.   PULMONARY: Clear to ausculation bilaterally.  CVS: Normal S1, S2.  ABDOMEN/GI: Soft, Nontender, Nondistended  EXTREMITIES:  No edema B/L LE.  NEUROLOGIC:  No motor deficit.  PSYCH: Alert & oriented x 3, normal affect  VASCULAR: Tessio in place

## 2021-11-22 NOTE — PROGRESS NOTE ADULT - ASSESSMENT
57 yo M with PMH of DM, CAD s/p Stents, ESRD on HD sat/tu/th presented from [facility] for social issue.  As per NH pt became agitated and refused medications and dialysis. Per patient he had inadequate care at NH and wanted to go home with home care. Upon admission to our hospital, pt requesting admission to a different NH. Otherwise medically stable, receiving PT and HD here.     # ESRD on HD (TTS).   - calcium acetate 2 tab qac per nephro   - Venofer post HD   - Renal following   - Holding EPO     # History of b12 def  - cyanocobalamin 1000 MICROGram(s) Oral daily    # DM neuropathy  - gabapentin 300 milliGRAM(s) Oral every 12 hours    # Diabetes mellitus with hyperglycemia  - A1c: 5.7  - insulin glargine Injectable (LANTUS) 21 Unit(s) SubCutaneous at bedtime  - insulin lispro (ADMELOG) corrective regimen sliding scale   SubCutaneous three times a day before meals  - insulin lispro Injectable (ADMELOG) 7 Unit(s) SubCutaneous three times a day before meals  - Carbohydrate consistent diet     # Normocytic anemia, at goal for ESRD pt.  - Pt denies any bleeding symptoms. Replace as necessary.   - venofer as above  - DC'ed EPO per Nephro     # HAGMA (High AG Metabolic Acidosis)  - from uremic acidosis. On HD.    # Hx of CAD s/p Stents.  - atorvastatin 40 milliGRAM(s) Oral at bedtime  - clopidogrel Tablet 75 milliGRAM(s) Oral daily    # H/O DVT:   - per patient he was on coumadin for 15 years, recently switched to Eliquis. unsure why he is on it indefinitely(provoked vs unprovoked PE)- poor historian.  - apixaban 5 milliGRAM(s) Oral two times a day  - will need to follow up with PCP once discharged and continue conversation about ongoing need     # Elevated BP / Hypertension   - Started on Amlodipine   - If post-HD BP is elevated will increase Amlodipine     #Progress Note Handoff:  Pending (specify): Pending STR vs Home w/ HD   Family discussion: d/w pt       Jessie Narvaez DO

## 2021-11-22 NOTE — PROGRESS NOTE ADULT - SUBJECTIVE AND OBJECTIVE BOX
Nephrology progress note    THIS IS AN INCOMPLETE NOTE . FULL NOTE TO FOLLOW SHORTLY    Patient is seen and examined, events over the last 24 h noted .    Allergies:  No Known Allergies    Hospital Medications:   MEDICATIONS  (STANDING):  amLODIPine   Tablet 5 milliGRAM(s) Oral daily  apixaban 5 milliGRAM(s) Oral two times a day  atorvastatin 40 milliGRAM(s) Oral at bedtime  BACItracin   Ointment 1 Application(s) Topical two times a day  calcitriol   Capsule 0.5 MICROGram(s) Oral <User Schedule>  calcium acetate 1334 milliGRAM(s) Oral four times a day with meals  clopidogrel Tablet 75 milliGRAM(s) Oral daily  cyanocobalamin 1000 MICROGram(s) Oral daily  dextrose 40% Gel 15 Gram(s) Oral once  dextrose 5%. 1000 milliLiter(s) (50 mL/Hr) IV Continuous <Continuous>  dextrose 5%. 1000 milliLiter(s) (100 mL/Hr) IV Continuous <Continuous>  dextrose 50% Injectable 25 Gram(s) IV Push once  dextrose 50% Injectable 12.5 Gram(s) IV Push once  dextrose 50% Injectable 25 Gram(s) IV Push once  gabapentin 300 milliGRAM(s) Oral daily  glucagon  Injectable 1 milliGRAM(s) IntraMuscular once  influenza   Vaccine 0.5 milliLiter(s) IntraMuscular once  insulin glargine Injectable (LANTUS) 21 Unit(s) SubCutaneous at bedtime  insulin lispro (ADMELOG) corrective regimen sliding scale   SubCutaneous three times a day before meals  insulin lispro Injectable (ADMELOG) 7 Unit(s) SubCutaneous three times a day before meals  iron sucrose Injectable 100 milliGRAM(s) IV Push <User Schedule>  multivitamin 1 Tablet(s) Oral daily        VITALS:  T(F): 97 (11-22-21 @ 04:56), Max: 98 (11-21-21 @ 13:28)  HR: 92 (11-22-21 @ 04:56)  BP: 129/63 (11-22-21 @ 04:56)  RR: 18 (11-22-21 @ 04:56)  SpO2: 103% (11-21-21 @ 21:12)  Wt(kg): --    11-20 @ 07:01  -  11-21 @ 07:00  --------------------------------------------------------  IN: 480 mL / OUT: 3350 mL / NET: -2870 mL    11-21 @ 07:01  - 11-22 @ 07:00  --------------------------------------------------------  IN: 600 mL / OUT: 300 mL / NET: 300 mL          PHYSICAL EXAM:  Constitutional: NAD  HEENT: anicteric sclera, oropharynx clear, MMM  Neck: No JVD  Respiratory: CTAB, no wheezes, rales or rhonchi  Cardiovascular: S1, S2, RRR  Gastrointestinal: BS+, soft, NT/ND  Extremities: No cyanosis or clubbing. No peripheral edema  :  No callahan.   Skin: No rashes    LABS:            Urine Studies:      RADIOLOGY & ADDITIONAL STUDIES:   Nephrology progress note  Patient is seen and examined, events over the last 24 h noted .  lying in bed comfortable     Allergies:  No Known Allergies    Hospital Medications:   MEDICATIONS  (STANDING):    amLODIPine   Tablet 5 milliGRAM(s) Oral daily  apixaban 5 milliGRAM(s) Oral two times a day  atorvastatin 40 milliGRAM(s) Oral at bedtime  BACItracin   Ointment 1 Application(s) Topical two times a day  calcitriol   Capsule 0.5 MICROGram(s) Oral <User Schedule>  calcium acetate 1334 milliGRAM(s) Oral four times a day with meals  clopidogrel Tablet 75 milliGRAM(s) Oral daily  cyanocobalamin 1000 MICROGram(s) Oral daily  gabapentin 300 milliGRAM(s) Oral daily  glucagon  Injectable 1 milliGRAM(s) IntraMuscular once  influenza   Vaccine 0.5 milliLiter(s) IntraMuscular once  insulin glargine Injectable (LANTUS) 21 Unit(s) SubCutaneous at bedtime  insulin lispro (ADMELOG) corrective regimen sliding scale   SubCutaneous three times a day before meals  insulin lispro Injectable (ADMELOG) 7 Unit(s) SubCutaneous three times a day before meals  iron sucrose Injectable 100 milliGRAM(s) IV Push <User Schedule>  multivitamin 1 Tablet(s) Oral daily        VITALS:  T(F): 97 (11-22-21 @ 04:56), Max: 98 (11-21-21 @ 13:28)  HR: 92 (11-22-21 @ 04:56)  BP: 129/63 (11-22-21 @ 04:56)  RR: 18 (11-22-21 @ 04:56)  SpO2: 103% (11-21-21 @ 21:12)    11-20 @ 07:01  -  11-21 @ 07:00  --------------------------------------------------------  IN: 480 mL / OUT: 3350 mL / NET: -2870 mL    11-21 @ 07:01  -  11-22 @ 07:00  --------------------------------------------------------  IN: 600 mL / OUT: 300 mL / NET: 300 mL          PHYSICAL EXAM:  Constitutional: NAD  Neck: No JVD  Respiratory: CTAB,   Cardiovascular: S1, S2, RRR  Gastrointestinal: BS+, soft, NT/ND  Extremities: No cyanosis or clubbing. No peripheral edema  :  No callahan.   Skin: No rashes    LABS:            Urine Studies:      RADIOLOGY & ADDITIONAL STUDIES:

## 2021-11-22 NOTE — PROGRESS NOTE ADULT - ASSESSMENT
55 yo M with PMH of DM, CAD s/p Stents, ESRD on HD  at [facility] presented from [facility] for eval of agitation. As per NH pt became agitated and refused medications.  Patient stated that he does not want to go back to [facility] and he wants to do HHD     # ESRD on HD TTS  # MBD  # Normocytic anemia   # HTN    - for HD in AM   - on venofer, hgb noted,  check iron stores   - last ph level noted, on binders   - low k diet   - BP better controlled this morning   - will follow     DC planning?

## 2021-11-23 LAB
ALBUMIN SERPL ELPH-MCNC: 3.2 G/DL — LOW (ref 3.5–5.2)
ALP SERPL-CCNC: 153 U/L — HIGH (ref 30–115)
ALT FLD-CCNC: 18 U/L — SIGNIFICANT CHANGE UP (ref 0–41)
ANION GAP SERPL CALC-SCNC: 20 MMOL/L — HIGH (ref 7–14)
AST SERPL-CCNC: 13 U/L — SIGNIFICANT CHANGE UP (ref 0–41)
BASOPHILS # BLD AUTO: 0.05 K/UL — SIGNIFICANT CHANGE UP (ref 0–0.2)
BASOPHILS NFR BLD AUTO: 0.7 % — SIGNIFICANT CHANGE UP (ref 0–1)
BILIRUB SERPL-MCNC: 0.2 MG/DL — SIGNIFICANT CHANGE UP (ref 0.2–1.2)
BUN SERPL-MCNC: 87 MG/DL — CRITICAL HIGH (ref 10–20)
CALCIUM SERPL-MCNC: 8 MG/DL — LOW (ref 8.5–10.1)
CHLORIDE SERPL-SCNC: 99 MMOL/L — SIGNIFICANT CHANGE UP (ref 98–110)
CO2 SERPL-SCNC: 17 MMOL/L — SIGNIFICANT CHANGE UP (ref 17–32)
CREAT SERPL-MCNC: 7.2 MG/DL — CRITICAL HIGH (ref 0.7–1.5)
EOSINOPHIL # BLD AUTO: 0.54 K/UL — SIGNIFICANT CHANGE UP (ref 0–0.7)
EOSINOPHIL NFR BLD AUTO: 7.7 % — SIGNIFICANT CHANGE UP (ref 0–8)
GLUCOSE BLDC GLUCOMTR-MCNC: 107 MG/DL — HIGH (ref 70–99)
GLUCOSE BLDC GLUCOMTR-MCNC: 118 MG/DL — HIGH (ref 70–99)
GLUCOSE BLDC GLUCOMTR-MCNC: 186 MG/DL — HIGH (ref 70–99)
GLUCOSE BLDC GLUCOMTR-MCNC: 198 MG/DL — HIGH (ref 70–99)
GLUCOSE SERPL-MCNC: 98 MG/DL — SIGNIFICANT CHANGE UP (ref 70–99)
HCT VFR BLD CALC: 30.1 % — LOW (ref 42–52)
HGB BLD-MCNC: 9.5 G/DL — LOW (ref 14–18)
IMM GRANULOCYTES NFR BLD AUTO: 0.3 % — SIGNIFICANT CHANGE UP (ref 0.1–0.3)
LYMPHOCYTES # BLD AUTO: 1.51 K/UL — SIGNIFICANT CHANGE UP (ref 1.2–3.4)
LYMPHOCYTES # BLD AUTO: 21.4 % — SIGNIFICANT CHANGE UP (ref 20.5–51.1)
MAGNESIUM SERPL-MCNC: 2.1 MG/DL — SIGNIFICANT CHANGE UP (ref 1.8–2.4)
MCHC RBC-ENTMCNC: 27.9 PG — SIGNIFICANT CHANGE UP (ref 27–31)
MCHC RBC-ENTMCNC: 31.6 G/DL — LOW (ref 32–37)
MCV RBC AUTO: 88.5 FL — SIGNIFICANT CHANGE UP (ref 80–94)
MONOCYTES # BLD AUTO: 0.47 K/UL — SIGNIFICANT CHANGE UP (ref 0.1–0.6)
MONOCYTES NFR BLD AUTO: 6.7 % — SIGNIFICANT CHANGE UP (ref 1.7–9.3)
NEUTROPHILS # BLD AUTO: 4.46 K/UL — SIGNIFICANT CHANGE UP (ref 1.4–6.5)
NEUTROPHILS NFR BLD AUTO: 63.2 % — SIGNIFICANT CHANGE UP (ref 42.2–75.2)
NRBC # BLD: 0 /100 WBCS — SIGNIFICANT CHANGE UP (ref 0–0)
PHOSPHATE SERPL-MCNC: 6.7 MG/DL — HIGH (ref 2.1–4.9)
PLATELET # BLD AUTO: 198 K/UL — SIGNIFICANT CHANGE UP (ref 130–400)
POTASSIUM SERPL-MCNC: 6 MMOL/L — CRITICAL HIGH (ref 3.5–5)
POTASSIUM SERPL-SCNC: 6 MMOL/L — CRITICAL HIGH (ref 3.5–5)
PROT SERPL-MCNC: 5.9 G/DL — LOW (ref 6–8)
RBC # BLD: 3.4 M/UL — LOW (ref 4.7–6.1)
RBC # FLD: 13.7 % — SIGNIFICANT CHANGE UP (ref 11.5–14.5)
SODIUM SERPL-SCNC: 136 MMOL/L — SIGNIFICANT CHANGE UP (ref 135–146)
WBC # BLD: 7.05 K/UL — SIGNIFICANT CHANGE UP (ref 4.8–10.8)
WBC # FLD AUTO: 7.05 K/UL — SIGNIFICANT CHANGE UP (ref 4.8–10.8)

## 2021-11-23 PROCEDURE — 99232 SBSQ HOSP IP/OBS MODERATE 35: CPT

## 2021-11-23 RX ORDER — SODIUM ZIRCONIUM CYCLOSILICATE 10 G/10G
10 POWDER, FOR SUSPENSION ORAL ONCE
Refills: 0 | Status: COMPLETED | OUTPATIENT
Start: 2021-11-23 | End: 2021-11-23

## 2021-11-23 RX ADMIN — Medication 1: at 17:31

## 2021-11-23 RX ADMIN — Medication 7 UNIT(S): at 14:44

## 2021-11-23 RX ADMIN — Medication 7 UNIT(S): at 08:05

## 2021-11-23 RX ADMIN — Medication 1334 MILLIGRAM(S): at 17:33

## 2021-11-23 RX ADMIN — INSULIN GLARGINE 21 UNIT(S): 100 INJECTION, SOLUTION SUBCUTANEOUS at 21:32

## 2021-11-23 RX ADMIN — Medication 1334 MILLIGRAM(S): at 21:31

## 2021-11-23 RX ADMIN — PREGABALIN 1000 MICROGRAM(S): 225 CAPSULE ORAL at 14:17

## 2021-11-23 RX ADMIN — Medication 1 APPLICATION(S): at 18:16

## 2021-11-23 RX ADMIN — Medication 1334 MILLIGRAM(S): at 14:17

## 2021-11-23 RX ADMIN — Medication 1: at 14:43

## 2021-11-23 RX ADMIN — IRON SUCROSE 100 MILLIGRAM(S): 20 INJECTION, SOLUTION INTRAVENOUS at 12:16

## 2021-11-23 RX ADMIN — AMLODIPINE BESYLATE 5 MILLIGRAM(S): 2.5 TABLET ORAL at 06:30

## 2021-11-23 RX ADMIN — Medication 1334 MILLIGRAM(S): at 08:06

## 2021-11-23 RX ADMIN — GABAPENTIN 300 MILLIGRAM(S): 400 CAPSULE ORAL at 14:20

## 2021-11-23 RX ADMIN — CLOPIDOGREL BISULFATE 75 MILLIGRAM(S): 75 TABLET, FILM COATED ORAL at 14:15

## 2021-11-23 RX ADMIN — Medication 1 TABLET(S): at 14:14

## 2021-11-23 RX ADMIN — APIXABAN 5 MILLIGRAM(S): 2.5 TABLET, FILM COATED ORAL at 17:32

## 2021-11-23 RX ADMIN — Medication 1 APPLICATION(S): at 06:31

## 2021-11-23 RX ADMIN — CALCITRIOL 0.5 MICROGRAM(S): 0.5 CAPSULE ORAL at 14:16

## 2021-11-23 RX ADMIN — APIXABAN 5 MILLIGRAM(S): 2.5 TABLET, FILM COATED ORAL at 06:29

## 2021-11-23 RX ADMIN — SODIUM ZIRCONIUM CYCLOSILICATE 10 GRAM(S): 10 POWDER, FOR SUSPENSION ORAL at 17:33

## 2021-11-23 RX ADMIN — Medication 7 UNIT(S): at 17:32

## 2021-11-23 RX ADMIN — ATORVASTATIN CALCIUM 40 MILLIGRAM(S): 80 TABLET, FILM COATED ORAL at 21:31

## 2021-11-23 NOTE — PROGRESS NOTE ADULT - ASSESSMENT
Assessment and Plan  Case of a 56 year old male patient with ESRD on HD and other comorbidities who was brought from [place] on 11/05 for agitation and lack of compliance with HD, admitted for placement issues and for HD resumption. Currently hemodynamically stable.      ESRD   HAGMA (High AG Metabolic Acidosis)  Normocytic anemia  * Started on HD every Tuesday, Thursday, and Saturday around 7 weeks ago after a recent [facility] admission  * Normocytic Anemia At goal for ESRD patient  * No iron studies in chart    - Nephrology Team on board  - Follow up HD plan every Tuesday, Thursday, and Saturday.  on board for outpatient spot since [facility] HD center and another center in [place] seem to have no spots for the patient.  - Trend CMP + P on a daily basis  - Trend AG + CO2: 11/23 AG 20, CO2 17  - Continue calcium acetate 2 tablets QAC   - Continue Venofer post HD   - Continue Holding EPO for now      DM   Diabetic neuropathy  * HbA1c: 5.7 11/06/21  - Monitor POCT premeals and at bedtime: 11/22 120, 155, 130, 163, 247  - Continue Lantus 21 units at bedtime  - Continue Lispro 7 units TID  - Continue Apidra Sliding Scale A  - Continue gabapentin 300 milliGRAM(s) Oral every 12 hours      History of Vitamin b12 deficiency  * No Vitamin B12 level in chart  - Trend CBC daily  - Continue cyanocobalamin 1000 MICROGram(s) Oral daily      CAD   * s/p Stents  * No troponin sent during this admission  * No lipid profile in chart  - Continue Atorvastatin 40 milliGRAM(s) Oral at bedtime  - Continue clopidogrel Tablet 75 milliGRAM(s) Oral daily      History of DVT  * Diagnosed > 15 eyars ago  * Per patient, he was on coumadin for 15 years --> recently switched to Eliquis (unsure why he is on it indefinitely or whether it is provoked or unprovoked)  * No [facility] Duplex Venous LE in chart  - Conitnue apixaban 5 milliGRAM(s) Oral two times a day  - Will ask to follow up with PCP once discharged and continue conversation about ongoing need       Hypertension   - Monitor BP closely: 11/23 138/67 mmHg  - Continue Amlodipine 5mg QD      Others  - DVT Prophylaxis: as detailed above  - GI Prophylaxis: not on Pantoprazole 40mg PO QD  - Diet: Renal Restrictions  - Code Status: Full      Barriers to learning: NO  Discharge Planning: Patient will be discharged once stable   Plan was communicated with patient and medical team      Ashley Del Rio MD  PGY - 2 Internal Medicine   [facility]

## 2021-11-23 NOTE — PROGRESS NOTE ADULT - ASSESSMENT
57 yo M with PMH of DM, CAD s/p Stents, ESRD on HD  at [facility] presented from [facility] for eval of agitation. As per NH pt became agitated and refused medications.  Patient stated that he does not want to go back to [facility] and he wants to do HHD     # ESRD on HD TTS  # MBD  # Normocytic anemia   # HTN    - for HD today standard bath , uf 2 liters as tolerated   - on venofer, hgb noted,  check iron stores   - last ph level noted, on binders   - low k diet   - BP at goal   - will follow     DC planning?

## 2021-11-23 NOTE — PROGRESS NOTE ADULT - SUBJECTIVE AND OBJECTIVE BOX
Location: [address] ([address])  Patient Name: ALEXANDRO MONROE  Age: 56y  Gender: Male      Progress Note  This morning patient was seen and examined at bedside.    Today is hospital day 18d.  Mr. Monroe is doing fine.   He reports he had a good night sleep.   His appetite is adequate, and he denies nausea or vomiting.   He denies any abdominal pain, diarrhea, or constipation. His last bowel movement was soft and was on 11/22.   He is ambulating with a walker and denies any shortness of breath, chest pain, palpitations, or light headedness.   He has no callahan catheter and is on HD.      Vital Signs in the last 24 hours   Vitals Summary T(C): 35.8 (11-23-21 @ 05:00), Max: 36.6 (11-22-21 @ 21:29)  HR: 80 (11-23-21 @ 12:09) (80 - 95)  BP: 124/71 (11-23-21 @ 12:09) (124/71 - 170/81)  RR: 18 (11-23-21 @ 12:09) (18 - 18)  SpO2: --  Vent Data   Intake/ Output   11-22-21 @ 07:01  -  11-23-21 @ 07:00  --------------------------------------------------------  IN: 200 mL / OUT: 450 mL / NET: -250 mL    11-23-21 @ 07:01  -  11-23-21 @ 16:09  --------------------------------------------------------  IN: 0 mL / OUT: 2000 mL / NET: -2000 mL      Physical Exam  * General Appearance: Alert, cooperative, interactive, well-groomed, oriented to time, place, and person, in no acute distress  * Head: Normocephalic, without obvious abnormality, atraumatic  * Thyroid:  No enlargement/tenderness/nodules; no carotid bruit or JVD  * Back: Symmetric, no curvature, ROM normal, no CVA tenderness  * Lungs: Respirations unlabored, Good bilateral air entry, normal breath sounds (Clear to auscultation bilaterally, no audible wheezes, crackles, or rhonchi)  * Heart: Regular Rate and Rhythm, normal S1 and S2, no audible murmur, rub, or gallop  * Abdomen: Symmetric, non-distended, no scar, soft, non-tender, bowel sounds active all four quadrants, no masses, no organomegaly (no hepatosplenomegaly)  * Extremities: Extremities normal, atraumatic, no cyanosis, no lower extremity pitting edema bilaterally, adequate dorsalis pedis pulses  * Pulses: 2+ and symmetric all extremities  * Skin: Skin color, texture, turgor normal, no rashes or lesions  * Lymph nodes: Cervical, supraclavicular, and axillary nodes normal  * Neurologic: CNII-XII intact, normal strength, sensation and reflexes throughout      Investigations   Laboratory Workup  - CBC:                        9.5    7.05  )-----------( 198      ( 23 Nov 2021 07:36 )             30.1     - Chemistry:  11-23    136  |  99  |  87<HH>  ----------------------------<  98  6.0<HH>   |  17  |  7.2<HH>    Ca    8.0<L>      23 Nov 2021 07:36  Phos  6.7     11-23  Mg     2.1     11-23    TPro  5.9<L>  /  Alb  3.2<L>  /  TBili  0.2  /  DBili  x   /  AST  13  /  ALT  18  /  AlkPhos  153<H>  11-23      Current Medications  Standing Medications  amLODIPine   Tablet 5 milliGRAM(s) Oral daily  apixaban 5 milliGRAM(s) Oral two times a day  atorvastatin 40 milliGRAM(s) Oral at bedtime  BACItracin   Ointment 1 Application(s) Topical two times a day  calcitriol   Capsule 0.5 MICROGram(s) Oral <User Schedule>  calcium acetate 1334 milliGRAM(s) Oral four times a day with meals  clopidogrel Tablet 75 milliGRAM(s) Oral daily  cyanocobalamin 1000 MICROGram(s) Oral daily  dextrose 40% Gel 15 Gram(s) Oral once  dextrose 5%. 1000 milliLiter(s) (50 mL/Hr) IV Continuous <Continuous>  dextrose 5%. 1000 milliLiter(s) (100 mL/Hr) IV Continuous <Continuous>  dextrose 50% Injectable 25 Gram(s) IV Push once  dextrose 50% Injectable 12.5 Gram(s) IV Push once  dextrose 50% Injectable 25 Gram(s) IV Push once  gabapentin 300 milliGRAM(s) Oral daily  glucagon  Injectable 1 milliGRAM(s) IntraMuscular once  influenza   Vaccine 0.5 milliLiter(s) IntraMuscular once  insulin glargine Injectable (LANTUS) 21 Unit(s) SubCutaneous at bedtime  insulin lispro (ADMELOG) corrective regimen sliding scale   SubCutaneous three times a day before meals  insulin lispro Injectable (ADMELOG) 7 Unit(s) SubCutaneous three times a day before meals  iron sucrose Injectable 100 milliGRAM(s) IV Push <User Schedule>  multivitamin 1 Tablet(s) Oral daily  sodium zirconium cyclosilicate 10 Gram(s) Oral once    PRN Medications  simethicone 80 milliGRAM(s) Chew four times a day PRN Dyspepsia    Singles Doses Administered  labetalol 100 milliGRAM(s) Oral once  loperamide 2 milliGRAM(s) Oral once PRN  magnesium sulfate  IVPB 2 Gram(s) IV Intermittent once  magnesium sulfate  IVPB 2 Gram(s) IV Intermittent once  sodium zirconium cyclosilicate 5 Gram(s) Oral once

## 2021-11-24 LAB
ALBUMIN SERPL ELPH-MCNC: 3 G/DL — LOW (ref 3.5–5.2)
ALBUMIN SERPL ELPH-MCNC: 3.2 G/DL — LOW (ref 3.5–5.2)
ALP SERPL-CCNC: 154 U/L — HIGH (ref 30–115)
ALP SERPL-CCNC: 163 U/L — HIGH (ref 30–115)
ALT FLD-CCNC: 17 U/L — SIGNIFICANT CHANGE UP (ref 0–41)
ALT FLD-CCNC: 19 U/L — SIGNIFICANT CHANGE UP (ref 0–41)
ANION GAP SERPL CALC-SCNC: 16 MMOL/L — HIGH (ref 7–14)
ANION GAP SERPL CALC-SCNC: 17 MMOL/L — HIGH (ref 7–14)
AST SERPL-CCNC: 16 U/L — SIGNIFICANT CHANGE UP (ref 0–41)
AST SERPL-CCNC: 17 U/L — SIGNIFICANT CHANGE UP (ref 0–41)
BASOPHILS # BLD AUTO: 0.05 K/UL — SIGNIFICANT CHANGE UP (ref 0–0.2)
BASOPHILS NFR BLD AUTO: 0.9 % — SIGNIFICANT CHANGE UP (ref 0–1)
BILIRUB SERPL-MCNC: <0.2 MG/DL — SIGNIFICANT CHANGE UP (ref 0.2–1.2)
BILIRUB SERPL-MCNC: <0.2 MG/DL — SIGNIFICANT CHANGE UP (ref 0.2–1.2)
BUN SERPL-MCNC: 57 MG/DL — HIGH (ref 10–20)
BUN SERPL-MCNC: 61 MG/DL — CRITICAL HIGH (ref 10–20)
CALCIUM SERPL-MCNC: 8 MG/DL — LOW (ref 8.5–10.1)
CALCIUM SERPL-MCNC: 8.2 MG/DL — LOW (ref 8.5–10.1)
CHLORIDE SERPL-SCNC: 100 MMOL/L — SIGNIFICANT CHANGE UP (ref 98–110)
CHLORIDE SERPL-SCNC: 100 MMOL/L — SIGNIFICANT CHANGE UP (ref 98–110)
CO2 SERPL-SCNC: 19 MMOL/L — SIGNIFICANT CHANGE UP (ref 17–32)
CO2 SERPL-SCNC: 20 MMOL/L — SIGNIFICANT CHANGE UP (ref 17–32)
CREAT SERPL-MCNC: 4.9 MG/DL — CRITICAL HIGH (ref 0.7–1.5)
CREAT SERPL-MCNC: 5.3 MG/DL — CRITICAL HIGH (ref 0.7–1.5)
EOSINOPHIL # BLD AUTO: 0.43 K/UL — SIGNIFICANT CHANGE UP (ref 0–0.7)
EOSINOPHIL NFR BLD AUTO: 7.6 % — SIGNIFICANT CHANGE UP (ref 0–8)
GLUCOSE BLDC GLUCOMTR-MCNC: 128 MG/DL — HIGH (ref 70–99)
GLUCOSE BLDC GLUCOMTR-MCNC: 129 MG/DL — HIGH (ref 70–99)
GLUCOSE BLDC GLUCOMTR-MCNC: 148 MG/DL — HIGH (ref 70–99)
GLUCOSE BLDC GLUCOMTR-MCNC: 156 MG/DL — HIGH (ref 70–99)
GLUCOSE SERPL-MCNC: 160 MG/DL — HIGH (ref 70–99)
GLUCOSE SERPL-MCNC: 186 MG/DL — HIGH (ref 70–99)
HCT VFR BLD CALC: 30.6 % — LOW (ref 42–52)
HGB BLD-MCNC: 9.8 G/DL — LOW (ref 14–18)
IMM GRANULOCYTES NFR BLD AUTO: 0.4 % — HIGH (ref 0.1–0.3)
LYMPHOCYTES # BLD AUTO: 1.28 K/UL — SIGNIFICANT CHANGE UP (ref 1.2–3.4)
LYMPHOCYTES # BLD AUTO: 22.6 % — SIGNIFICANT CHANGE UP (ref 20.5–51.1)
MAGNESIUM SERPL-MCNC: 2 MG/DL — SIGNIFICANT CHANGE UP (ref 1.8–2.4)
MCHC RBC-ENTMCNC: 28.3 PG — SIGNIFICANT CHANGE UP (ref 27–31)
MCHC RBC-ENTMCNC: 32 G/DL — SIGNIFICANT CHANGE UP (ref 32–37)
MCV RBC AUTO: 88.4 FL — SIGNIFICANT CHANGE UP (ref 80–94)
MONOCYTES # BLD AUTO: 0.48 K/UL — SIGNIFICANT CHANGE UP (ref 0.1–0.6)
MONOCYTES NFR BLD AUTO: 8.5 % — SIGNIFICANT CHANGE UP (ref 1.7–9.3)
NEUTROPHILS # BLD AUTO: 3.4 K/UL — SIGNIFICANT CHANGE UP (ref 1.4–6.5)
NEUTROPHILS NFR BLD AUTO: 60 % — SIGNIFICANT CHANGE UP (ref 42.2–75.2)
NRBC # BLD: 0 /100 WBCS — SIGNIFICANT CHANGE UP (ref 0–0)
PHOSPHATE SERPL-MCNC: 5.9 MG/DL — HIGH (ref 2.1–4.9)
PLATELET # BLD AUTO: 193 K/UL — SIGNIFICANT CHANGE UP (ref 130–400)
POTASSIUM SERPL-MCNC: 4.3 MMOL/L — SIGNIFICANT CHANGE UP (ref 3.5–5)
POTASSIUM SERPL-MCNC: 4.8 MMOL/L — SIGNIFICANT CHANGE UP (ref 3.5–5)
POTASSIUM SERPL-SCNC: 4.3 MMOL/L — SIGNIFICANT CHANGE UP (ref 3.5–5)
POTASSIUM SERPL-SCNC: 4.8 MMOL/L — SIGNIFICANT CHANGE UP (ref 3.5–5)
PROT SERPL-MCNC: 5.6 G/DL — LOW (ref 6–8)
PROT SERPL-MCNC: 6 G/DL — SIGNIFICANT CHANGE UP (ref 6–8)
RBC # BLD: 3.46 M/UL — LOW (ref 4.7–6.1)
RBC # FLD: 13.8 % — SIGNIFICANT CHANGE UP (ref 11.5–14.5)
SODIUM SERPL-SCNC: 136 MMOL/L — SIGNIFICANT CHANGE UP (ref 135–146)
SODIUM SERPL-SCNC: 136 MMOL/L — SIGNIFICANT CHANGE UP (ref 135–146)
WBC # BLD: 5.66 K/UL — SIGNIFICANT CHANGE UP (ref 4.8–10.8)
WBC # FLD AUTO: 5.66 K/UL — SIGNIFICANT CHANGE UP (ref 4.8–10.8)

## 2021-11-24 PROCEDURE — 99232 SBSQ HOSP IP/OBS MODERATE 35: CPT

## 2021-11-24 RX ORDER — CALCIUM ACETATE 667 MG
2001 TABLET ORAL
Refills: 0 | Status: DISCONTINUED | OUTPATIENT
Start: 2021-11-24 | End: 2021-11-26

## 2021-11-24 RX ADMIN — INSULIN GLARGINE 21 UNIT(S): 100 INJECTION, SOLUTION SUBCUTANEOUS at 21:42

## 2021-11-24 RX ADMIN — Medication 7 UNIT(S): at 08:22

## 2021-11-24 RX ADMIN — ATORVASTATIN CALCIUM 40 MILLIGRAM(S): 80 TABLET, FILM COATED ORAL at 21:43

## 2021-11-24 RX ADMIN — Medication 2001 MILLIGRAM(S): at 17:38

## 2021-11-24 RX ADMIN — Medication 1 APPLICATION(S): at 05:47

## 2021-11-24 RX ADMIN — APIXABAN 5 MILLIGRAM(S): 2.5 TABLET, FILM COATED ORAL at 17:37

## 2021-11-24 RX ADMIN — PREGABALIN 1000 MICROGRAM(S): 225 CAPSULE ORAL at 11:37

## 2021-11-24 RX ADMIN — GABAPENTIN 300 MILLIGRAM(S): 400 CAPSULE ORAL at 11:36

## 2021-11-24 RX ADMIN — CLOPIDOGREL BISULFATE 75 MILLIGRAM(S): 75 TABLET, FILM COATED ORAL at 11:36

## 2021-11-24 RX ADMIN — Medication 1 APPLICATION(S): at 17:37

## 2021-11-24 RX ADMIN — Medication 7 UNIT(S): at 11:35

## 2021-11-24 RX ADMIN — Medication 1 TABLET(S): at 11:36

## 2021-11-24 RX ADMIN — Medication 7 UNIT(S): at 17:35

## 2021-11-24 RX ADMIN — Medication 1334 MILLIGRAM(S): at 08:22

## 2021-11-24 RX ADMIN — Medication 1334 MILLIGRAM(S): at 11:36

## 2021-11-24 RX ADMIN — AMLODIPINE BESYLATE 5 MILLIGRAM(S): 2.5 TABLET ORAL at 05:46

## 2021-11-24 RX ADMIN — APIXABAN 5 MILLIGRAM(S): 2.5 TABLET, FILM COATED ORAL at 05:46

## 2021-11-24 NOTE — PROGRESS NOTE ADULT - ASSESSMENT
Assessment and Plan  Case of a 56 year old male patient with ESRD on HD and other comorbidities who was brought from [place] on 11/05 for agitation and lack of compliance with HD, admitted for placement issues and for HD resumption. Currently hemodynamically stable.      ESRD   HAGMA (High AG Metabolic Acidosis)  Normocytic anemia  * Started on HD every Tuesday, Thursday, and Saturday around 7 weeks ago after a recent [facility] admission  * Normocytic Anemia At goal for ESRD patient  * No iron studies in chart    - Nephrology Team on board  - Follow up HD plan every Tuesday, Thursday, and Saturday.  on board for outpatient spot since [facility] HD center and another center in [place] seem to have no spots for the patient.  - Trend CMP + P on a daily basis  - Trend AG + CO2: 11/23 AG 20, CO2 17  - Continue calcium acetate 2 tablets QAC   - Continue Venofer post HD   - Continue Holding EPO for now      DM   Diabetic neuropathy  * HbA1c: 5.7 11/06/21  - Monitor POCT premeals and at bedtime  - Continue Lantus 21 units at bedtime  - Continue Lispro 7 units TID  - Continue Apidra Sliding Scale A  - Continue gabapentin 300 milliGRAM(s) Oral every 12 hours      History of Vitamin b12 deficiency  * No Vitamin B12 level in chart  - Trend CBC daily  - Continue cyanocobalamin 1000 MICROGram(s) Oral daily      CAD   * s/p Stents  * No troponin sent during this admission  * No lipid profile in chart  - Continue Atorvastatin 40 milliGRAM(s) Oral at bedtime  - Continue clopidogrel Tablet 75 milliGRAM(s) Oral daily      History of DVT  * Diagnosed > 15 eyars ago  * Per patient, he was on coumadin for 15 years --> recently switched to Eliquis (unsure why he is on it indefinitely or whether it is provoked or unprovoked)  * No VA Duplex Venous LE in chart  - Conitnue apixaban 5 milliGRAM(s) Oral two times a day  - Will ask to follow up with PCP once discharged and continue conversation about ongoing need       Hypertension   - Monitor BP closely: 11/23 138/67 mmHg  - Continue Amlodipine 5mg QD      Others  - DVT Prophylaxis: as detailed above  - GI Prophylaxis: not on Pantoprazole 40mg PO QD  - Diet: Renal Restrictions  - Code Status: Full      Barriers to learning: NO  Discharge Planning: Patient will be discharged once stable   Plan was communicated with patient and medical team      Ashley Del Rio MD  PGY - 2 Internal Medicine   [facility]

## 2021-11-24 NOTE — PROGRESS NOTE ADULT - SUBJECTIVE AND OBJECTIVE BOX
Nephrology Progress Note    ALEXANDRO MONROE  MRN-[id number]  56y  Male    S:  Patient is seen and examined, events over the last 24h noted.    O:  Allergies:  No Known Allergies    Hospital Medications:   MEDICATIONS  (STANDING):  amLODIPine   Tablet 5 milliGRAM(s) Oral daily  apixaban 5 milliGRAM(s) Oral two times a day  atorvastatin 40 milliGRAM(s) Oral at bedtime  BACItracin   Ointment 1 Application(s) Topical two times a day  calcitriol   Capsule 0.5 MICROGram(s) Oral <User Schedule>  calcium acetate 1334 milliGRAM(s) Oral four times a day with meals  clopidogrel Tablet 75 milliGRAM(s) Oral daily  cyanocobalamin 1000 MICROGram(s) Oral daily  dextrose 40% Gel 15 Gram(s) Oral once  dextrose 5%. 1000 milliLiter(s) (50 mL/Hr) IV Continuous <Continuous>  dextrose 5%. 1000 milliLiter(s) (100 mL/Hr) IV Continuous <Continuous>  dextrose 50% Injectable 25 Gram(s) IV Push once  dextrose 50% Injectable 12.5 Gram(s) IV Push once  dextrose 50% Injectable 25 Gram(s) IV Push once  gabapentin 300 milliGRAM(s) Oral daily  glucagon  Injectable 1 milliGRAM(s) IntraMuscular once  influenza   Vaccine 0.5 milliLiter(s) IntraMuscular once  insulin glargine Injectable (LANTUS) 21 Unit(s) SubCutaneous at bedtime  insulin lispro (ADMELOG) corrective regimen sliding scale   SubCutaneous three times a day before meals  insulin lispro Injectable (ADMELOG) 7 Unit(s) SubCutaneous three times a day before meals  iron sucrose Injectable 100 milliGRAM(s) IV Push <User Schedule>  multivitamin 1 Tablet(s) Oral daily    MEDICATIONS  (PRN):  simethicone 80 milliGRAM(s) Chew four times a day PRN Dyspepsia    Home Medications:  Aranesp 40 mcg/0.4 mL injectable solution: 0.4 milliliter(s) injectable once a week thursday (05 Nov 2021 21:40)  atorvastatin 40 mg oral tablet: 1 tab(s) orally once a day (05 Nov 2021 21:42)  calcitriol 0.5 mcg oral capsule: 1 cap(s) orally Tuesday, Thursday, Saturday (05 Nov 2021 21:42)  cyanocobalamin 100 mcg oral tablet: 1 tab(s) orally once a day (05 Nov 2021 21:42)  Eliquis 5 mg oral tablet: 1 tab(s) orally 2 times a day (05 Nov 2021 21:41)  gabapentin 300 mg oral tablet: 1 cap(s) orally every 24 hours (16 Nov 2021 10:04)  Januvia 25 mg oral tablet: 1 tab(s) orally once a day (05 Nov 2021 21:41)  Nephrocaps oral capsule: 1 cap(s) orally once a day (05 Nov 2021 21:41)  Plavix 75 mg oral tablet: 1 tab(s) orally once a day (05 Nov 2021 21:42)  simethicone 80 mg oral tablet, chewable: 1 tab(s) orally 4 times a day (after meals and at bedtime), As Needed (05 Nov 2021 21:41)  Venofer 20 mg/mL intravenous solution: 5 milliliter(s) intravenous Tuesday, Thursday, Sunday with HD (05 Nov 2021 21:38)      VITALS:  Daily     Daily   T(F): 96.8 (11-24-21 @ 12:45), Max: 96.9 (11-23-21 @ 21:29)  HR: 88 (11-24-21 @ 12:45)  BP: 152/71 (11-24-21 @ 12:45)  RR: 20 (11-24-21 @ 12:45)  SpO2: 98% (11-23-21 @ 21:29)  Wt(kg): --  I&O's Detail    23 Nov 2021 07:01  -  24 Nov 2021 07:00  --------------------------------------------------------  IN:  Total IN: 0 mL    OUT:    Other (mL): 2000 mL    Voided (mL): 100 mL  Total OUT: 2100 mL    Total NET: -2100 mL      24 Nov 2021 07:01  -  24 Nov 2021 14:50  --------------------------------------------------------  IN:    Oral Fluid: 690 mL  Total IN: 690 mL    OUT:    Voided (mL): 400 mL  Total OUT: 400 mL    Total NET: 290 mL        I&O's Summary    23 Nov 2021 07:01  -  24 Nov 2021 07:00  --------------------------------------------------------  IN: 0 mL / OUT: 2100 mL / NET: -2100 mL    24 Nov 2021 07:01  -  24 Nov 2021 14:50  --------------------------------------------------------  IN: 690 mL / OUT: 400 mL / NET: 290 mL          PHYSICAL EXAM:  Gen: NAD  Resp:   Card: S1/S2  Abd: soft  Extremities:   Vascular access:       LABS:      11-24    136  |  100  |  61<HH>  ----------------------------<  160<H>  4.8   |  19  |  5.3<HH>    Ca    8.2<L>      24 Nov 2021 08:50  Phos  5.9     11-24  Mg     2.0     11-24    TPro  6.0  /  Alb  3.2<L>  /  TBili  <0.2  /  DBili      /  AST  17  /  ALT  19  /  AlkPhos  163<H>  11-24    eGFR if Non African American: 11 mL/min/1.73M2 (11-24-21 @ 08:50)  eGFR if : 13 mL/min/1.73M2 (11-24-21 @ 08:50)    Phosphorus Level, Serum: 5.9 mg/dL (11-24-21 @ 08:50)  Phosphorus Level, Serum: 6.7 mg/dL (11-23-21 @ 07:36)    Intact PTH: 512 pg/mL (11-16-21 @ 10:33)                          9.8    5.66  )-----------( 193      ( 24 Nov 2021 08:50 )             30.6     Mean Cell Volume: 88.4 fL (11-24-21 @ 08:50)        Urine Studies:          Creatinine trend:  Creatinine, Serum: 5.3 mg/dL (11-24-21 @ 08:50)  Creatinine, Serum: 4.9 mg/dL (11-24-21 @ 01:00)  Creatinine, Serum: 7.2 mg/dL (11-23-21 @ 07:36)  Creatinine, Serum: 6.3 mg/dL (11-22-21 @ 22:09)  Creatinine, Serum: 6.2 mg/dL (11-22-21 @ 07:04)  Creatinine, Serum: 6.2 mg/dL (11-20-21 @ 04:30)  Creatinine, Serum: 5.7 mg/dL (11-19-21 @ 06:00)  Creatinine, Serum: 5.0 mg/dL (11-17-21 @ 07:36)  Creatinine, Serum: 6.3 mg/dL (11-16-21 @ 10:33)  Creatinine, Serum: 4.7 mg/dL (11-14-21 @ 06:17)  Creatinine, Serum: 4.2 mg/dL (11-13-21 @ 18:10)  Creatinine, Serum: 5.2 mg/dL (11-12-21 @ 07:11)  Creatinine, Serum: 5.3 mg/dL (11-09-21 @ 04:30)  Creatinine, Serum: 7.5 mg/dL (11-08-21 @ 08:00)  Creatinine, Serum: 6.8 mg/dL (11-07-21 @ 04:30)  Creatinine, Serum: 6.2 mg/dL (11-06-21 @ 16:00)  Creatinine, Serum: 5.9 mg/dL (11-06-21 @ 09:26)  Creatinine, Serum: 4.9 mg/dL (11-05-21 @ 15:55)    Hepatitis B Surface Antibody: Nonreact (11-13-21 @ 09:30)  Hepatitis B Surface Antigen: Nonreact (11-08-21 @ 15:44)  Hepatitis C Virus S/CO Ratio: 0.12 S/CO (11-08-21 @ 15:44)         Nephrology Progress Note    ALEXANDRO MONROE  MRN-[id number]  56y  Male    S:  Patient is seen and examined, events over the last 24h noted.    O:  Allergies:  No Known Allergies    Hospital Medications:   MEDICATIONS  (STANDING):  amLODIPine   Tablet 5 milliGRAM(s) Oral daily  apixaban 5 milliGRAM(s) Oral two times a day  atorvastatin 40 milliGRAM(s) Oral at bedtime  BACItracin   Ointment 1 Application(s) Topical two times a day  calcitriol   Capsule 0.5 MICROGram(s) Oral <User Schedule>  calcium acetate 1334 milliGRAM(s) Oral four times a day with meals  clopidogrel Tablet 75 milliGRAM(s) Oral daily  cyanocobalamin 1000 MICROGram(s) Oral daily  gabapentin 300 milliGRAM(s) Oral daily  influenza   Vaccine 0.5 milliLiter(s) IntraMuscular once  insulin glargine Injectable (LANTUS) 21 Unit(s) SubCutaneous at bedtime  insulin lispro (ADMELOG) corrective regimen sliding scale   SubCutaneous three times a day before meals  insulin lispro Injectable (ADMELOG) 7 Unit(s) SubCutaneous three times a day before meals  iron sucrose Injectable 100 milliGRAM(s) IV Push <User Schedule>  multivitamin 1 Tablet(s) Oral daily    MEDICATIONS  (PRN):  simethicone 80 milliGRAM(s) Chew four times a day PRN Dyspepsia    Home Medications:  Aranesp 40 mcg/0.4 mL injectable solution: 0.4 milliliter(s) injectable once a week thursday (05 Nov 2021 21:40)  atorvastatin 40 mg oral tablet: 1 tab(s) orally once a day (05 Nov 2021 21:42)  calcitriol 0.5 mcg oral capsule: 1 cap(s) orally Tuesday, Thursday, Saturday (05 Nov 2021 21:42)  cyanocobalamin 100 mcg oral tablet: 1 tab(s) orally once a day (05 Nov 2021 21:42)  Eliquis 5 mg oral tablet: 1 tab(s) orally 2 times a day (05 Nov 2021 21:41)  gabapentin 300 mg oral tablet: 1 cap(s) orally every 24 hours (16 Nov 2021 10:04)  Januvia 25 mg oral tablet: 1 tab(s) orally once a day (05 Nov 2021 21:41)  Nephrocaps oral capsule: 1 cap(s) orally once a day (05 Nov 2021 21:41)  Plavix 75 mg oral tablet: 1 tab(s) orally once a day (05 Nov 2021 21:42)  simethicone 80 mg oral tablet, chewable: 1 tab(s) orally 4 times a day (after meals and at bedtime), As Needed (05 Nov 2021 21:41)  Venofer 20 mg/mL intravenous solution: 5 milliliter(s) intravenous Tuesday, Thursday, Sunday with HD (05 Nov 2021 21:38)      VITALS:  T(F): 96.8 (11-24-21 @ 12:45), Max: 96.9 (11-23-21 @ 21:29)  HR: 88 (11-24-21 @ 12:45)  BP: 152/71 (11-24-21 @ 12:45)  RR: 20 (11-24-21 @ 12:45)  SpO2: 98% (11-23-21 @ 21:29)  Wt(kg): --  I&O's Detail    23 Nov 2021 07:01  -  24 Nov 2021 07:00  --------------------------------------------------------  IN:  Total IN: 0 mL    OUT:    Other (mL): 2000 mL    Voided (mL): 100 mL  Total OUT: 2100 mL    Total NET: -2100 mL      24 Nov 2021 07:01  -  24 Nov 2021 14:50  --------------------------------------------------------  IN:    Oral Fluid: 690 mL  Total IN: 690 mL    OUT:    Voided (mL): 400 mL  Total OUT: 400 mL    Total NET: 290 mL        I&O's Summary    23 Nov 2021 07:01  -  24 Nov 2021 07:00  --------------------------------------------------------  IN: 0 mL / OUT: 2100 mL / NET: -2100 mL    24 Nov 2021 07:01  -  24 Nov 2021 14:50  --------------------------------------------------------  IN: 690 mL / OUT: 400 mL / NET: 290 mL          PHYSICAL EXAM:  Gen: NAD  Resp: CTAB  Card: S1/S2  Abd: soft  Extremities: no edema  Vascular access: TDC      LABS:      11-24    136  |  100  |  61<HH>  ----------------------------<  160<H>  4.8   |  19  |  5.3<HH>    Ca    8.2<L>      24 Nov 2021 08:50  Phos  5.9     11-24  Mg     2.0     11-24    TPro  6.0  /  Alb  3.2<L>  /  TBili  <0.2  /  DBili      /  AST  17  /  ALT  19  /  AlkPhos  163<H>  11-24    eGFR if Non African American: 11 mL/min/1.73M2 (11-24-21 @ 08:50)  eGFR if : 13 mL/min/1.73M2 (11-24-21 @ 08:50)    Phosphorus Level, Serum: 5.9 mg/dL (11-24-21 @ 08:50)  Phosphorus Level, Serum: 6.7 mg/dL (11-23-21 @ 07:36)    Intact PTH: 512 pg/mL (11-16-21 @ 10:33)                          9.8    5.66  )-----------( 193      ( 24 Nov 2021 08:50 )             30.6     Mean Cell Volume: 88.4 fL (11-24-21 @ 08:50)            Creatinine trend:  Creatinine, Serum: 5.3 mg/dL (11-24-21 @ 08:50)  Creatinine, Serum: 4.9 mg/dL (11-24-21 @ 01:00)  Creatinine, Serum: 7.2 mg/dL (11-23-21 @ 07:36)  Creatinine, Serum: 6.3 mg/dL (11-22-21 @ 22:09)  Creatinine, Serum: 6.2 mg/dL (11-22-21 @ 07:04)  Creatinine, Serum: 6.2 mg/dL (11-20-21 @ 04:30)

## 2021-11-24 NOTE — PROGRESS NOTE ADULT - SUBJECTIVE AND OBJECTIVE BOX
Location: [address] ([address])  Patient Name: ALEXANDRO MONROE  Age: 56y  Gender: Male      Progress Note  This morning patient was seen and examined at bedside.    Today is hospital day 19d.  Mr. Monroe is doing fine.   He reports he had a good night sleep.   His appetite is adequate, and he denies nausea or vomiting.   He denies any abdominal pain, diarrhea, or constipation. His last bowel movement was soft and was on 11/22.   He is ambulating with a walker and denies any shortness of breath, chest pain, palpitations, or light headedness.   He has no callahan catheter and is on HD.      Vital Signs in the last 24 hours   Vitals Summary T(C): 35.7 (11-24-21 @ 05:00), Max: 36.1 (11-23-21 @ 21:29)  HR: 89 (11-24-21 @ 05:00) (80 - 970)  BP: 110/66 (11-24-21 @ 05:00) (110/66 - 139/63)  RR: 18 (11-24-21 @ 05:00) (18 - 19)  SpO2: 98% (11-23-21 @ 21:29) (98% - 98%)  Vent Data   Intake/ Output   11-23-21 @ 07:01  -  11-24-21 @ 07:00  --------------------------------------------------------  IN: 0 mL / OUT: 2100 mL / NET: -2100 mL      Physical Exam  * General Appearance: Alert, cooperative, interactive, well-groomed, oriented to time, place, and person, in no acute distress  * Head: Normocephalic, without obvious abnormality, atraumatic  * Thyroid:  No enlargement/tenderness/nodules; no carotid bruit or JVD  * Back: Symmetric, no curvature, ROM normal, no CVA tenderness  * Lungs: Respirations unlabored, Good bilateral air entry, normal breath sounds (Clear to auscultation bilaterally, no audible wheezes, crackles, or rhonchi)  * Heart: Regular Rate and Rhythm, normal S1 and S2, no audible murmur, rub, or gallop  * Abdomen: Symmetric, non-distended, no scar, soft, non-tender, bowel sounds active all four quadrants, no masses, no organomegaly (no hepatosplenomegaly)  * Extremities: Extremities normal, atraumatic, no cyanosis, no lower extremity pitting edema bilaterally, adequate dorsalis pedis pulses  * Pulses: 2+ and symmetric all extremities  * Skin: Skin color, texture, turgor normal, no rashes or lesions  * Lymph nodes: Cervical, supraclavicular, and axillary nodes normal  * Neurologic: CNII-XII intact, normal strength, sensation and reflexes throughout      Investigations   Laboratory Workup  - CBC:                        9.8    5.66  )-----------( 193      ( 24 Nov 2021 08:50 )             30.6     - Chemistry:  11-24    136  |  100  |  61<HH>  ----------------------------<  160<H>  4.8   |  19  |  5.3<HH>    Ca    8.2<L>      24 Nov 2021 08:50  Phos  5.9     11-24  Mg     2.0     11-24    TPro  6.0  /  Alb  3.2<L>  /  TBili  <0.2  /  DBili  x   /  AST  17  /  ALT  19  /  AlkPhos  163<H>  11-24      Current Medications  Standing Medications  amLODIPine   Tablet 5 milliGRAM(s) Oral daily  apixaban 5 milliGRAM(s) Oral two times a day  atorvastatin 40 milliGRAM(s) Oral at bedtime  BACItracin   Ointment 1 Application(s) Topical two times a day  calcitriol   Capsule 0.5 MICROGram(s) Oral <User Schedule>  calcium acetate 1334 milliGRAM(s) Oral four times a day with meals  clopidogrel Tablet 75 milliGRAM(s) Oral daily  cyanocobalamin 1000 MICROGram(s) Oral daily  dextrose 40% Gel 15 Gram(s) Oral once  dextrose 5%. 1000 milliLiter(s) (50 mL/Hr) IV Continuous <Continuous>  dextrose 5%. 1000 milliLiter(s) (100 mL/Hr) IV Continuous <Continuous>  dextrose 50% Injectable 25 Gram(s) IV Push once  dextrose 50% Injectable 12.5 Gram(s) IV Push once  dextrose 50% Injectable 25 Gram(s) IV Push once  gabapentin 300 milliGRAM(s) Oral daily  glucagon  Injectable 1 milliGRAM(s) IntraMuscular once  influenza   Vaccine 0.5 milliLiter(s) IntraMuscular once  insulin glargine Injectable (LANTUS) 21 Unit(s) SubCutaneous at bedtime  insulin lispro (ADMELOG) corrective regimen sliding scale   SubCutaneous three times a day before meals  insulin lispro Injectable (ADMELOG) 7 Unit(s) SubCutaneous three times a day before meals  iron sucrose Injectable 100 milliGRAM(s) IV Push <User Schedule>  multivitamin 1 Tablet(s) Oral daily    PRN Medications  simethicone 80 milliGRAM(s) Chew four times a day PRN Dyspepsia    Singles Doses Administered  labetalol 100 milliGRAM(s) Oral once  loperamide 2 milliGRAM(s) Oral once PRN  magnesium sulfate  IVPB 2 Gram(s) IV Intermittent once  magnesium sulfate  IVPB 2 Gram(s) IV Intermittent once  sodium zirconium cyclosilicate 10 Gram(s) Oral once  sodium zirconium cyclosilicate 5 Gram(s) Oral once

## 2021-11-24 NOTE — PROGRESS NOTE ADULT - ASSESSMENT
57 yo M with PMH of DM, CAD s/p Stents, ESRD on HD  at [facility] presented from [facility] for eval of agitation. As per NH pt became agitated and refused medications.  Patient stated that he does not want to go back to [facility] and he wants to do HHD     # ESRD on HD TTS  # MBD  # Normocytic anemia / ?iron deficient   # HTN    - next HD Friday  - on venofer, iron stores not available- please obtain iron stores.  Pt completed 8 doses of iron sucrose iv, hgb is at goal.  Hold iv iron until iron stores obtained  - phos level above goal, increase calcium acetate to 3 tabs qac, maintain low phos diet  - low k / renal diet   - BP controlled  - d/c planning - will need HD spot at [address] vs. STR with dialysis unit

## 2021-11-24 NOTE — PROGRESS NOTE ADULT - ATTENDING COMMENTS
****My note supersedes any discrepancies that may be above in the resident's note***    55 yo M with PMH of DM, CAD s/p Stents, ESRD on HD sat/tu/th presented from [facility] for social issue.  As per NH pt became agitated and refused medications and dialysis. Per patient he had inadequate care at NH and wanted to go home with home care. Upon admission to our hospital, pt requesting admission to a different NH. Otherwise medically stable, receiving PT and HD here.     GENERAL: elderly M, NAD, non toxic appearing  EYES: anicteric sclera, non injected conjunctiva, EOMI  ENT: hearing grossly intact, oropharynx clear, MMM  PSYCH: no agitation, baseline mentation  NERVOUS SYSTEM:  Alert & Oriented X3, CN 2-12 grossly intact  PULMONARY: Clear to percussion bilaterally; No rales, rhonchi, wheezing, or rubs  CARDIOVASCULAR: Regular rate and rhythm; No murmurs, rubs, or gallops  GI: Soft, Nontender, Nondistended; Bowel sounds present  EXTREMITIES:  2+ Peripheral Pulses, No clubbing, cyanosis, or edema  LYMPH: No lymphadenopathy noted  SKIN: No rashes or lesions      # ESRD on HD (TTS)  - calcium acetate 2 tab qac per nephro   - Venofer post HD   - Renal following   - Holding EPO     # History of b12 def  - cyanocobalamin 1000 MICROGram(s) Oral daily    # DM neuropathy  - gabapentin 300 milliGRAM(s) Oral every 12 hours    # Diabetes mellitus with hyperglycemia  - A1c: 5.7  - insulin glargine Injectable (LANTUS) 21 Unit(s) SubCutaneous at bedtime  - insulin lispro (ADMELOG) corrective regimen sliding scale   SubCutaneous three times a day before meals  - insulin lispro Injectable (ADMELOG) 7 Unit(s) SubCutaneous three times a day before meals  - Carbohydrate consistent diet     # Normocytic anemia, at goal for ESRD pt.  - Pt denies any bleeding symptoms. Replace as necessary.   - venofer as above  - DC'ed EPO per Nephro     # HAGMA (High AG Metabolic Acidosis)  - from uremic acidosis. On HD.    # Hx of CAD s/p Stents.  - atorvastatin 40 milliGRAM(s) Oral at bedtime  - clopidogrel Tablet 75 milliGRAM(s) Oral daily    # H/O DVT:   - per patient he was on coumadin for 15 years, recently switched to Eliquis. unsure why he is on it indefinitely(provoked vs unprovoked PE)- poor historian.  - apixaban 5 milliGRAM(s) Oral two times a day  - will need to follow up with PCP once discharged and continue conversation about ongoing need     # Elevated BP / Hypertension   - Started on Amlodipine   - If post-HD BP is elevated will increase Amlodipine     #Progress Note Handoff:  Pending (specify): Pending STR vs Home w/ HD   Family discussion: d/w pt
55 yo M with PMH of DM, CAD s/p Stents, ESRD on HD sat/tu/th presented from [facility] for social issue.  As per NH pt became agitated and refused medications and dialysis. Per patient he had inadequate care at NH and wanted to go home with home care. Upon admission to our hospital, pt requesting admission to a different NH. Otherwise medically stable, receiving PT and HD here. Remainder as above.       Jessie Narvaez, DO
55 yo M with PMH of DM, CAD s/p Stents, ESRD on HD sat/tu/th presented from [facility] for social issue.  As per NH pt became agitated and refused medications and dialysis. Per patient he had inadequate care at NH and wanted to go home with home care. Upon admission to our hospital, pt requesting admission to a different NH. Otherwise medically stable, receiving PT and HD here.     # ESRD on HD (TTS).   - calcium acetate 2 tab qac per nephro   - Venofer post HD   - Renal following   - Holding EPO     # History of b12 def  - cyanocobalamin 1000 MICROGram(s) Oral daily    # DM neuropathy  - gabapentin 300 milliGRAM(s) Oral every 12 hours    # Diabetes mellitus with hyperglycemia  - A1c: 5.7  - insulin glargine Injectable (LANTUS) 21 Unit(s) SubCutaneous at bedtime  - insulin lispro (ADMELOG) corrective regimen sliding scale   SubCutaneous three times a day before meals  - insulin lispro Injectable (ADMELOG) 7 Unit(s) SubCutaneous three times a day before meals  - Carbohydrate consistent diet     # Normocytic anemia, at goal for ESRD pt.  - Pt denies any bleeding symptoms. Replace as necessary.   - venofer as above  - DC'ed EPO per Nephro     # HAGMA (High AG Metabolic Acidosis)  - from uremic acidosis. On HD.    # Hx of CAD s/p Stents.  - atorvastatin 40 milliGRAM(s) Oral at bedtime  - clopidogrel Tablet 75 milliGRAM(s) Oral daily    # H/O DVT:   - per patient he was on coumadin for 15 years, recently switched to Eliquis. unsure why he is on it indefinitely(provoked vs unprovoked PE)- poor historian.  - apixaban 5 milliGRAM(s) Oral two times a day  - will need to follow up with PCP once discharged and continue conversation about ongoing need     # Elevated BP / Hypertension   - Started on Amlodipine   - If post-HD BP is elevated will increase Amlodipine     #Progress Note Handoff:  Pending (specify): Pending STR vs Home w/ HD   Family discussion: d/w pt       Jessie Narvaez DO

## 2021-11-25 LAB
ALBUMIN SERPL ELPH-MCNC: 3.2 G/DL — LOW (ref 3.5–5.2)
ALP SERPL-CCNC: 163 U/L — HIGH (ref 30–115)
ALT FLD-CCNC: 20 U/L — SIGNIFICANT CHANGE UP (ref 0–41)
ANION GAP SERPL CALC-SCNC: 17 MMOL/L — HIGH (ref 7–14)
AST SERPL-CCNC: 17 U/L — SIGNIFICANT CHANGE UP (ref 0–41)
BASOPHILS # BLD AUTO: 0.04 K/UL — SIGNIFICANT CHANGE UP (ref 0–0.2)
BASOPHILS NFR BLD AUTO: 0.7 % — SIGNIFICANT CHANGE UP (ref 0–1)
BILIRUB SERPL-MCNC: 0.2 MG/DL — SIGNIFICANT CHANGE UP (ref 0.2–1.2)
BUN SERPL-MCNC: 75 MG/DL — CRITICAL HIGH (ref 10–20)
CALCIUM SERPL-MCNC: 7.9 MG/DL — LOW (ref 8.5–10.1)
CHLORIDE SERPL-SCNC: 102 MMOL/L — SIGNIFICANT CHANGE UP (ref 98–110)
CO2 SERPL-SCNC: 18 MMOL/L — SIGNIFICANT CHANGE UP (ref 17–32)
CREAT SERPL-MCNC: 6.4 MG/DL — CRITICAL HIGH (ref 0.7–1.5)
EOSINOPHIL # BLD AUTO: 0.4 K/UL — SIGNIFICANT CHANGE UP (ref 0–0.7)
EOSINOPHIL NFR BLD AUTO: 7.3 % — SIGNIFICANT CHANGE UP (ref 0–8)
GLUCOSE BLDC GLUCOMTR-MCNC: 128 MG/DL — HIGH (ref 70–99)
GLUCOSE BLDC GLUCOMTR-MCNC: 156 MG/DL — HIGH (ref 70–99)
GLUCOSE BLDC GLUCOMTR-MCNC: 173 MG/DL — HIGH (ref 70–99)
GLUCOSE BLDC GLUCOMTR-MCNC: 186 MG/DL — HIGH (ref 70–99)
GLUCOSE SERPL-MCNC: 166 MG/DL — HIGH (ref 70–99)
HCT VFR BLD CALC: 31.1 % — LOW (ref 42–52)
HGB BLD-MCNC: 9.6 G/DL — LOW (ref 14–18)
IMM GRANULOCYTES NFR BLD AUTO: 0.2 % — SIGNIFICANT CHANGE UP (ref 0.1–0.3)
LYMPHOCYTES # BLD AUTO: 1.25 K/UL — SIGNIFICANT CHANGE UP (ref 1.2–3.4)
LYMPHOCYTES # BLD AUTO: 22.9 % — SIGNIFICANT CHANGE UP (ref 20.5–51.1)
MAGNESIUM SERPL-MCNC: 2 MG/DL — SIGNIFICANT CHANGE UP (ref 1.8–2.4)
MCHC RBC-ENTMCNC: 27.7 PG — SIGNIFICANT CHANGE UP (ref 27–31)
MCHC RBC-ENTMCNC: 30.9 G/DL — LOW (ref 32–37)
MCV RBC AUTO: 89.9 FL — SIGNIFICANT CHANGE UP (ref 80–94)
MONOCYTES # BLD AUTO: 0.47 K/UL — SIGNIFICANT CHANGE UP (ref 0.1–0.6)
MONOCYTES NFR BLD AUTO: 8.6 % — SIGNIFICANT CHANGE UP (ref 1.7–9.3)
NEUTROPHILS # BLD AUTO: 3.29 K/UL — SIGNIFICANT CHANGE UP (ref 1.4–6.5)
NEUTROPHILS NFR BLD AUTO: 60.3 % — SIGNIFICANT CHANGE UP (ref 42.2–75.2)
NRBC # BLD: 0 /100 WBCS — SIGNIFICANT CHANGE UP (ref 0–0)
PHOSPHATE SERPL-MCNC: 6.1 MG/DL — HIGH (ref 2.1–4.9)
PLATELET # BLD AUTO: 200 K/UL — SIGNIFICANT CHANGE UP (ref 130–400)
POTASSIUM SERPL-MCNC: 5 MMOL/L — SIGNIFICANT CHANGE UP (ref 3.5–5)
POTASSIUM SERPL-SCNC: 5 MMOL/L — SIGNIFICANT CHANGE UP (ref 3.5–5)
PROT SERPL-MCNC: 6 G/DL — SIGNIFICANT CHANGE UP (ref 6–8)
RBC # BLD: 3.46 M/UL — LOW (ref 4.7–6.1)
RBC # FLD: 13.9 % — SIGNIFICANT CHANGE UP (ref 11.5–14.5)
SODIUM SERPL-SCNC: 137 MMOL/L — SIGNIFICANT CHANGE UP (ref 135–146)
WBC # BLD: 5.46 K/UL — SIGNIFICANT CHANGE UP (ref 4.8–10.8)
WBC # FLD AUTO: 5.46 K/UL — SIGNIFICANT CHANGE UP (ref 4.8–10.8)

## 2021-11-25 PROCEDURE — 99232 SBSQ HOSP IP/OBS MODERATE 35: CPT

## 2021-11-25 RX ADMIN — Medication 7 UNIT(S): at 16:35

## 2021-11-25 RX ADMIN — Medication 1: at 16:34

## 2021-11-25 RX ADMIN — AMLODIPINE BESYLATE 5 MILLIGRAM(S): 2.5 TABLET ORAL at 05:24

## 2021-11-25 RX ADMIN — Medication 7 UNIT(S): at 12:23

## 2021-11-25 RX ADMIN — PREGABALIN 1000 MICROGRAM(S): 225 CAPSULE ORAL at 12:22

## 2021-11-25 RX ADMIN — CLOPIDOGREL BISULFATE 75 MILLIGRAM(S): 75 TABLET, FILM COATED ORAL at 12:22

## 2021-11-25 RX ADMIN — Medication 1 TABLET(S): at 12:21

## 2021-11-25 RX ADMIN — Medication 2001 MILLIGRAM(S): at 12:21

## 2021-11-25 RX ADMIN — INSULIN GLARGINE 21 UNIT(S): 100 INJECTION, SOLUTION SUBCUTANEOUS at 23:25

## 2021-11-25 RX ADMIN — Medication 2001 MILLIGRAM(S): at 17:27

## 2021-11-25 RX ADMIN — Medication 1 APPLICATION(S): at 17:29

## 2021-11-25 RX ADMIN — GABAPENTIN 300 MILLIGRAM(S): 400 CAPSULE ORAL at 12:22

## 2021-11-25 RX ADMIN — Medication 1: at 08:04

## 2021-11-25 RX ADMIN — APIXABAN 5 MILLIGRAM(S): 2.5 TABLET, FILM COATED ORAL at 17:27

## 2021-11-25 RX ADMIN — Medication 2001 MILLIGRAM(S): at 08:09

## 2021-11-25 RX ADMIN — Medication 1 APPLICATION(S): at 05:26

## 2021-11-25 RX ADMIN — ATORVASTATIN CALCIUM 40 MILLIGRAM(S): 80 TABLET, FILM COATED ORAL at 23:25

## 2021-11-25 RX ADMIN — APIXABAN 5 MILLIGRAM(S): 2.5 TABLET, FILM COATED ORAL at 05:24

## 2021-11-25 RX ADMIN — Medication 7 UNIT(S): at 08:05

## 2021-11-25 RX ADMIN — CALCITRIOL 0.5 MICROGRAM(S): 0.5 CAPSULE ORAL at 12:20

## 2021-11-25 NOTE — PROGRESS NOTE ADULT - ASSESSMENT
55 yo M with PMH of DM, CAD s/p Stents, ESRD on HD sat/tu/th presented from [facility] for social issue.  As per NH pt became agitated and refused medications and dialysis. Per patient he had inadequate care at NH and wanted to go home with home care. Upon admission to our hospital, pt requesting admission to a different NH. Otherwise medically stable, receiving PT and HD here.       # ESRD on HD (TTS)  - calcium acetate 2 tab qac per nephro   - Venofer post HD   - Renal following; next HD session tomorrow 11/26 then discharge. will start new HD chair on Saturday(likely shorter session)   - Holding EPO     # History of b12 def  - cyanocobalamin 1000 MICROGram(s) Oral daily    # DM neuropathy  - gabapentin 300 milliGRAM(s) Oral every 12 hours    # Diabetes mellitus with hyperglycemia  - A1c: 5.7  - insulin glargine Injectable (LANTUS) 21 Unit(s) SubCutaneous at bedtime  - insulin lispro (ADMELOG) corrective regimen sliding scale   SubCutaneous three times a day before meals  - insulin lispro Injectable (ADMELOG) 7 Unit(s) SubCutaneous three times a day before meals  - Carbohydrate consistent diet     # Normocytic anemia, at goal for ESRD pt.  - Pt denies any bleeding symptoms. Replace as necessary.   - venofer as above  - DC'ed EPO per Nephro     # HAGMA (High AG Metabolic Acidosis)  - from uremic acidosis. On HD.    # Hx of CAD s/p Stents.  - atorvastatin 40 milliGRAM(s) Oral at bedtime  - clopidogrel Tablet 75 milliGRAM(s) Oral daily    # H/O DVT:   - per patient he was on coumadin for 15 years, recently switched to Eliquis. unsure why he is on it indefinitely(provoked vs unprovoked PE)- poor historian.  - apixaban 5 milliGRAM(s) Oral two times a day  - will need to follow up with PCP once discharged and continue conversation about ongoing need     # Elevated BP / Hypertension   - Started on Amlodipine   - If post-HD BP is elevated will increase Amlodipine     #Progress Note Handoff:  Pending (specify):next HD session tomorrow 11/26 then discharge. will start new HD chair on Saturday(likely shorter session)   Family discussion: d/w pt .

## 2021-11-25 NOTE — PROGRESS NOTE ADULT - SUBJECTIVE AND OBJECTIVE BOX
ALEXANDRO MONROE  56y  Male      Patient is a 56y old  Male who presents with a chief complaint of Inadequate social support (25 Nov 2021 08:58)      INTERVAL HPI/OVERNIGHT EVENTS: no acute events overnight. no nursing concerns.       REVIEW OF SYSTEMS:  CONSTITUTIONAL: No fever, weight loss, or fatigue  RESPIRATORY: No cough, wheezing, chills or hemoptysis; No shortness of breath  CARDIOVASCULAR: No chest pain, palpitations, dizziness, or leg swelling  GASTROINTESTINAL: No abdominal or epigastric pain. No nausea, vomiting, or hematemesis; No diarrhea or constipation. No melena or hematochezia.  GENITOURINARY: No dysuria, frequency, hematuria, or incontinence  NEUROLOGICAL: No headaches, memory loss, loss of strength, numbness, or tremors  SKIN: No itching, burning, rashes, or lesions   MUSCULOSKELETAL: No joint pain or swelling; No muscle, back, or extremity pain  PSYCHIATRIC: No depression, anxiety, mood swings, or difficulty sleeping  All other review of systems negative    VITALS  T(C): 36.1 (11-25-21 @ 05:08), Max: 36.6 (11-24-21 @ 22:59)  HR: 96 (11-25-21 @ 05:08) (88 - 96)  BP: 130/60 (11-25-21 @ 05:08) (130/60 - 152/71)  RR: 18 (11-25-21 @ 05:08) (18 - 20)  SpO2: --  Wt(kg): --Vital Signs Last 24 Hrs  T(C): 36.1 (25 Nov 2021 05:08), Max: 36.6 (24 Nov 2021 22:59)  T(F): 96.9 (25 Nov 2021 05:08), Max: 97.8 (24 Nov 2021 22:59)  HR: 96 (25 Nov 2021 05:08) (88 - 96)  BP: 130/60 (25 Nov 2021 05:08) (130/60 - 152/71)  BP(mean): --  RR: 18 (25 Nov 2021 05:08) (18 - 20)  SpO2: --      11-24-21 @ 07:01  -  11-25-21 @ 07:00  --------------------------------------------------------  IN: 690 mL / OUT: 400 mL / NET: 290 mL        PHYSICAL EXAM:  GENERAL: elderly M, NAD, non toxic appearing  EYES: anicteric sclera, non injected conjunctiva, EOMI  ENT: hearing grossly intact, oropharynx clear, MMM  PSYCH: no agitation, baseline mentation  NERVOUS SYSTEM:  Alert & Oriented X3, CN 2-12 grossly intact  PULMONARY: Clear to percussion bilaterally; No rales, rhonchi, wheezing, or rubs  CARDIOVASCULAR: Regular rate and rhythm; No murmurs, rubs, or gallops  GI: Soft, Nontender, Nondistended; Bowel sounds present  EXTREMITIES:  2+ Peripheral Pulses, No clubbing, cyanosis, or edema  LYMPH: No lymphadenopathy noted  SKIN: No rashes or lesions    Consultant(s) Notes Reviewed:  [x ] YES  [ ] NO    Discussed with Consultants/Other Providers [ x] YES     LABS                          9.6    5.46  )-----------( 200      ( 25 Nov 2021 04:30 )             31.1     11-25    137  |  102  |  75<HH>  ----------------------------<  166<H>  5.0   |  18  |  6.4<HH>    Ca    7.9<L>      25 Nov 2021 04:30  Phos  6.1     11-25  Mg     2.0     11-25    TPro  6.0  /  Alb  3.2<L>  /  TBili  0.2  /  DBili  x   /  AST  17  /  ALT  20  /  AlkPhos  163<H>  11-25    POCT Blood Glucose.: 128 mg/dL (25 Nov 2021 11:26)    RADIOLOGY & ADDITIONAL TESTS:  - no images 11/25  Imaging Personally Reviewed:  [ ] YES  [ ] NO    HEALTH ISSUES - PROBLEM Dx:      MEDICATIONS  (STANDING):  amLODIPine   Tablet 5 milliGRAM(s) Oral daily  apixaban 5 milliGRAM(s) Oral two times a day  atorvastatin 40 milliGRAM(s) Oral at bedtime  BACItracin   Ointment 1 Application(s) Topical two times a day  calcitriol   Capsule 0.5 MICROGram(s) Oral <User Schedule>  calcium acetate 2001 milliGRAM(s) Oral three times a day with meals  clopidogrel Tablet 75 milliGRAM(s) Oral daily  cyanocobalamin 1000 MICROGram(s) Oral daily  dextrose 40% Gel 15 Gram(s) Oral once  dextrose 5%. 1000 milliLiter(s) (50 mL/Hr) IV Continuous <Continuous>  dextrose 5%. 1000 milliLiter(s) (100 mL/Hr) IV Continuous <Continuous>  dextrose 50% Injectable 25 Gram(s) IV Push once  dextrose 50% Injectable 12.5 Gram(s) IV Push once  dextrose 50% Injectable 25 Gram(s) IV Push once  gabapentin 300 milliGRAM(s) Oral daily  glucagon  Injectable 1 milliGRAM(s) IntraMuscular once  influenza   Vaccine 0.5 milliLiter(s) IntraMuscular once  insulin glargine Injectable (LANTUS) 21 Unit(s) SubCutaneous at bedtime  insulin lispro (ADMELOG) corrective regimen sliding scale   SubCutaneous three times a day before meals  insulin lispro Injectable (ADMELOG) 7 Unit(s) SubCutaneous three times a day before meals  multivitamin 1 Tablet(s) Oral daily    MEDICATIONS  (PRN):  simethicone 80 milliGRAM(s) Chew four times a day PRN Dyspepsia

## 2021-11-25 NOTE — PROGRESS NOTE ADULT - SUBJECTIVE AND OBJECTIVE BOX
MARY FOLLOW UP NOTE  --------------------------------------------------------------------------------  Chief Complaint:    24 hour events/subjective:        PAST HISTORY  --------------------------------------------------------------------------------  No significant changes to PMH, PSH, FHx, SHx, unless otherwise noted    ALLERGIES & MEDICATIONS  --------------------------------------------------------------------------------  Allergies    No Known Allergies    Intolerances      Standing Inpatient Medications  amLODIPine   Tablet 5 milliGRAM(s) Oral daily  apixaban 5 milliGRAM(s) Oral two times a day  atorvastatin 40 milliGRAM(s) Oral at bedtime  BACItracin   Ointment 1 Application(s) Topical two times a day  calcitriol   Capsule 0.5 MICROGram(s) Oral <User Schedule>  calcium acetate 2001 milliGRAM(s) Oral three times a day with meals  clopidogrel Tablet 75 milliGRAM(s) Oral daily  cyanocobalamin 1000 MICROGram(s) Oral daily  dextrose 40% Gel 15 Gram(s) Oral once  dextrose 5%. 1000 milliLiter(s) IV Continuous <Continuous>  dextrose 5%. 1000 milliLiter(s) IV Continuous <Continuous>  dextrose 50% Injectable 25 Gram(s) IV Push once  dextrose 50% Injectable 12.5 Gram(s) IV Push once  dextrose 50% Injectable 25 Gram(s) IV Push once  gabapentin 300 milliGRAM(s) Oral daily  glucagon  Injectable 1 milliGRAM(s) IntraMuscular once  influenza   Vaccine 0.5 milliLiter(s) IntraMuscular once  insulin glargine Injectable (LANTUS) 21 Unit(s) SubCutaneous at bedtime  insulin lispro (ADMELOG) corrective regimen sliding scale   SubCutaneous three times a day before meals  insulin lispro Injectable (ADMELOG) 7 Unit(s) SubCutaneous three times a day before meals  multivitamin 1 Tablet(s) Oral daily    PRN Inpatient Medications  simethicone 80 milliGRAM(s) Chew four times a day PRN      REVIEW OF SYSTEMS  --------------------------------------------------------------------------------  Gen: No weight changes, fatigue, fevers/chills, weakness  Skin: No rashes  Head/Eyes/Ears/Mouth: No headache; Normal hearing; Normal vision w/o blurriness; No sinus pain/discomfort, sore throat  Respiratory: No dyspnea, cough, wheezing, hemoptysis  CV: No chest pain, PND, orthopnea  GI: No abdominal pain, diarrhea, constipation, nausea, vomiting, melena, hematochezia  : No increased frequency, dysuria, hematuria, nocturia  MSK: No joint pain/swelling; no back pain; no edema  Neuro: No dizziness/lightheadedness, weakness, seizures, numbness, tingling  Heme: No easy bruising or bleeding  Endo: No heat/cold intolerance  Psych: No significant nervousness, anxiety, stress, depression    All other systems were reviewed and are negative, except as noted.    VITALS/PHYSICAL EXAM  --------------------------------------------------------------------------------  T(C): 36.1 (11-25-21 @ 05:08), Max: 36.6 (11-24-21 @ 22:59)  HR: 96 (11-25-21 @ 05:08) (88 - 96)  BP: 130/60 (11-25-21 @ 05:08) (130/60 - 152/71)  RR: 18 (11-25-21 @ 05:08) (18 - 20)  SpO2: --  Wt(kg): --        11-24-21 @ 07:01  -  11-25-21 @ 07:00  --------------------------------------------------------  IN: 690 mL / OUT: 400 mL / NET: 290 mL      Physical Exam:  	Gen: NAD, well-appearing  	HEENT: PERRL, supple neck, clear oropharynx  	Pulm: CTA B/L  	CV: RRR, S1S2; no rub  	Back: No spinal or CVA tenderness; no sacral edema  	Abd: +BS, soft, nontender/nondistended  	: No suprapubic tenderness  	UE: Warm, FROM, no clubbing, intact strength; no edema; no asterixis  	LE: Warm, FROM, no clubbing, intact strength; no edema  	Neuro: No focal deficits, intact gait  	Psych: Normal affect and mood  	Skin: Warm, without rashes  	Vascular access:    LABS/STUDIES  --------------------------------------------------------------------------------              9.6    5.46  >-----------<  200      [11-25-21 @ 04:30]              31.1     136  |  100  |  61  ----------------------------<  160      [11-24-21 @ 08:50]  4.8   |  19  |  5.3        Ca     8.2     [11-24-21 @ 08:50]      Mg     2.0     [11-24-21 @ 08:50]      Phos  5.9     [11-24-21 @ 08:50]    TPro  6.0  /  Alb  3.2  /  TBili  <0.2  /  DBili  x   /  AST  17  /  ALT  19  /  AlkPhos  163  [11-24-21 @ 08:50]          Creatinine Trend:  SCr 5.3 [11-24 @ 08:50]  SCr 4.9 [11-24 @ 01:00]  SCr 7.2 [11-23 @ 07:36]  SCr 6.3 [11-22 @ 22:09]  SCr 6.2 [11-22 @ 07:04]        PTH -- (Ca 8.1)      [11-16-21 @ 10:33]   512    HBsAb Nonreact      [11-13-21 @ 09:30]  HBsAg Nonreact      [11-08-21 @ 15:44]  HCV 0.12, Nonreact      [11-08-21 @ 15:44]

## 2021-11-26 VITALS — DIASTOLIC BLOOD PRESSURE: 71 MMHG | HEART RATE: 82 BPM | RESPIRATION RATE: 18 BRPM | SYSTOLIC BLOOD PRESSURE: 134 MMHG

## 2021-11-26 LAB
ALBUMIN SERPL ELPH-MCNC: 3.3 G/DL — LOW (ref 3.5–5.2)
ALP SERPL-CCNC: 145 U/L — HIGH (ref 30–115)
ALT FLD-CCNC: 19 U/L — SIGNIFICANT CHANGE UP (ref 0–41)
ANION GAP SERPL CALC-SCNC: 17 MMOL/L — HIGH (ref 7–14)
AST SERPL-CCNC: 15 U/L — SIGNIFICANT CHANGE UP (ref 0–41)
BASOPHILS # BLD AUTO: 0.04 K/UL — SIGNIFICANT CHANGE UP (ref 0–0.2)
BASOPHILS NFR BLD AUTO: 0.7 % — SIGNIFICANT CHANGE UP (ref 0–1)
BILIRUB SERPL-MCNC: <0.2 MG/DL — SIGNIFICANT CHANGE UP (ref 0.2–1.2)
BUN SERPL-MCNC: 89 MG/DL — CRITICAL HIGH (ref 10–20)
CALCIUM SERPL-MCNC: 8.4 MG/DL — LOW (ref 8.5–10.1)
CHLORIDE SERPL-SCNC: 104 MMOL/L — SIGNIFICANT CHANGE UP (ref 98–110)
CO2 SERPL-SCNC: 19 MMOL/L — SIGNIFICANT CHANGE UP (ref 17–32)
CREAT SERPL-MCNC: 6.9 MG/DL — CRITICAL HIGH (ref 0.7–1.5)
EOSINOPHIL # BLD AUTO: 0.4 K/UL — SIGNIFICANT CHANGE UP (ref 0–0.7)
EOSINOPHIL NFR BLD AUTO: 6.9 % — SIGNIFICANT CHANGE UP (ref 0–8)
GLUCOSE BLDC GLUCOMTR-MCNC: 130 MG/DL — HIGH (ref 70–99)
GLUCOSE BLDC GLUCOMTR-MCNC: 135 MG/DL — HIGH (ref 70–99)
GLUCOSE BLDC GLUCOMTR-MCNC: 137 MG/DL — HIGH (ref 70–99)
GLUCOSE SERPL-MCNC: 126 MG/DL — HIGH (ref 70–99)
HCT VFR BLD CALC: 29.7 % — LOW (ref 42–52)
HGB BLD-MCNC: 9.4 G/DL — LOW (ref 14–18)
IMM GRANULOCYTES NFR BLD AUTO: 0.3 % — SIGNIFICANT CHANGE UP (ref 0.1–0.3)
IRON SATN MFR SERPL: 29 % — SIGNIFICANT CHANGE UP (ref 15–50)
IRON SATN MFR SERPL: 67 UG/DL — SIGNIFICANT CHANGE UP (ref 35–150)
LYMPHOCYTES # BLD AUTO: 1.39 K/UL — SIGNIFICANT CHANGE UP (ref 1.2–3.4)
LYMPHOCYTES # BLD AUTO: 24 % — SIGNIFICANT CHANGE UP (ref 20.5–51.1)
MAGNESIUM SERPL-MCNC: 2 MG/DL — SIGNIFICANT CHANGE UP (ref 1.8–2.4)
MCHC RBC-ENTMCNC: 28.2 PG — SIGNIFICANT CHANGE UP (ref 27–31)
MCHC RBC-ENTMCNC: 31.6 G/DL — LOW (ref 32–37)
MCV RBC AUTO: 89.2 FL — SIGNIFICANT CHANGE UP (ref 80–94)
MONOCYTES # BLD AUTO: 0.48 K/UL — SIGNIFICANT CHANGE UP (ref 0.1–0.6)
MONOCYTES NFR BLD AUTO: 8.3 % — SIGNIFICANT CHANGE UP (ref 1.7–9.3)
NEUTROPHILS # BLD AUTO: 3.45 K/UL — SIGNIFICANT CHANGE UP (ref 1.4–6.5)
NEUTROPHILS NFR BLD AUTO: 59.8 % — SIGNIFICANT CHANGE UP (ref 42.2–75.2)
NRBC # BLD: 0 /100 WBCS — SIGNIFICANT CHANGE UP (ref 0–0)
PHOSPHATE SERPL-MCNC: 6.5 MG/DL — HIGH (ref 2.1–4.9)
PLATELET # BLD AUTO: 202 K/UL — SIGNIFICANT CHANGE UP (ref 130–400)
POTASSIUM SERPL-MCNC: 5.1 MMOL/L — HIGH (ref 3.5–5)
POTASSIUM SERPL-SCNC: 5.1 MMOL/L — HIGH (ref 3.5–5)
PROT SERPL-MCNC: 6.5 G/DL — SIGNIFICANT CHANGE UP (ref 6–8)
RBC # BLD: 3.33 M/UL — LOW (ref 4.7–6.1)
RBC # FLD: 13.7 % — SIGNIFICANT CHANGE UP (ref 11.5–14.5)
SODIUM SERPL-SCNC: 140 MMOL/L — SIGNIFICANT CHANGE UP (ref 135–146)
TIBC SERPL-MCNC: 229 UG/DL — SIGNIFICANT CHANGE UP (ref 220–430)
UIBC SERPL-MCNC: 162 UG/DL — SIGNIFICANT CHANGE UP (ref 110–370)
WBC # BLD: 5.78 K/UL — SIGNIFICANT CHANGE UP (ref 4.8–10.8)
WBC # FLD AUTO: 5.78 K/UL — SIGNIFICANT CHANGE UP (ref 4.8–10.8)

## 2021-11-26 PROCEDURE — 99239 HOSP IP/OBS DSCHRG MGMT >30: CPT

## 2021-11-26 RX ORDER — AMLODIPINE BESYLATE 2.5 MG/1
1 TABLET ORAL
Qty: 30 | Refills: 0
Start: 2021-11-26 | End: 2021-12-25

## 2021-11-26 RX ORDER — CALCIUM ACETATE 667 MG
1 TABLET ORAL
Qty: 30 | Refills: 0
Start: 2021-11-26 | End: 2021-12-25

## 2021-11-26 RX ADMIN — GABAPENTIN 300 MILLIGRAM(S): 400 CAPSULE ORAL at 13:39

## 2021-11-26 RX ADMIN — CLOPIDOGREL BISULFATE 75 MILLIGRAM(S): 75 TABLET, FILM COATED ORAL at 13:38

## 2021-11-26 RX ADMIN — Medication 7 UNIT(S): at 07:45

## 2021-11-26 RX ADMIN — Medication 7 UNIT(S): at 13:35

## 2021-11-26 RX ADMIN — APIXABAN 5 MILLIGRAM(S): 2.5 TABLET, FILM COATED ORAL at 06:22

## 2021-11-26 RX ADMIN — Medication 2001 MILLIGRAM(S): at 07:45

## 2021-11-26 RX ADMIN — PREGABALIN 1000 MICROGRAM(S): 225 CAPSULE ORAL at 13:36

## 2021-11-26 RX ADMIN — Medication 2001 MILLIGRAM(S): at 13:37

## 2021-11-26 RX ADMIN — AMLODIPINE BESYLATE 5 MILLIGRAM(S): 2.5 TABLET ORAL at 06:22

## 2021-11-26 RX ADMIN — Medication 1 TABLET(S): at 13:38

## 2021-11-26 NOTE — PROGRESS NOTE ADULT - REASON FOR ADMISSION
Inadequate social support

## 2021-11-26 NOTE — PROGRESS NOTE ADULT - SUBJECTIVE AND OBJECTIVE BOX
Location: [address] ([address])  Patient Name: ALEXANDRO MONROE  Age: 56y  Gender: Male      Progress Note  This morning patient was seen and examined at bedside.    Today is hospital day 21d.  Mr. Monroe is doing fine.   He reports he had a good night sleep.   His appetite is adequate, and he denies nausea or vomiting.   He denies any abdominal pain, diarrhea, or constipation. His last bowel movement was soft and was on 11/22.   He is ambulating with a walker and denies any shortness of breath, chest pain, palpitations, or light headedness.   He has no callahan catheter and is on HD.      Vital Signs in the last 24 hours   Vitals Summary T(C): 36.2 (11-26-21 @ 05:34), Max: 36.2 (11-26-21 @ 05:34)  HR: 89 (11-26-21 @ 09:30) (85 - 97)  BP: 142/78 (11-26-21 @ 09:30) (141/66 - 149/72)  RR: 18 (11-26-21 @ 09:30) (18 - 18)  SpO2: --  Vent Data   Intake/ Output   11-25-21 @ 07:01  -  11-26-21 @ 07:00  --------------------------------------------------------  IN: 0 mL / OUT: 400 mL / NET: -400 mL      Physical Exam  * General Appearance: Alert, cooperative, interactive, well-groomed, oriented to time, place, and person, in no acute distress  * Head: Normocephalic, without obvious abnormality, atraumatic  * Thyroid:  No enlargement/tenderness/nodules; no carotid bruit or JVD  * Back: Symmetric, no curvature, ROM normal, no CVA tenderness  * Lungs: Respirations unlabored, Good bilateral air entry, normal breath sounds (Clear to auscultation bilaterally, no audible wheezes, crackles, or rhonchi)  * Heart: Regular Rate and Rhythm, normal S1 and S2, no audible murmur, rub, or gallop  * Abdomen: Symmetric, non-distended, no scar, soft, non-tender, bowel sounds active all four quadrants, no masses, no organomegaly (no hepatosplenomegaly)  * Extremities: Extremities normal, atraumatic, no cyanosis, no lower extremity pitting edema bilaterally, adequate dorsalis pedis pulses  * Pulses: 2+ and symmetric all extremities  * Skin: Skin color, texture, turgor normal, no rashes or lesions  * Lymph nodes: Cervical, supraclavicular, and axillary nodes normal  * Neurologic: CNII-XII intact, normal strength, sensation and reflexes throughout      Investigations   Laboratory Workup  - CBC:                        9.4    5.78  )-----------( 202      ( 26 Nov 2021 10:43 )             29.7     - Chemistry:  11-26    140  |  104  |  x   ----------------------------<  126<H>  5.1<H>   |  19  |  x     Ca    8.4<L>      26 Nov 2021 10:43  Phos  6.5     11-26  Mg     2.0     11-26    TPro  6.5  /  Alb  3.3<L>  /  TBili  <0.2  /  DBili  x   /  AST  15  /  ALT  19  /  AlkPhos  145<H>  11-26      Current Medications  Standing Medications  amLODIPine   Tablet 5 milliGRAM(s) Oral daily  apixaban 5 milliGRAM(s) Oral two times a day  atorvastatin 40 milliGRAM(s) Oral at bedtime  BACItracin   Ointment 1 Application(s) Topical two times a day  calcitriol   Capsule 0.5 MICROGram(s) Oral <User Schedule>  calcium acetate 2001 milliGRAM(s) Oral three times a day with meals  clopidogrel Tablet 75 milliGRAM(s) Oral daily  cyanocobalamin 1000 MICROGram(s) Oral daily  dextrose 40% Gel 15 Gram(s) Oral once  dextrose 5%. 1000 milliLiter(s) (50 mL/Hr) IV Continuous <Continuous>  dextrose 5%. 1000 milliLiter(s) (100 mL/Hr) IV Continuous <Continuous>  dextrose 50% Injectable 25 Gram(s) IV Push once  dextrose 50% Injectable 25 Gram(s) IV Push once  dextrose 50% Injectable 12.5 Gram(s) IV Push once  gabapentin 300 milliGRAM(s) Oral daily  glucagon  Injectable 1 milliGRAM(s) IntraMuscular once  influenza   Vaccine 0.5 milliLiter(s) IntraMuscular once  insulin glargine Injectable (LANTUS) 21 Unit(s) SubCutaneous at bedtime  insulin lispro (ADMELOG) corrective regimen sliding scale   SubCutaneous three times a day before meals  insulin lispro Injectable (ADMELOG) 7 Unit(s) SubCutaneous three times a day before meals  multivitamin 1 Tablet(s) Oral daily    PRN Medications  simethicone 80 milliGRAM(s) Chew four times a day PRN Dyspepsia    Singles Doses Administered  labetalol 100 milliGRAM(s) Oral once  loperamide 2 milliGRAM(s) Oral once PRN  magnesium sulfate  IVPB 2 Gram(s) IV Intermittent once  magnesium sulfate  IVPB 2 Gram(s) IV Intermittent once  sodium zirconium cyclosilicate 5 Gram(s) Oral once  sodium zirconium cyclosilicate 10 Gram(s) Oral once

## 2021-11-26 NOTE — DISCHARGE NOTE NURSING/CASE MANAGEMENT/SOCIAL WORK - NSDCFUADDAPPT_GEN_ALL_CORE_FT
Please follow up with your primary care doctor within 1 week of discharge.  Please follow up with hematologist in 2 weeks to discuss need for long term anticoagulation (eliquis) in setting of history of DVT >15 years ago  Follow up with nephrology for hemodialysis

## 2021-11-26 NOTE — PROGRESS NOTE ADULT - ASSESSMENT
57 yo M with PMH of DM, CAD s/p Stents, ESRD on HD  at [facility] presented from [facility] for eval of agitation. As per NH pt became agitated and refused medications.  Patient stated that he does not want to go back to [facility] and he wants to do HHD     # ESRD on HD TTS  # MBD  # Normocytic anemia / ?iron deficient   # HTN    - next HD today standard bath uf 2 liters as tolerated   - on venofer, iron stores not available- please obtain iron stores.  Pt completed 8 doses of iron sucrose iv, hgb is at goal.  Hold iv iron until iron stores obtained  - phos level above goal, on binders   - low k / renal diet   - BP controlled  - d/c planning

## 2021-11-26 NOTE — DISCHARGE NOTE NURSING/CASE MANAGEMENT/SOCIAL WORK - NSDCPEELIQUIS_GEN_ALL_CORE
Queenie Chávez) Apixaban/Eliquis - Compliance/Apixaban/Eliquis - Dietary Advice/Apixaban/Eliquis - Follow up monitoring/Apixaban/Eliquis - Potential for adverse drug reactions and interactions

## 2021-11-26 NOTE — DISCHARGE NOTE NURSING/CASE MANAGEMENT/SOCIAL WORK - PATIENT PORTAL LINK FT
You can access the FollowMyHealth Patient Portal offered by [facility] by registering at the following website: [email or web address]. By joining [employer]’s FollowMyHealth portal, you will also be able to view your health information using other applications (apps) compatible with our system.

## 2021-11-26 NOTE — PROGRESS NOTE ADULT - ASSESSMENT
Assessment and Plan  Case of a 56 year old male patient with ESRD on HD and other comorbidities who was brought from [place] on 11/05 for agitation and lack of compliance with HD, admitted for placement issues and for HD resumption. Currently hemodynamically stable.      ESRD   HAGMA (High AG Metabolic Acidosis)  Normocytic anemia  * Started on HD every Tuesday, Thursday, and Saturday around 7 weeks ago after a recent [facility] admission  * Normocytic Anemia At goal for ESRD patient  * No iron studies in chart    - Nephrology Team on board  - Will go for HD today 11/26. Will then be discharged to follow up outpatient at [facility] (has a Spot there)  - Trend CMP + P on a daily basis  - Trend AG + CO2: 11/23 AG 20, CO2 17  - Continue calcium acetate 2 tablets QAC   - Trend CBC closely   - Added Iron Studies (with Ferritin) to labs from 11/26 (confirmed by lab at 9400). Holding Venofer post HD pending iron studies. S/P IV Venofer 100mg post HD from 11/05-11/24/2021  - Continue Holding EPO for now      DM   Diabetic neuropathy  * HbA1c: 5.7 11/06/21  - Monitor POCT premeals and at bedtime  - Continue Lantus 21 units at bedtime  - Continue Lispro 7 units TID  - Continue Apidra Sliding Scale A  - Continue gabapentin 300 milliGRAM(s) Oral every 12 hours      History of Vitamin b12 deficiency  * No Vitamin B12 level in chart  - Trend CBC daily  - Continue cyanocobalamin 1000 MICROGram(s) Oral daily      CAD   * s/p Stents  * No troponin sent during this admission  * No lipid profile in chart  - Continue Atorvastatin 40 milliGRAM(s) Oral at bedtime  - Continue clopidogrel Tablet 75 milliGRAM(s) Oral daily      History of DVT  * Diagnosed > 15 eyars ago  * Per patient, he was on coumadin for 15 years --> recently switched to Eliquis (unsure why he is on it indefinitely or whether it is provoked or unprovoked)  * No VA Duplex Venous LE in chart  - Conitnue apixaban 5 milliGRAM(s) Oral two times a day  - Will ask to follow up with PCP once discharged and continue conversation about ongoing need       Hypertension   - Monitor BP closely: 11/23 138/67 mmHg  - Continue Amlodipine 5mg QD      Others  - DVT Prophylaxis: as detailed above  - GI Prophylaxis: not on Pantoprazole 40mg PO QD  - Diet: Renal Restrictions  - Code Status: Full      Barriers to learning: NO  Discharge Planning: Patient will be discharged once stable   Plan was communicated with patient and medical team      Ashley Del Rio MD  PGY - 2 Internal Medicine   [facility]

## 2021-11-26 NOTE — PROGRESS NOTE ADULT - PROVIDER SPECIALTY LIST ADULT
Hospitalist
Internal Medicine
Internal Medicine
Nephrology
Hospitalist
Internal Medicine
Nephrology
Hospitalist
Internal Medicine
Nephrology

## 2021-11-26 NOTE — PROGRESS NOTE ADULT - SUBJECTIVE AND OBJECTIVE BOX
seen and examined   no distress   lying comfortable         PAST HISTORY  --------------------------------------------------------------------------------  No significant changes to PMH, PSH, FHx, SHx, unless otherwise noted    ALLERGIES & MEDICATIONS  --------------------------------------------------------------------------------  Allergies    No Known Allergies    Intolerances      Standing Inpatient Medications  amLODIPine   Tablet 5 milliGRAM(s) Oral daily  apixaban 5 milliGRAM(s) Oral two times a day  atorvastatin 40 milliGRAM(s) Oral at bedtime  BACItracin   Ointment 1 Application(s) Topical two times a day  calcitriol   Capsule 0.5 MICROGram(s) Oral <User Schedule>  calcium acetate 2001 milliGRAM(s) Oral three times a day with meals  clopidogrel Tablet 75 milliGRAM(s) Oral daily  cyanocobalamin 1000 MICROGram(s) Oral daily  dextrose 40% Gel 15 Gram(s) Oral once  dextrose 5%. 1000 milliLiter(s) IV Continuous <Continuous>  dextrose 5%. 1000 milliLiter(s) IV Continuous <Continuous>  dextrose 50% Injectable 25 Gram(s) IV Push once  dextrose 50% Injectable 25 Gram(s) IV Push once  dextrose 50% Injectable 12.5 Gram(s) IV Push once  gabapentin 300 milliGRAM(s) Oral daily  glucagon  Injectable 1 milliGRAM(s) IntraMuscular once  influenza   Vaccine 0.5 milliLiter(s) IntraMuscular once  insulin glargine Injectable (LANTUS) 21 Unit(s) SubCutaneous at bedtime  insulin lispro (ADMELOG) corrective regimen sliding scale   SubCutaneous three times a day before meals  insulin lispro Injectable (ADMELOG) 7 Unit(s) SubCutaneous three times a day before meals  multivitamin 1 Tablet(s) Oral daily    PRN Inpatient Medications  simethicone 80 milliGRAM(s) Chew four times a day PRN            VITALS/PHYSICAL EXAM  --------------------------------------------------------------------------------  T(C): 36.2 (11-26-21 @ 05:34), Max: 36.2 (11-26-21 @ 05:34)  HR: 85 (11-26-21 @ 05:34) (85 - 97)  BP: 141/66 (11-26-21 @ 05:34) (141/66 - 149/72)  RR: 18 (11-26-21 @ 05:34) (18 - 18)  SpO2: --  Wt(kg): --        11-25-21 @ 07:01  -  11-26-21 @ 07:00  --------------------------------------------------------  IN: 0 mL / OUT: 400 mL / NET: -400 mL      Physical Exam:  	Gen: NAD,  	Pulm: CTA B/L  	CV:  S1S2; no rub  	Abd: soft, nontender/nondistended  	Vascular access:tesio     LABS/STUDIES  --------------------------------------------------------------------------------              9.6    5.46  >-----------<  200      [11-25-21 @ 04:30]              31.1     137  |  102  |  75  ----------------------------<  166      [11-25-21 @ 04:30]  5.0   |  18  |  6.4        Ca     7.9     [11-25-21 @ 04:30]      Mg     2.0     [11-25-21 @ 04:30]      Phos  6.1     [11-25-21 @ 04:30]    TPro  6.0  /  Alb  3.2  /  TBili  0.2  /  DBili  x   /  AST  17  /  ALT  20  /  AlkPhos  163  [11-25-21 @ 04:30]      Creatinine Trend:  SCr 6.4 [11-25 @ 04:30]  SCr 5.3 [11-24 @ 08:50]  SCr 4.9 [11-24 @ 01:00]  SCr 7.2 [11-23 @ 07:36]  SCr 6.3 [11-22 @ 22:09]        PTH -- (Ca 8.1)      [11-16-21 @ 10:33]   512    HBsAb Nonreact      [11-13-21 @ 09:30]  HBsAg Nonreact      [11-08-21 @ 15:44]  HCV 0.12, Nonreact      [11-08-21 @ 15:44]

## 2021-11-27 LAB — FERRITIN SERPL-MCNC: 370 NG/ML — SIGNIFICANT CHANGE UP (ref 30–400)

## 2022-02-23 NOTE — DISCHARGE NOTE NURSING/CASE MANAGEMENT/SOCIAL WORK - HAS THE PATIENT USED TOBACCO IN THE PAST 30 DAYS?
No For information on Fall & Injury Prevention, visit: [email or web address] OR  [email or web address] OR  https://www.cdc.gov/steadi/patient.html

## 2023-11-15 ENCOUNTER — INPATIENT (INPATIENT)
Facility: HOSPITAL | Age: 58
LOS: 9 days | Discharge: HOME HEALTH | DRG: 181 | End: 2023-11-25
Attending: STUDENT IN AN ORGANIZED HEALTH CARE EDUCATION/TRAINING PROGRAM | Admitting: INTERNAL MEDICINE
Payer: MEDICAID

## 2023-11-15 VITALS
TEMPERATURE: 96 F | OXYGEN SATURATION: 98 % | WEIGHT: 216.05 LBS | SYSTOLIC BLOOD PRESSURE: 128 MMHG | RESPIRATION RATE: 18 BRPM | HEART RATE: 92 BPM | HEIGHT: 72 IN | DIASTOLIC BLOOD PRESSURE: 58 MMHG

## 2023-11-15 DIAGNOSIS — I96 GANGRENE, NOT ELSEWHERE CLASSIFIED: ICD-10-CM

## 2023-11-15 DIAGNOSIS — Z79.4 LONG TERM (CURRENT) USE OF INSULIN: ICD-10-CM

## 2023-11-15 DIAGNOSIS — N18.6 END STAGE RENAL DISEASE: ICD-10-CM

## 2023-11-15 DIAGNOSIS — I80.10 PHLEBITIS AND THROMBOPHLEBITIS OF UNSPECIFIED FEMORAL VEIN: ICD-10-CM

## 2023-11-15 DIAGNOSIS — Z86.718 PERSONAL HISTORY OF OTHER VENOUS THROMBOSIS AND EMBOLISM: ICD-10-CM

## 2023-11-15 DIAGNOSIS — Z98.890 OTHER SPECIFIED POSTPROCEDURAL STATES: Chronic | ICD-10-CM

## 2023-11-15 DIAGNOSIS — M86.171 OTHER ACUTE OSTEOMYELITIS, RIGHT ANKLE AND FOOT: ICD-10-CM

## 2023-11-15 DIAGNOSIS — Z95.828 PRESENCE OF OTHER VASCULAR IMPLANTS AND GRAFTS: Chronic | ICD-10-CM

## 2023-11-15 DIAGNOSIS — Z79.01 LONG TERM (CURRENT) USE OF ANTICOAGULANTS: ICD-10-CM

## 2023-11-15 DIAGNOSIS — Z99.2 DEPENDENCE ON RENAL DIALYSIS: ICD-10-CM

## 2023-11-15 DIAGNOSIS — Z79.02 LONG TERM (CURRENT) USE OF ANTITHROMBOTICS/ANTIPLATELETS: ICD-10-CM

## 2023-11-15 DIAGNOSIS — E11.52 TYPE 2 DIABETES MELLITUS WITH DIABETIC PERIPHERAL ANGIOPATHY WITH GANGRENE: ICD-10-CM

## 2023-11-15 DIAGNOSIS — I25.10 ATHEROSCLEROTIC HEART DISEASE OF NATIVE CORONARY ARTERY WITHOUT ANGINA PECTORIS: ICD-10-CM

## 2023-11-15 DIAGNOSIS — L97.516 NON-PRESSURE CHRONIC ULCER OF OTHER PART OF RIGHT FOOT WITH BONE INVOLVEMENT WITHOUT EVIDENCE OF NECROSIS: ICD-10-CM

## 2023-11-15 DIAGNOSIS — E11.69 TYPE 2 DIABETES MELLITUS WITH OTHER SPECIFIED COMPLICATION: ICD-10-CM

## 2023-11-15 DIAGNOSIS — D64.9 ANEMIA, UNSPECIFIED: ICD-10-CM

## 2023-11-15 DIAGNOSIS — E11.22 TYPE 2 DIABETES MELLITUS WITH DIABETIC CHRONIC KIDNEY DISEASE: ICD-10-CM

## 2023-11-15 DIAGNOSIS — Z87.891 PERSONAL HISTORY OF NICOTINE DEPENDENCE: ICD-10-CM

## 2023-11-15 DIAGNOSIS — I12.0 HYPERTENSIVE CHRONIC KIDNEY DISEASE WITH STAGE 5 CHRONIC KIDNEY DISEASE OR END STAGE RENAL DISEASE: ICD-10-CM

## 2023-11-15 PROBLEM — E11.9 TYPE 2 DIABETES MELLITUS WITHOUT COMPLICATIONS: Chronic | Status: ACTIVE | Noted: 2021-11-05

## 2023-11-15 PROBLEM — I82.409 ACUTE EMBOLISM AND THROMBOSIS OF UNSPECIFIED DEEP VEINS OF UNSPECIFIED LOWER EXTREMITY: Chronic | Status: ACTIVE | Noted: 2021-11-05

## 2023-11-15 LAB
ALBUMIN SERPL ELPH-MCNC: 4 G/DL — SIGNIFICANT CHANGE UP (ref 3.5–5.2)
ALBUMIN SERPL ELPH-MCNC: 4 G/DL — SIGNIFICANT CHANGE UP (ref 3.5–5.2)
ALP SERPL-CCNC: 120 U/L — HIGH (ref 30–115)
ALP SERPL-CCNC: 120 U/L — HIGH (ref 30–115)
ALT FLD-CCNC: 9 U/L — SIGNIFICANT CHANGE UP (ref 0–41)
ALT FLD-CCNC: 9 U/L — SIGNIFICANT CHANGE UP (ref 0–41)
ANION GAP SERPL CALC-SCNC: 19 MMOL/L — HIGH (ref 7–14)
ANION GAP SERPL CALC-SCNC: 19 MMOL/L — HIGH (ref 7–14)
AST SERPL-CCNC: 7 U/L — SIGNIFICANT CHANGE UP (ref 0–41)
AST SERPL-CCNC: 7 U/L — SIGNIFICANT CHANGE UP (ref 0–41)
BASOPHILS # BLD AUTO: 0.05 K/UL — SIGNIFICANT CHANGE UP (ref 0–0.2)
BASOPHILS # BLD AUTO: 0.05 K/UL — SIGNIFICANT CHANGE UP (ref 0–0.2)
BASOPHILS NFR BLD AUTO: 0.5 % — SIGNIFICANT CHANGE UP (ref 0–1)
BASOPHILS NFR BLD AUTO: 0.5 % — SIGNIFICANT CHANGE UP (ref 0–1)
BILIRUB SERPL-MCNC: 0.3 MG/DL — SIGNIFICANT CHANGE UP (ref 0.2–1.2)
BILIRUB SERPL-MCNC: 0.3 MG/DL — SIGNIFICANT CHANGE UP (ref 0.2–1.2)
BUN SERPL-MCNC: 54 MG/DL — HIGH (ref 10–20)
BUN SERPL-MCNC: 54 MG/DL — HIGH (ref 10–20)
CALCIUM SERPL-MCNC: 9.1 MG/DL — SIGNIFICANT CHANGE UP (ref 8.4–10.5)
CALCIUM SERPL-MCNC: 9.1 MG/DL — SIGNIFICANT CHANGE UP (ref 8.4–10.5)
CHLORIDE SERPL-SCNC: 101 MMOL/L — SIGNIFICANT CHANGE UP (ref 98–110)
CHLORIDE SERPL-SCNC: 101 MMOL/L — SIGNIFICANT CHANGE UP (ref 98–110)
CO2 SERPL-SCNC: 20 MMOL/L — SIGNIFICANT CHANGE UP (ref 17–32)
CO2 SERPL-SCNC: 20 MMOL/L — SIGNIFICANT CHANGE UP (ref 17–32)
CREAT SERPL-MCNC: 8.7 MG/DL — CRITICAL HIGH (ref 0.7–1.5)
CREAT SERPL-MCNC: 8.7 MG/DL — CRITICAL HIGH (ref 0.7–1.5)
EGFR: 7 ML/MIN/1.73M2 — LOW
EGFR: 7 ML/MIN/1.73M2 — LOW
EOSINOPHIL # BLD AUTO: 0.62 K/UL — SIGNIFICANT CHANGE UP (ref 0–0.7)
EOSINOPHIL # BLD AUTO: 0.62 K/UL — SIGNIFICANT CHANGE UP (ref 0–0.7)
EOSINOPHIL NFR BLD AUTO: 6.5 % — SIGNIFICANT CHANGE UP (ref 0–8)
EOSINOPHIL NFR BLD AUTO: 6.5 % — SIGNIFICANT CHANGE UP (ref 0–8)
GLUCOSE BLDC GLUCOMTR-MCNC: 413 MG/DL — HIGH (ref 70–99)
GLUCOSE BLDC GLUCOMTR-MCNC: 413 MG/DL — HIGH (ref 70–99)
GLUCOSE SERPL-MCNC: 185 MG/DL — HIGH (ref 70–99)
GLUCOSE SERPL-MCNC: 185 MG/DL — HIGH (ref 70–99)
HCT VFR BLD CALC: 35.2 % — LOW (ref 42–52)
HCT VFR BLD CALC: 35.2 % — LOW (ref 42–52)
HGB BLD-MCNC: 11 G/DL — LOW (ref 14–18)
HGB BLD-MCNC: 11 G/DL — LOW (ref 14–18)
IMM GRANULOCYTES NFR BLD AUTO: 0.2 % — SIGNIFICANT CHANGE UP (ref 0.1–0.3)
IMM GRANULOCYTES NFR BLD AUTO: 0.2 % — SIGNIFICANT CHANGE UP (ref 0.1–0.3)
LACTATE SERPL-SCNC: 1.8 MMOL/L — SIGNIFICANT CHANGE UP (ref 0.7–2)
LACTATE SERPL-SCNC: 1.8 MMOL/L — SIGNIFICANT CHANGE UP (ref 0.7–2)
LYMPHOCYTES # BLD AUTO: 1.73 K/UL — SIGNIFICANT CHANGE UP (ref 1.2–3.4)
LYMPHOCYTES # BLD AUTO: 1.73 K/UL — SIGNIFICANT CHANGE UP (ref 1.2–3.4)
LYMPHOCYTES # BLD AUTO: 18 % — LOW (ref 20.5–51.1)
LYMPHOCYTES # BLD AUTO: 18 % — LOW (ref 20.5–51.1)
MCHC RBC-ENTMCNC: 27.6 PG — SIGNIFICANT CHANGE UP (ref 27–31)
MCHC RBC-ENTMCNC: 27.6 PG — SIGNIFICANT CHANGE UP (ref 27–31)
MCHC RBC-ENTMCNC: 31.3 G/DL — LOW (ref 32–37)
MCHC RBC-ENTMCNC: 31.3 G/DL — LOW (ref 32–37)
MCV RBC AUTO: 88.2 FL — SIGNIFICANT CHANGE UP (ref 80–94)
MCV RBC AUTO: 88.2 FL — SIGNIFICANT CHANGE UP (ref 80–94)
MONOCYTES # BLD AUTO: 0.61 K/UL — HIGH (ref 0.1–0.6)
MONOCYTES # BLD AUTO: 0.61 K/UL — HIGH (ref 0.1–0.6)
MONOCYTES NFR BLD AUTO: 6.4 % — SIGNIFICANT CHANGE UP (ref 1.7–9.3)
MONOCYTES NFR BLD AUTO: 6.4 % — SIGNIFICANT CHANGE UP (ref 1.7–9.3)
NEUTROPHILS # BLD AUTO: 6.57 K/UL — HIGH (ref 1.4–6.5)
NEUTROPHILS # BLD AUTO: 6.57 K/UL — HIGH (ref 1.4–6.5)
NEUTROPHILS NFR BLD AUTO: 68.4 % — SIGNIFICANT CHANGE UP (ref 42.2–75.2)
NEUTROPHILS NFR BLD AUTO: 68.4 % — SIGNIFICANT CHANGE UP (ref 42.2–75.2)
NRBC # BLD: 0 /100 WBCS — SIGNIFICANT CHANGE UP (ref 0–0)
NRBC # BLD: 0 /100 WBCS — SIGNIFICANT CHANGE UP (ref 0–0)
PLATELET # BLD AUTO: 187 K/UL — SIGNIFICANT CHANGE UP (ref 130–400)
PLATELET # BLD AUTO: 187 K/UL — SIGNIFICANT CHANGE UP (ref 130–400)
PMV BLD: 10.3 FL — SIGNIFICANT CHANGE UP (ref 7.4–10.4)
PMV BLD: 10.3 FL — SIGNIFICANT CHANGE UP (ref 7.4–10.4)
POTASSIUM SERPL-MCNC: 5 MMOL/L — SIGNIFICANT CHANGE UP (ref 3.5–5)
POTASSIUM SERPL-MCNC: 5 MMOL/L — SIGNIFICANT CHANGE UP (ref 3.5–5)
POTASSIUM SERPL-SCNC: 5 MMOL/L — SIGNIFICANT CHANGE UP (ref 3.5–5)
POTASSIUM SERPL-SCNC: 5 MMOL/L — SIGNIFICANT CHANGE UP (ref 3.5–5)
PROT SERPL-MCNC: 7 G/DL — SIGNIFICANT CHANGE UP (ref 6–8)
PROT SERPL-MCNC: 7 G/DL — SIGNIFICANT CHANGE UP (ref 6–8)
RBC # BLD: 3.99 M/UL — LOW (ref 4.7–6.1)
RBC # BLD: 3.99 M/UL — LOW (ref 4.7–6.1)
RBC # FLD: 14.7 % — HIGH (ref 11.5–14.5)
RBC # FLD: 14.7 % — HIGH (ref 11.5–14.5)
SODIUM SERPL-SCNC: 140 MMOL/L — SIGNIFICANT CHANGE UP (ref 135–146)
SODIUM SERPL-SCNC: 140 MMOL/L — SIGNIFICANT CHANGE UP (ref 135–146)
WBC # BLD: 9.6 K/UL — SIGNIFICANT CHANGE UP (ref 4.8–10.8)
WBC # BLD: 9.6 K/UL — SIGNIFICANT CHANGE UP (ref 4.8–10.8)
WBC # FLD AUTO: 9.6 K/UL — SIGNIFICANT CHANGE UP (ref 4.8–10.8)
WBC # FLD AUTO: 9.6 K/UL — SIGNIFICANT CHANGE UP (ref 4.8–10.8)

## 2023-11-15 PROCEDURE — 80048 BASIC METABOLIC PNL TOTAL CA: CPT

## 2023-11-15 PROCEDURE — 86901 BLOOD TYPING SEROLOGIC RH(D): CPT

## 2023-11-15 PROCEDURE — 85652 RBC SED RATE AUTOMATED: CPT

## 2023-11-15 PROCEDURE — 87640 STAPH A DNA AMP PROBE: CPT

## 2023-11-15 PROCEDURE — C1725: CPT

## 2023-11-15 PROCEDURE — 85610 PROTHROMBIN TIME: CPT

## 2023-11-15 PROCEDURE — 83735 ASSAY OF MAGNESIUM: CPT

## 2023-11-15 PROCEDURE — 37224: CPT | Mod: RT

## 2023-11-15 PROCEDURE — 87641 MR-STAPH DNA AMP PROBE: CPT

## 2023-11-15 PROCEDURE — 73630 X-RAY EXAM OF FOOT: CPT | Mod: 26,RT

## 2023-11-15 PROCEDURE — C1760: CPT

## 2023-11-15 PROCEDURE — 75716 ARTERY X-RAYS ARMS/LEGS: CPT | Mod: 59

## 2023-11-15 PROCEDURE — 90935 HEMODIALYSIS ONE EVALUATION: CPT

## 2023-11-15 PROCEDURE — C1894: CPT

## 2023-11-15 PROCEDURE — 93925 LOWER EXTREMITY STUDY: CPT | Mod: 26

## 2023-11-15 PROCEDURE — 99285 EMERGENCY DEPT VISIT HI MDM: CPT

## 2023-11-15 PROCEDURE — 80053 COMPREHEN METABOLIC PANEL: CPT

## 2023-11-15 PROCEDURE — 82962 GLUCOSE BLOOD TEST: CPT

## 2023-11-15 PROCEDURE — 86850 RBC ANTIBODY SCREEN: CPT

## 2023-11-15 PROCEDURE — 83036 HEMOGLOBIN GLYCOSYLATED A1C: CPT

## 2023-11-15 PROCEDURE — 85025 COMPLETE CBC W/AUTO DIFF WBC: CPT

## 2023-11-15 PROCEDURE — C1887: CPT

## 2023-11-15 PROCEDURE — 84100 ASSAY OF PHOSPHORUS: CPT

## 2023-11-15 PROCEDURE — 93925 LOWER EXTREMITY STUDY: CPT

## 2023-11-15 PROCEDURE — C1769: CPT

## 2023-11-15 PROCEDURE — 85730 THROMBOPLASTIN TIME PARTIAL: CPT

## 2023-11-15 PROCEDURE — 93306 TTE W/DOPPLER COMPLETE: CPT

## 2023-11-15 PROCEDURE — 36415 COLL VENOUS BLD VENIPUNCTURE: CPT

## 2023-11-15 PROCEDURE — 86900 BLOOD TYPING SEROLOGIC ABO: CPT

## 2023-11-15 PROCEDURE — 99223 1ST HOSP IP/OBS HIGH 75: CPT

## 2023-11-15 PROCEDURE — 82010 KETONE BODYS QUAN: CPT

## 2023-11-15 PROCEDURE — 76937 US GUIDE VASCULAR ACCESS: CPT

## 2023-11-15 PROCEDURE — 86140 C-REACTIVE PROTEIN: CPT

## 2023-11-15 PROCEDURE — 85027 COMPLETE CBC AUTOMATED: CPT

## 2023-11-15 RX ORDER — SITAGLIPTIN 50 MG/1
1 TABLET, FILM COATED ORAL
Qty: 0 | Refills: 0 | DISCHARGE

## 2023-11-15 RX ORDER — INSULIN LISPRO 100/ML
VIAL (ML) SUBCUTANEOUS
Refills: 0 | Status: DISCONTINUED | OUTPATIENT
Start: 2023-11-15 | End: 2023-11-25

## 2023-11-15 RX ORDER — DEXTROSE 50 % IN WATER 50 %
12.5 SYRINGE (ML) INTRAVENOUS ONCE
Refills: 0 | Status: DISCONTINUED | OUTPATIENT
Start: 2023-11-15 | End: 2023-11-25

## 2023-11-15 RX ORDER — ACETAMINOPHEN 500 MG
650 TABLET ORAL EVERY 6 HOURS
Refills: 0 | Status: DISCONTINUED | OUTPATIENT
Start: 2023-11-15 | End: 2023-11-23

## 2023-11-15 RX ORDER — AZTREONAM 2 G
2000 VIAL (EA) INJECTION ONCE
Refills: 0 | Status: COMPLETED | OUTPATIENT
Start: 2023-11-15 | End: 2023-11-15

## 2023-11-15 RX ORDER — METRONIDAZOLE 500 MG
500 TABLET ORAL ONCE
Refills: 0 | Status: COMPLETED | OUTPATIENT
Start: 2023-11-15 | End: 2023-11-15

## 2023-11-15 RX ORDER — PIPERACILLIN AND TAZOBACTAM 4; .5 G/20ML; G/20ML
3.38 INJECTION, POWDER, LYOPHILIZED, FOR SOLUTION INTRAVENOUS ONCE
Refills: 0 | Status: COMPLETED | OUTPATIENT
Start: 2023-11-16 | End: 2023-11-16

## 2023-11-15 RX ORDER — DEXTROSE 50 % IN WATER 50 %
25 SYRINGE (ML) INTRAVENOUS ONCE
Refills: 0 | Status: DISCONTINUED | OUTPATIENT
Start: 2023-11-15 | End: 2023-11-25

## 2023-11-15 RX ORDER — PREGABALIN 225 MG/1
1000 CAPSULE ORAL DAILY
Refills: 0 | Status: DISCONTINUED | OUTPATIENT
Start: 2023-11-15 | End: 2023-11-25

## 2023-11-15 RX ORDER — INSULIN LISPRO 100/ML
8 VIAL (ML) SUBCUTANEOUS ONCE
Refills: 0 | Status: COMPLETED | OUTPATIENT
Start: 2023-11-15 | End: 2023-11-16

## 2023-11-15 RX ORDER — SODIUM CHLORIDE 9 MG/ML
1000 INJECTION, SOLUTION INTRAVENOUS
Refills: 0 | Status: DISCONTINUED | OUTPATIENT
Start: 2023-11-15 | End: 2023-11-25

## 2023-11-15 RX ORDER — INSULIN LISPRO 100/ML
VIAL (ML) SUBCUTANEOUS AT BEDTIME
Refills: 0 | Status: DISCONTINUED | OUTPATIENT
Start: 2023-11-15 | End: 2023-11-25

## 2023-11-15 RX ORDER — LANOLIN ALCOHOL/MO/W.PET/CERES
3 CREAM (GRAM) TOPICAL AT BEDTIME
Refills: 0 | Status: DISCONTINUED | OUTPATIENT
Start: 2023-11-15 | End: 2023-11-25

## 2023-11-15 RX ORDER — GABAPENTIN 400 MG/1
300 CAPSULE ORAL
Refills: 0 | Status: DISCONTINUED | OUTPATIENT
Start: 2023-11-15 | End: 2023-11-25

## 2023-11-15 RX ORDER — CHLORHEXIDINE GLUCONATE 213 G/1000ML
1 SOLUTION TOPICAL
Refills: 0 | Status: DISCONTINUED | OUTPATIENT
Start: 2023-11-15 | End: 2023-11-25

## 2023-11-15 RX ORDER — ATORVASTATIN CALCIUM 80 MG/1
1 TABLET, FILM COATED ORAL
Qty: 0 | Refills: 0 | DISCHARGE

## 2023-11-15 RX ORDER — DIPHENHYDRAMINE HCL 50 MG
50 CAPSULE ORAL ONCE
Refills: 0 | Status: COMPLETED | OUTPATIENT
Start: 2023-11-15 | End: 2023-11-15

## 2023-11-15 RX ORDER — MIDODRINE HYDROCHLORIDE 2.5 MG/1
10 TABLET ORAL THREE TIMES A DAY
Refills: 0 | Status: DISCONTINUED | OUTPATIENT
Start: 2023-11-15 | End: 2023-11-19

## 2023-11-15 RX ORDER — ONDANSETRON 8 MG/1
4 TABLET, FILM COATED ORAL EVERY 8 HOURS
Refills: 0 | Status: DISCONTINUED | OUTPATIENT
Start: 2023-11-15 | End: 2023-11-23

## 2023-11-15 RX ORDER — INSULIN DEGLUDEC 100 U/ML
3 INJECTION, SOLUTION SUBCUTANEOUS
Refills: 0 | DISCHARGE

## 2023-11-15 RX ORDER — SIMETHICONE 80 MG/1
1 TABLET, CHEWABLE ORAL
Qty: 0 | Refills: 0 | DISCHARGE

## 2023-11-15 RX ORDER — PIPERACILLIN AND TAZOBACTAM 4; .5 G/20ML; G/20ML
3.38 INJECTION, POWDER, LYOPHILIZED, FOR SOLUTION INTRAVENOUS ONCE
Refills: 0 | Status: COMPLETED | OUTPATIENT
Start: 2023-11-15 | End: 2023-11-15

## 2023-11-15 RX ORDER — GLUCAGON INJECTION, SOLUTION 0.5 MG/.1ML
1 INJECTION, SOLUTION SUBCUTANEOUS ONCE
Refills: 0 | Status: DISCONTINUED | OUTPATIENT
Start: 2023-11-15 | End: 2023-11-25

## 2023-11-15 RX ORDER — PIPERACILLIN AND TAZOBACTAM 4; .5 G/20ML; G/20ML
3.38 INJECTION, POWDER, LYOPHILIZED, FOR SOLUTION INTRAVENOUS EVERY 12 HOURS
Refills: 0 | Status: DISCONTINUED | OUTPATIENT
Start: 2023-11-16 | End: 2023-11-16

## 2023-11-15 RX ORDER — CLOPIDOGREL BISULFATE 75 MG/1
1 TABLET, FILM COATED ORAL
Qty: 0 | Refills: 0 | DISCHARGE

## 2023-11-15 RX ORDER — CALCITRIOL 0.5 UG/1
1 CAPSULE ORAL
Qty: 0 | Refills: 0 | DISCHARGE

## 2023-11-15 RX ORDER — DEXTROSE 50 % IN WATER 50 %
15 SYRINGE (ML) INTRAVENOUS ONCE
Refills: 0 | Status: DISCONTINUED | OUTPATIENT
Start: 2023-11-15 | End: 2023-11-25

## 2023-11-15 RX ORDER — MEROPENEM 1 G/30ML
1000 INJECTION INTRAVENOUS EVERY 24 HOURS
Refills: 0 | Status: DISCONTINUED | OUTPATIENT
Start: 2023-11-15 | End: 2023-11-15

## 2023-11-15 RX ORDER — VANCOMYCIN HCL 1 G
1000 VIAL (EA) INTRAVENOUS ONCE
Refills: 0 | Status: COMPLETED | OUTPATIENT
Start: 2023-11-15 | End: 2023-11-15

## 2023-11-15 RX ADMIN — Medication 250 MILLIGRAM(S): at 16:10

## 2023-11-15 RX ADMIN — Medication 50 MILLIGRAM(S): at 18:21

## 2023-11-15 RX ADMIN — Medication 125 MILLIGRAM(S): at 18:21

## 2023-11-15 RX ADMIN — PREGABALIN 1000 MICROGRAM(S): 225 CAPSULE ORAL at 19:53

## 2023-11-15 RX ADMIN — PIPERACILLIN AND TAZOBACTAM 200 GRAM(S): 4; .5 INJECTION, POWDER, LYOPHILIZED, FOR SOLUTION INTRAVENOUS at 23:04

## 2023-11-15 RX ADMIN — Medication 100 MILLIGRAM(S): at 16:10

## 2023-11-15 RX ADMIN — GABAPENTIN 300 MILLIGRAM(S): 400 CAPSULE ORAL at 19:53

## 2023-11-15 RX ADMIN — Medication 50 MILLIGRAM(S): at 17:07

## 2023-11-15 NOTE — PATIENT PROFILE ADULT - FALL HARM RISK - HARM RISK INTERVENTIONS
Communicate Risk of Fall with Harm to all staff/Reinforce activity limits and safety measures with patient and family/Tailored Fall Risk Interventions/Visual Cue: Yellow wristband and red socks/Bed in lowest position, wheels locked, appropriate side rails in place/Call bell, personal items and telephone in reach/Instruct patient to call for assistance before getting out of bed or chair/Non-slip footwear when patient is out of bed/[place] to call system/Physically safe environment - no spills, clutter or unnecessary equipment/Purposeful Proactive Rounding/Room/bathroom lighting operational, light cord in reach Assistance OOB with selected safe patient handling equipment/Communicate Risk of Fall with Harm to all staff/Discuss with provider need for PT consult/Monitor gait and stability/Provide patient with walking aids - walker, cane, crutches/Reinforce activity limits and safety measures with patient and family/Tailored Fall Risk Interventions/Visual Cue: Yellow wristband and red socks/Bed in lowest position, wheels locked, appropriate side rails in place/Call bell, personal items and telephone in reach/Instruct patient to call for assistance before getting out of bed or chair/Non-slip footwear when patient is out of bed/[place] to call system/Physically safe environment - no spills, clutter or unnecessary equipment/Purposeful Proactive Rounding/Room/bathroom lighting operational, light cord in reach

## 2023-11-15 NOTE — ED PROVIDER NOTE - WR ORDER ID 1
Patient called back and said he will be arriving on 04/11 in the afternoon and the suggested appointment at 8:45am will not work for him but anything on 04/12 or 04/13 will work. Please call patient back to discuss and advise.    [id number]

## 2023-11-15 NOTE — H&P ADULT - NS ATTEND AMEND GEN_ALL_CORE FT
No amendments necessary. Amended where necessary; specifically changes were made to antibiotic regimen during the admission process.

## 2023-11-15 NOTE — CHART NOTE - NSCHARTNOTEFT_GEN_A_CORE
received a call from ID who recommended zosyn if patient tolerated PCN in the past, and Levauin if not    patient reports that he tolerated PCN well in the past   will start patient on zosyn as per renal function

## 2023-11-15 NOTE — H&P ADULT - NSHPREVIEWOFSYSTEMS_GEN_ALL_CORE
REVIEW OF SYSTEMS:    CONSTITUTIONAL:  No weakness, fevers or chills  EYES/ENT:  No visual changes;  No vertigo or throat pain   NECK:  No pain or stiffness  RESPIRATORY:  No cough, wheezing, hemoptysis; No shortness of breath  CARDIOVASCULAR:  No chest pain or palpitations  GASTROINTESTINAL:  No abdominal or epigastric pain. No nausea, vomiting, or hematemesis; No diarrhea or constipation. No melena or hematochezia.  GENITOURINARY:  No dysuria, frequency or hematuria  MUSCULOSKELETAL:  (+) Gangrene Right 3rd and 4th toes.  + Chronic venous stasis changes B/L lower extremities,  No calf tenderness  NEUROLOGICAL:  No numbness or weakness  SKIN:  No itching, rashes

## 2023-11-15 NOTE — H&P ADULT - ASSESSMENT
.  Impression:  Patient is a 58 year old male with past medical history of Type 2 diabetes (now treated with Insulin), Bilateral lower extremity DVT (on Eliquis),  ESRD (HD on Tues/Thurs/Sat), CAD, morbid obesity, PVD, who now presents in the ER sent in by his podiatrist Dr. Dudley due to gangrenous Right 3rd and 4th toes.    Patient now with Infected Gangrenous Right 3rd and 4th toes.          Plan:  # Infected Gangrenous Right 3rd and 4th toes with Osteomyelitis.  - Admit to medical service under Dr. Nair.  - Low Carb / DASH diet and then NPO after midnight for possible surgery in am.  - Ivabx:  Podiatry wanted Meropenum but Infectious disease  will not approve Meropenum, and ID recommends to use Zosyn.    - Per Podiatry recommendations:     1. Xray right foot. (Done in ER earlier).     2. Pre-op labs:  CBC, BMP,, PT/PTT, HgA1c, ESR, CRP, T&S.     3. Wound care with Betadine and DSD to right foot.     4. Wound culture from right foot.     5. Vascular consult with Dr. Lemus.(Per podiatry recommendation)      6. Infectious disease consult.     7. Renal consult for hemodialysis  (Tues/Thurs/Sat)     8. Podiatry consult with Dr. Dudley.   - Case d/w Dr. Nair and agrees with above plan.        # Diabetes:  - Hold standing Insulin since will be NPO after midnight for surgery in am.  - Low carbohydrate / DASH diet.  NPO after midnight.    - Monitor fingersticks.   - Lispro sliding scale PRN.        # ESRD:  - Renal consult for Hemodialysis on Tues / Thurs / Saturdays.   - Continue Nephro caps.        # History of DVT Bilateral lower extremities / VTE prophylaxis:  - Patient on Eliquis 5 mg BID, But will hold since for surgery tomorrow for Right toe amputation.   - Hold anticoagulation for surgery tomorrow.          # CAD:  - Stable.  - Patient reports his Plavix and Atorvastatin stopped.        .  Impression:  Patient is a 58 year old male with past medical history of Type 2 diabetes (now treated with Insulin), Bilateral lower extremity DVT (on Eliquis),  ESRD (HD on Tues/Thurs/Sat), CAD, morbid obesity, PVD, who now presents in the ER sent in by his podiatrist Dr. Dudley due to gangrenous Right 3rd and 4th toes.    Patient now with Infected Gangrenous Right 3rd and 4th toes.          Plan:  # Infected Gangrenous Right 3rd and 4th toes with Osteomyelitis.  - Admit to medical service under Dr. Nair.  - Low Carb / DASH diet and then NPO after midnight for possible surgery in am.  - Ivabx:  Podiatry wanted Meropenum but Infectious disease  will not approve Meropenum.    - Per Podiatry recommendations:     1. Xray right foot. (Done in ER earlier).     2. Pre-op labs:  CBC, BMP,, PT/PTT, HgA1c, ESR, CRP, T&S.     3. Wound care with Betadine and DSD to right foot.     4. Wound culture from right foot.     5. Vascular consult with Dr. Lemus.(Per podiatry recommendation)      6. Infectious disease consult.     7. Renal consult for hemodialysis  (Tues/Thurs/Sat)     8. Podiatry consult with Dr. Dudley.   -Patient had allergic reaction to Azactam in ED; will give one dose Levaquin, pt was given 1 dose vancomycin in ED  -Patient is asymptomatic from a cardiopulmonary standpoint so he is medically optimized at this point; further risk stratification will be as per cardiology      # Diabetes:  - Hold standing Insulin since will be NPO after midnight for surgery in am.  - Low carbohydrate / DASH diet.  NPO after midnight.    - Monitor fingersticks.   - Lispro sliding scale PRN.        # ESRD:  - Renal consult for Hemodialysis on Tues / Thurs / Saturdays.   - Continue Nephro caps.        # History of DVT Bilateral lower extremities / VTE prophylaxis:  - Patient on Eliquis 5 mg BID, But will hold since for surgery tomorrow for Right toe amputation.   - Hold anticoagulation for surgery tomorrow as bleeding risk outweighs VTE risk at this point          # CAD:  - Stable.  - Patient reports his Plavix and Atorvastatin stopped.

## 2023-11-15 NOTE — ED PROVIDER NOTE - PHYSICAL EXAMINATION
GEN: Patient in no acute distress  MS: Normal ROM in all extremities. No midline spinal tenderness. pulses 2 +. no calf tenderness or swelling.  SKIN: right 3rd toe gangrenous, Warm, dry, no acute rashes. Good turgor  NEURO: Strength 5/5 with no sensory deficits. Steady gait.

## 2023-11-15 NOTE — ED PROVIDER NOTE - PROGRESS NOTE DETAILS
spoke with podiatry, sent in by aishwarya fraga for iv axbx, medical clearance and will be scheduled for amputation

## 2023-11-15 NOTE — ED PROVIDER NOTE - OBJECTIVE STATEMENT
58 year old male with pmhx of of DM, dvt on eliquis, End stage renal disease on hb tues thurs sat presents to ed with right 3rd toe ulcer. Pt denies fever, chills, paresthesias, or weakness to extremity, or trauma

## 2023-11-15 NOTE — H&P ADULT - NSICDXPASTMEDICALHX_GEN_ALL_CORE_FT
PAST MEDICAL HISTORY:  CAD (coronary artery disease)     DM (diabetes mellitus)     DVT (deep venous thrombosis)     ESRD on dialysis     Morbid obesity     Peripheral vascular disease

## 2023-11-15 NOTE — H&P ADULT - NSHPPHYSICALEXAM_GEN_ALL_CORE
PHYSICAL EXAM:    General:  WD, Morbidly obese male in NAD.    Skin:  + Right 3rd and 4th toe gangrenous toes, + serous drainage, malodorous.  No rash, no pressure ulcers.    Eyes:  PERRLA, EOM intact.    Neck:  No tenderness.    Back:  No spinal tenderness.    Respiratory:  CTA B/L, No wheezes, rales or rhonchi.    Cardiovascular:  S1 & S2, RRR, no murmurs, rubs or gallops.     Gastrointestinal:   Soft, No distention, Protuberant, Non-tender, No rebound, guarding or Peritoneal signs.    Genitourinary:  No suprapubic tenderness,  No CVA tenderness.    Extremities:  + Right 3rd and 4th toe gangrenous and malodorous, + serous drainage, malodorous.  + Venous stasis changes B/L legs.  Normal rom,     Vascular:  + Right 3rd and 4th toe gangrenous toes.  + Palpable pulse (Right dorsalis pedis faint to palpation and Left Dorsalis pedis 1+),   No calf tenderness.     Lymph Nodes:  No lymphadenopathy.    Neurological:   A&Ox4, No focal deficits.

## 2023-11-15 NOTE — ED ADULT TRIAGE NOTE - CHIEF COMPLAINT QUOTE
BIBA via [facility] from podiatrist office for right food diabetic wound, pt is also a dialysis patient received treatment yesterday.

## 2023-11-15 NOTE — ED PROVIDER NOTE - ATTENDING APP SHARED VISIT CONTRIBUTION OF CARE
I have personally performed a history and physical exam on this patient and personally directed the management of the patient. Patient presents for evaluation of right third toe ulceration sent in by his podiatrist no fevers no chills no vomiting denies any trauma he has had a history of diabetes patient has a history of DVTs on Eliquis and end-stage renal disease Tuesday Thursday Saturday    On physical exam patient is normocephalic atraumatic pupils equal round reactive light accommodation extraocular muscles intact patient has right third toe evidence of infection with redness however pedal pulses 2+ and capillary refills normal no edema noted neurovascularly intact at this point    Assessment plan we initiated IV antibiotics we obtain labs consulted podiatry in the emergency department advised to admit for further evaluation further management at this time patient updated and agrees with plan of care

## 2023-11-15 NOTE — ED ADULT NURSE NOTE - CHIEF COMPLAINT QUOTE
BIBA via [facility] from podiatrist office for right food diabetic wound, pt is also a dialysis patient received treatment yesterday.

## 2023-11-15 NOTE — H&P ADULT - NSHPOUTPATIENTPROVIDERS_GEN_ALL_CORE
PCP:  Dr. Philip Martinez ([place])  Podiatrist:  Dr. Dudley  Renal:  Reports so specific MD but he followed by Dialysis center in [place],

## 2023-11-15 NOTE — ED PROVIDER NOTE - CLINICAL SUMMARY MEDICAL DECISION MAKING FREE TEXT BOX
we initiated IV antibiotics we obtain labs consulted podiatry in the emergency department advised to admit for further evaluation further management at this time patient updated and agrees with plan of care

## 2023-11-15 NOTE — ED ADULT NURSE NOTE - NSFALLRISKINTERV_ED_ALL_ED
Assistance OOB with selected safe patient handling equipment if applicable/Assistance with ambulation/Communicate fall risk and risk factors to all staff, patient, and family/Monitor gait and stability/Provide visual cue: yellow wristband, yellow gown, etc/Reinforce activity limits and safety measures with patient and family/Call bell, personal items and telephone in reach/Instruct patient to call for assistance before getting out of bed/chair/stretcher/Non-slip footwear applied when patient is off stretcher/[place] to call system/Physically safe environment - no spills, clutter or unnecessary equipment/Purposeful Proactive Rounding/Room/bathroom lighting operational, light cord in reach

## 2023-11-15 NOTE — H&P ADULT - HISTORY OF PRESENT ILLNESS
Patient is a 58 year old male with past medical history of HTN, Type 2 diabetes (now treated with Insulin), Bilateral lower extremity DVT (on Eliquis),  ESRD (HD on Tues/Thurs/Sat), CAD, morbid obesity, PVD, who now presents in the ER sent in by his podiatrist Dr. Dudley due to gangrenous Right 3rd and 4th toes.    Patient states he started having an ulceration and drainage from his right 3rd and 4th toes about 3 weeks ago.  Patient denies complaint of foot or toe pain, fever, chills, tremors, N/V/D, CP or SOB.  Reports his podiatrist has been monitoring them and doing Betadine dressing changes.  Patient reports then toe have been worsening and then turned black and started to smell.  When he saw his podiatrist today he sent him with a list for Ivabx with Meropenum, Arterial duplex doppler, Cardiovascular consult by Dr. Lemus,  Infectious disease consult and Podiatry consult for probable amputation of Right 3rd and 4th toes tomorrow if medically cleared.     Patient is a 58 year old male with past medical history of Type 2 diabetes (now treated with Insulin), Bilateral lower extremity DVT (on Eliquis),  ESRD (HD on Tues/Thurs/Sat), CAD, morbid obesity, PVD, who now presents in the ER sent in by his podiatrist Dr. Dudley due to gangrenous Right 3rd and 4th toes.    Patient states he started having an ulceration and drainage from his right 3rd and 4th toes about 3 weeks ago.  Patient denies complaint of foot or toe pain, fever, chills, tremors, N/V/D, CP or SOB.  Reports his podiatrist has been monitoring them and doing Betadine dressing changes.  Patient reports then toe have been worsening and then turned black and started to smell.  When he saw his podiatrist today he sent him with a list for Ivabx with Meropenum, Arterial duplex doppler, Cardiovascular consult by Dr. Lemus,  Infectious disease consult and Podiatry consult for probable amputation of Right 3rd and 4th toes tomorrow if medically cleared.

## 2023-11-15 NOTE — H&P ADULT - NSHPSOCIALHISTORY_GEN_ALL_CORE
Tobacco use:  Denies current use.  Former smoker.  Alcohol use:  Denies use.  Drug use:  Denies use.

## 2023-11-15 NOTE — H&P ADULT - NSHPLABSRESULTS_GEN_ALL_CORE
.                         11.0   9.60  )-----------( 187      ( 15 Nov 2023 15:10 )              35.2       11-15    140  |  101  |  54<H>  ----------------------------<  185<H>  5.0   |  20  |  8.7<HH>    Ca    9.1      15 Nov 2023 15:10    TPro  7.0  /  Alb  4.0  /  TBili  0.3  /  DBili  x   /  AST  7   /  ALT  9   /  AlkPhos  120<H>  11-15              Urinalysis Basic - ( 15 Nov 2023 15:10 )    Color: x / Appearance: x / SG: x / pH: x  Gluc: 185 mg/dL / Ketone: x  / Bili: x / Urobili: x   Blood: x / Protein: x / Nitrite: x   Leuk Esterase: x / RBC: x / WBC x   Sq Epi: x / Non Sq Epi: x / Bacteria: x      Lactate Trend  11-15 @ 15:10 Lactate:1.8         < from: Xray Foot AP + Lateral + Oblique, Right (11.15.23 @ 15:06) >      ACC: [id number] EXAM:  XR FOOT COMP MIN 3 VIEWS RT   ORDERED BY: SUNDAR CHARLES     PROCEDURE DATE:  11/15/2023          INTERPRETATION:  INDICATION: Plantar ulcer overlying the third through   fifth metatarsals.    TECHNIQUE: 3 radiographs of the right foot are submitted for review.    COMPARISON: None.    FINDINGS/  IMPRESSION:    Erosive changes at the third and fourth metatarsal heads, fifth   metatarsal neck and fifth distal phalangeal tuft suspicious for   underlying multifocal osteomyelitis. Consider MR to evaluate the full   extent of disease.    Hallux valgus deformity.    Plantar soft tissue irregularity compatible with known ulceration.    Moderate degenerative changes, vascular/soft tissue calcifications.    --- End of Report ---

## 2023-11-15 NOTE — H&P ADULT - NSICDXFAMHXNEG_GEN_ALL
atrial fibrillation/cancer/congestive heart failure/COPD/coronary disease/diabetes/emphysema/heart disease/hypertension/kidney disease

## 2023-11-16 LAB
A1C WITH ESTIMATED AVERAGE GLUCOSE RESULT: 7.9 % — HIGH (ref 4–5.6)
A1C WITH ESTIMATED AVERAGE GLUCOSE RESULT: 7.9 % — HIGH (ref 4–5.6)
ANION GAP SERPL CALC-SCNC: 20 MMOL/L — HIGH (ref 7–14)
ANION GAP SERPL CALC-SCNC: 20 MMOL/L — HIGH (ref 7–14)
APTT BLD: 31 SEC — SIGNIFICANT CHANGE UP (ref 27–39.2)
APTT BLD: 31 SEC — SIGNIFICANT CHANGE UP (ref 27–39.2)
B-OH-BUTYR SERPL-SCNC: <0.2 MMOL/L — SIGNIFICANT CHANGE UP
B-OH-BUTYR SERPL-SCNC: <0.2 MMOL/L — SIGNIFICANT CHANGE UP
BASOPHILS # BLD AUTO: 0.01 K/UL — SIGNIFICANT CHANGE UP (ref 0–0.2)
BASOPHILS # BLD AUTO: 0.01 K/UL — SIGNIFICANT CHANGE UP (ref 0–0.2)
BASOPHILS NFR BLD AUTO: 0.2 % — SIGNIFICANT CHANGE UP (ref 0–1)
BASOPHILS NFR BLD AUTO: 0.2 % — SIGNIFICANT CHANGE UP (ref 0–1)
BLD GP AB SCN SERPL QL: SIGNIFICANT CHANGE UP
BLD GP AB SCN SERPL QL: SIGNIFICANT CHANGE UP
BUN SERPL-MCNC: 67 MG/DL — CRITICAL HIGH (ref 10–20)
BUN SERPL-MCNC: 67 MG/DL — CRITICAL HIGH (ref 10–20)
CALCIUM SERPL-MCNC: 8.6 MG/DL — SIGNIFICANT CHANGE UP (ref 8.4–10.5)
CALCIUM SERPL-MCNC: 8.6 MG/DL — SIGNIFICANT CHANGE UP (ref 8.4–10.5)
CHLORIDE SERPL-SCNC: 97 MMOL/L — LOW (ref 98–110)
CHLORIDE SERPL-SCNC: 97 MMOL/L — LOW (ref 98–110)
CO2 SERPL-SCNC: 17 MMOL/L — SIGNIFICANT CHANGE UP (ref 17–32)
CO2 SERPL-SCNC: 17 MMOL/L — SIGNIFICANT CHANGE UP (ref 17–32)
CREAT SERPL-MCNC: 9.8 MG/DL — CRITICAL HIGH (ref 0.7–1.5)
CREAT SERPL-MCNC: 9.8 MG/DL — CRITICAL HIGH (ref 0.7–1.5)
CRP SERPL-MCNC: 70.7 MG/L — HIGH
CRP SERPL-MCNC: 70.7 MG/L — HIGH
EGFR: 6 ML/MIN/1.73M2 — LOW
EGFR: 6 ML/MIN/1.73M2 — LOW
EOSINOPHIL # BLD AUTO: 0 K/UL — SIGNIFICANT CHANGE UP (ref 0–0.7)
EOSINOPHIL # BLD AUTO: 0 K/UL — SIGNIFICANT CHANGE UP (ref 0–0.7)
EOSINOPHIL NFR BLD AUTO: 0 % — SIGNIFICANT CHANGE UP (ref 0–8)
EOSINOPHIL NFR BLD AUTO: 0 % — SIGNIFICANT CHANGE UP (ref 0–8)
ERYTHROCYTE [SEDIMENTATION RATE] IN BLOOD: 87 MM/HR — HIGH (ref 0–10)
ERYTHROCYTE [SEDIMENTATION RATE] IN BLOOD: 87 MM/HR — HIGH (ref 0–10)
ESTIMATED AVERAGE GLUCOSE: 180 MG/DL — HIGH (ref 68–114)
ESTIMATED AVERAGE GLUCOSE: 180 MG/DL — HIGH (ref 68–114)
GLUCOSE BLDC GLUCOMTR-MCNC: 206 MG/DL — HIGH (ref 70–99)
GLUCOSE BLDC GLUCOMTR-MCNC: 206 MG/DL — HIGH (ref 70–99)
GLUCOSE BLDC GLUCOMTR-MCNC: 312 MG/DL — HIGH (ref 70–99)
GLUCOSE BLDC GLUCOMTR-MCNC: 312 MG/DL — HIGH (ref 70–99)
GLUCOSE BLDC GLUCOMTR-MCNC: 343 MG/DL — HIGH (ref 70–99)
GLUCOSE BLDC GLUCOMTR-MCNC: 343 MG/DL — HIGH (ref 70–99)
GLUCOSE BLDC GLUCOMTR-MCNC: 375 MG/DL — HIGH (ref 70–99)
GLUCOSE BLDC GLUCOMTR-MCNC: 375 MG/DL — HIGH (ref 70–99)
GLUCOSE SERPL-MCNC: 417 MG/DL — HIGH (ref 70–99)
GLUCOSE SERPL-MCNC: 417 MG/DL — HIGH (ref 70–99)
HCT VFR BLD CALC: 35.6 % — LOW (ref 42–52)
HCT VFR BLD CALC: 35.6 % — LOW (ref 42–52)
HGB BLD-MCNC: 11.6 G/DL — LOW (ref 14–18)
HGB BLD-MCNC: 11.6 G/DL — LOW (ref 14–18)
IMM GRANULOCYTES NFR BLD AUTO: 0.8 % — HIGH (ref 0.1–0.3)
IMM GRANULOCYTES NFR BLD AUTO: 0.8 % — HIGH (ref 0.1–0.3)
INR BLD: 1.03 RATIO — SIGNIFICANT CHANGE UP (ref 0.65–1.3)
INR BLD: 1.03 RATIO — SIGNIFICANT CHANGE UP (ref 0.65–1.3)
LYMPHOCYTES # BLD AUTO: 0.65 K/UL — LOW (ref 1.2–3.4)
LYMPHOCYTES # BLD AUTO: 0.65 K/UL — LOW (ref 1.2–3.4)
LYMPHOCYTES # BLD AUTO: 10.6 % — LOW (ref 20.5–51.1)
LYMPHOCYTES # BLD AUTO: 10.6 % — LOW (ref 20.5–51.1)
MCHC RBC-ENTMCNC: 27.8 PG — SIGNIFICANT CHANGE UP (ref 27–31)
MCHC RBC-ENTMCNC: 27.8 PG — SIGNIFICANT CHANGE UP (ref 27–31)
MCHC RBC-ENTMCNC: 32.6 G/DL — SIGNIFICANT CHANGE UP (ref 32–37)
MCHC RBC-ENTMCNC: 32.6 G/DL — SIGNIFICANT CHANGE UP (ref 32–37)
MCV RBC AUTO: 85.2 FL — SIGNIFICANT CHANGE UP (ref 80–94)
MCV RBC AUTO: 85.2 FL — SIGNIFICANT CHANGE UP (ref 80–94)
MONOCYTES # BLD AUTO: 0.08 K/UL — LOW (ref 0.1–0.6)
MONOCYTES # BLD AUTO: 0.08 K/UL — LOW (ref 0.1–0.6)
MONOCYTES NFR BLD AUTO: 1.3 % — LOW (ref 1.7–9.3)
MONOCYTES NFR BLD AUTO: 1.3 % — LOW (ref 1.7–9.3)
NEUTROPHILS # BLD AUTO: 5.35 K/UL — SIGNIFICANT CHANGE UP (ref 1.4–6.5)
NEUTROPHILS # BLD AUTO: 5.35 K/UL — SIGNIFICANT CHANGE UP (ref 1.4–6.5)
NEUTROPHILS NFR BLD AUTO: 87.1 % — HIGH (ref 42.2–75.2)
NEUTROPHILS NFR BLD AUTO: 87.1 % — HIGH (ref 42.2–75.2)
NRBC # BLD: 0 /100 WBCS — SIGNIFICANT CHANGE UP (ref 0–0)
NRBC # BLD: 0 /100 WBCS — SIGNIFICANT CHANGE UP (ref 0–0)
PLATELET # BLD AUTO: 194 K/UL — SIGNIFICANT CHANGE UP (ref 130–400)
PLATELET # BLD AUTO: 194 K/UL — SIGNIFICANT CHANGE UP (ref 130–400)
PMV BLD: 10.8 FL — HIGH (ref 7.4–10.4)
PMV BLD: 10.8 FL — HIGH (ref 7.4–10.4)
POTASSIUM SERPL-MCNC: 5 MMOL/L — SIGNIFICANT CHANGE UP (ref 3.5–5)
POTASSIUM SERPL-MCNC: 5 MMOL/L — SIGNIFICANT CHANGE UP (ref 3.5–5)
POTASSIUM SERPL-SCNC: 5 MMOL/L — SIGNIFICANT CHANGE UP (ref 3.5–5)
POTASSIUM SERPL-SCNC: 5 MMOL/L — SIGNIFICANT CHANGE UP (ref 3.5–5)
PROTHROM AB SERPL-ACNC: 11.7 SEC — SIGNIFICANT CHANGE UP (ref 9.95–12.87)
PROTHROM AB SERPL-ACNC: 11.7 SEC — SIGNIFICANT CHANGE UP (ref 9.95–12.87)
RBC # BLD: 4.18 M/UL — LOW (ref 4.7–6.1)
RBC # BLD: 4.18 M/UL — LOW (ref 4.7–6.1)
RBC # FLD: 14.4 % — SIGNIFICANT CHANGE UP (ref 11.5–14.5)
RBC # FLD: 14.4 % — SIGNIFICANT CHANGE UP (ref 11.5–14.5)
SODIUM SERPL-SCNC: 134 MMOL/L — LOW (ref 135–146)
SODIUM SERPL-SCNC: 134 MMOL/L — LOW (ref 135–146)
WBC # BLD: 6.14 K/UL — SIGNIFICANT CHANGE UP (ref 4.8–10.8)
WBC # BLD: 6.14 K/UL — SIGNIFICANT CHANGE UP (ref 4.8–10.8)
WBC # FLD AUTO: 6.14 K/UL — SIGNIFICANT CHANGE UP (ref 4.8–10.8)
WBC # FLD AUTO: 6.14 K/UL — SIGNIFICANT CHANGE UP (ref 4.8–10.8)

## 2023-11-16 PROCEDURE — 99232 SBSQ HOSP IP/OBS MODERATE 35: CPT

## 2023-11-16 PROCEDURE — 99222 1ST HOSP IP/OBS MODERATE 55: CPT

## 2023-11-16 RX ORDER — PIPERACILLIN AND TAZOBACTAM 4; .5 G/20ML; G/20ML
3.38 INJECTION, POWDER, LYOPHILIZED, FOR SOLUTION INTRAVENOUS EVERY 12 HOURS
Refills: 0 | Status: COMPLETED | OUTPATIENT
Start: 2023-11-16 | End: 2023-11-23

## 2023-11-16 RX ORDER — DIPHENHYDRAMINE HCL 50 MG
25 CAPSULE ORAL ONCE
Refills: 0 | Status: COMPLETED | OUTPATIENT
Start: 2023-11-16 | End: 2023-11-16

## 2023-11-16 RX ORDER — INSULIN GLARGINE 100 [IU]/ML
12 INJECTION, SOLUTION SUBCUTANEOUS AT BEDTIME
Refills: 0 | Status: DISCONTINUED | OUTPATIENT
Start: 2023-11-16 | End: 2023-11-25

## 2023-11-16 RX ORDER — INSULIN LISPRO 100/ML
4 VIAL (ML) SUBCUTANEOUS
Refills: 0 | Status: DISCONTINUED | OUTPATIENT
Start: 2023-11-16 | End: 2023-11-25

## 2023-11-16 RX ORDER — APIXABAN 2.5 MG/1
5 TABLET, FILM COATED ORAL EVERY 12 HOURS
Refills: 0 | Status: DISCONTINUED | OUTPATIENT
Start: 2023-11-16 | End: 2023-11-17

## 2023-11-16 RX ADMIN — PIPERACILLIN AND TAZOBACTAM 25 GRAM(S): 4; .5 INJECTION, POWDER, LYOPHILIZED, FOR SOLUTION INTRAVENOUS at 05:46

## 2023-11-16 RX ADMIN — GABAPENTIN 300 MILLIGRAM(S): 400 CAPSULE ORAL at 17:20

## 2023-11-16 RX ADMIN — Medication 25 MILLIGRAM(S): at 20:22

## 2023-11-16 RX ADMIN — GABAPENTIN 300 MILLIGRAM(S): 400 CAPSULE ORAL at 05:46

## 2023-11-16 RX ADMIN — Medication 8: at 17:12

## 2023-11-16 RX ADMIN — Medication 4 UNIT(S): at 17:12

## 2023-11-16 RX ADMIN — Medication 8 UNIT(S): at 00:18

## 2023-11-16 RX ADMIN — PIPERACILLIN AND TAZOBACTAM 25 GRAM(S): 4; .5 INJECTION, POWDER, LYOPHILIZED, FOR SOLUTION INTRAVENOUS at 17:12

## 2023-11-16 RX ADMIN — CHLORHEXIDINE GLUCONATE 1 APPLICATION(S): 213 SOLUTION TOPICAL at 05:46

## 2023-11-16 RX ADMIN — APIXABAN 5 MILLIGRAM(S): 2.5 TABLET, FILM COATED ORAL at 12:16

## 2023-11-16 RX ADMIN — Medication 10: at 09:24

## 2023-11-16 RX ADMIN — Medication 8: at 12:17

## 2023-11-16 RX ADMIN — INSULIN GLARGINE 12 UNIT(S): 100 INJECTION, SOLUTION SUBCUTANEOUS at 21:48

## 2023-11-16 RX ADMIN — Medication 1 TABLET(S): at 12:16

## 2023-11-16 RX ADMIN — PREGABALIN 1000 MICROGRAM(S): 225 CAPSULE ORAL at 12:16

## 2023-11-16 NOTE — CONSULT NOTE ADULT - SUBJECTIVE AND OBJECTIVE BOX
Podiatry Consult Note    Subjective:  ALEXANDRO MONROE  Seen Bedside 58y Male  .   Patient is a 58y old  Male who presents with a chief complaint of Gangrenous Right 3rd toe (16 Nov 2023 17:27)    HPI:  Patient is a 58 year old male with past medical history of Type 2 diabetes (now treated with Insulin), Bilateral lower extremity DVT (on Eliquis),  ESRD (HD on Tues/Thurs/Sat), CAD, morbid obesity, PVD, who now presents in the ER sent in by his podiatrist Dr. Dudley due to gangrenous Right 3rd and 4th toes.    Patient states he started having an ulceration and drainage from his right 3rd and 4th toes about 3 weeks ago.  Patient denies complaint of foot or toe pain, fever, chills, tremors, N/V/D, CP or SOB.  Reports his podiatrist has been monitoring them and doing Betadine dressing changes.  Patient reports then toe have been worsening and then turned black and started to smell.  When he saw his podiatrist today he sent him with a list for Ivabx with Meropenum, Arterial duplex doppler, Cardiovascular consult by Dr. Lemus,  Infectious disease consult and Podiatry consult for probable amputation of Right 3rd and 4th toes tomorrow if medically cleared.     (15 Nov 2023 18:38)      Past Medical History and Surgical History  PAST MEDICAL & SURGICAL HISTORY:  CAD (coronary artery disease)      DVT (deep venous thrombosis)      ESRD on dialysis      DM (diabetes mellitus)      Morbid obesity      Peripheral vascular disease      H/O varicose vein stripping      S/P dialysis catheter insertion           Review of Systems:  [X] Ten point review of systems is otherwise negative except as noted     Objective:  Vital Signs Last 24 Hrs  T(C): 36.2 (16 Nov 2023 21:08), Max: 36.2 (16 Nov 2023 04:39)  T(F): 97.2 (16 Nov 2023 21:08), Max: 97.2 (16 Nov 2023 21:08)  HR: 91 (16 Nov 2023 21:08) (86 - 105)  BP: 186/81 (16 Nov 2023 21:08) (170/87 - 193/97)  BP(mean): --  RR: 18 (16 Nov 2023 21:08) (18 - 18)  SpO2: --                            11.6   6.14  )-----------( 194      ( 16 Nov 2023 04:30 )             35.6                 11-16    134<L>  |  97<L>  |  67<HH>  ----------------------------<  417<H>  5.0   |  17  |  9.8<HH>    Ca    8.6      16 Nov 2023 04:30    TPro  7.0  /  Alb  4.0  /  TBili  0.3  /  DBili  x   /  AST  7   /  ALT  9   /  AlkPhos  120<H>  11-15        Physical Exam - Lower Extremity Focused:   Derm:   Right foot dye eschar to the  3rd, and 4th toes, no purulent drainage, mild malodor, no erythema.   Vascular: DP and PT Pulses Diminished; Foot is Warm to Warm to the touch; Capillary Refill Time < 3 Seconds;    Neuro: Protective Sensation Diminished / Moderately Neuropathic   MSK: Pain On Palpation at Wound Site     Assessment:  Right dry gangrene.   Plan:  Chart reviewed and Patient evaluated. All Questions and Concerns Addressed and Answered  Wound Culture Obtained; Sent to Microbiology; Pending Results   Local Wound Care; Wound Flushed w/ NS; Wound Packed w/ Betadine Soaked Gauze / DSD / Kerlix   Weight Bearing Status; WBAT w/ Heel Touch w/ Surgical Shoe;   plan for podiatric surgical intervention post Vascular intervention.   Discussed Plan w/ Dr. Gallegos.     Podiatry

## 2023-11-16 NOTE — CONSULT NOTE ADULT - SUBJECTIVE AND OBJECTIVE BOX
Patient is a 59 yo male w/ PMHx of b/l LE DVT (On Eliquis), PVD, ESRD on HD, IDDM, was sent in by his podiatrist Dr. Dudley due to gangrenous Right 3rd and 4th toes. Patient reports he was walking 3 weeks ago accidentally got a laceration on his third right toe, since than has been noticing malodorous discharge with skin color changes, and darkening of the toe. Reports saw his podiatrist who recommended coming to the ED.  Patient is a 57 yo male w/ PMHx of b/l LE DVT (On Eliquis), PVD, ESRD on HD, IDDM, was sent in by his podiatrist Dr. Dudley due to gangrenous Right 3rd and 4th toes. Patient reports he was walking 3 weeks ago accidentally got an abrasion on his third right toe from his 2nd R toenail, since than has been noticing malodorous discharge with skin color changes, and darkening of the toe. Reports saw his podiatrist who recommended coming to the ED. Patient now admitted for Infected Gangrenous Right 3rd and 4th toes.    PAST MEDICAL & SURGICAL HISTORY:  CAD (coronary artery disease)  DVT (deep venous thrombosis)  ESRD on dialysis  DM (diabetes mellitus)  Morbid obesity  Peripheral vascular disease  H/O varicose vein stripping  S/P dialysis catheter insertion    MEDICATIONS  (STANDING):  apixaban 5 milliGRAM(s) Oral every 12 hours  chlorhexidine 2% Cloths 1 Application(s) Topical <User Schedule>  cyanocobalamin 1000 MICROGram(s) Oral daily  dextrose 5%. 1000 milliLiter(s) (50 mL/Hr) IV Continuous <Continuous>  dextrose 5%. 1000 milliLiter(s) (100 mL/Hr) IV Continuous <Continuous>  dextrose 50% Injectable 25 Gram(s) IV Push once  dextrose 50% Injectable 12.5 Gram(s) IV Push once  dextrose 50% Injectable 25 Gram(s) IV Push once  gabapentin 300 milliGRAM(s) Oral two times a day  glucagon  Injectable 1 milliGRAM(s) IntraMuscular once  insulin glargine Injectable (LANTUS) 12 Unit(s) SubCutaneous at bedtime  insulin lispro (ADMELOG) corrective regimen sliding scale   SubCutaneous three times a day before meals  insulin lispro (ADMELOG) corrective regimen sliding scale   SubCutaneous at bedtime  insulin lispro Injectable (ADMELOG) 4 Unit(s) SubCutaneous three times a day before meals  midodrine. 10 milliGRAM(s) Oral three times a day  Nephro-danette 1 Tablet(s) Oral daily  piperacillin/tazobactam IVPB.. 3.375 Gram(s) IV Intermittent every 12 hours    MEDICATIONS  (PRN):  acetaminophen     Tablet .. 650 milliGRAM(s) Oral every 6 hours PRN Temp greater or equal to 38C (100.4F), Mild Pain (1 - 3)  dextrose Oral Gel 15 Gram(s) Oral once PRN Blood Glucose LESS THAN 70 milliGRAM(s)/deciliter  melatonin 3 milliGRAM(s) Oral at bedtime PRN Insomnia  ondansetron Injectable 4 milliGRAM(s) IV Push every 8 hours PRN Nausea and/or Vomiting      Allergies  Azactam (Hives; Rash)  Intolerances    Physical Exam:  General Appearance: NAD  Extremeties/Vascular: RLE: 3rd and 4th gangrenous toe with malodorous serous drainage and severe necrotic changes. Very faint pulse on R Dorsalis pedis, 1+ left dorsalis pedis pulse. No open ulcers, no calf tenderness, no swelling.       Vital Signs Last 24 Hrs  T(C): 36.2 (16 Nov 2023 04:39), Max: 36.2 (16 Nov 2023 04:39)  T(F): 97.1 (16 Nov 2023 04:39), Max: 97.1 (16 Nov 2023 04:39)  HR: 89 (16 Nov 2023 17:28) (86 - 105)  BP: 193/97 (16 Nov 2023 17:28) (170/87 - 193/97)  RR: 18 (16 Nov 2023 17:28) (18 - 18)  SpO2: 98% (15 Nov 2023 19:27) (98% - 98%)    Parameters below as of 15 Nov 2023 19:27  Patient On (Oxygen Delivery Method): room air    LABS:                        11.6   6.14  )-----------( 194      ( 16 Nov 2023 04:30 )             35.6     11-16    134<L>  |  97<L>  |  67<HH>  ----------------------------<  417<H>  5.0   |  17  |  9.8<HH>    Ca    8.6      16 Nov 2023 04:30    TPro  7.0  /  Alb  4.0  /  TBili  0.3  /  DBili  x   /  AST  7   /  ALT  9   /  AlkPhos  120<H>  11-15    PT/INR - ( 16 Nov 2023 04:30 )   PT: 11.70 sec;   INR: 1.03 ratio         PTT - ( 16 Nov 2023 04:30 )  PTT:31.0 sec  Urinalysis Basic - ( 16 Nov 2023 04:30 )    Color: x / Appearance: x / SG: x / pH: x  Gluc: 417 mg/dL / Ketone: x  / Bili: x / Urobili: x   Blood: x / Protein: x / Nitrite: x   Leuk Esterase: x / RBC: x / WBC x   Sq Epi: x / Non Sq Epi: x / Bacteria: x      CAPILLARY BLOOD GLUCOSE      POCT Blood Glucose.: 312 mg/dL (16 Nov 2023 16:24)  POCT Blood Glucose.: 343 mg/dL (16 Nov 2023 11:31)  POCT Blood Glucose.: 375 mg/dL (16 Nov 2023 09:02)  POCT Blood Glucose.: 413 mg/dL (15 Nov 2023 23:46)    LIVER FUNCTIONS - ( 15 Nov 2023 15:10 )  Alb: 4.0 g/dL / Pro: 7.0 g/dL / ALK PHOS: 120 U/L / ALT: 9 U/L / AST: 7 U/L / GGT: x             Cultures:      RADIOLOGY & ADDITIONAL STUDIES:  < from: VA Duplex Lower Extrem Arterial, Bilat (11.15.23 @ 20:35) >  mpression:  High-grade stenosis of the right common femoral artery. Severe stenosis   of the right popliteal artery.  Normal arterial flow in the left lower extremity.  ******PRELIMINARY REPORT******

## 2023-11-16 NOTE — CONSULT NOTE ADULT - SUBJECTIVE AND OBJECTIVE BOX
NEPHROLOGY CONSULTATION NOTE    ALEXANDRO MONROE  58y  Male  MRN-[id number]    CC:   Patient is a 58y old  Male who presents with a chief complaint of Gangrenous Right 3rd toe (16 Nov 2023 16:22)      HPI:  Patient is a 58 year old male with past medical history of Type 2 diabetes (now treated with Insulin), Bilateral lower extremity DVT (on Eliquis),  ESRD (HD on Tues/Thurs/Sat), CAD, morbid obesity, PVD, who now presents in the ER sent in by his podiatrist Dr. Dudley due to gangrenous Right 3rd and 4th toes.    Patient states he started having an ulceration and drainage from his right 3rd and 4th toes about 3 weeks ago.  Patient denies complaint of foot or toe pain, fever, chills, tremors, N/V/D, CP or SOB.  Reports his podiatrist has been monitoring them and doing Betadine dressing changes.  Patient reports then toe have been worsening and then turned black and started to smell.  When he saw his podiatrist today he sent him with a list for Ivabx with Meropenum, Arterial duplex doppler, Cardiovascular consult by Dr. Lemus,  Infectious disease consult and Podiatry consult for probable amputation of Right 3rd and 4th toes tomorrow if medically cleared.     (15 Nov 2023 18:38)      PAST MEDICAL & SURGICAL HISTORY:  CAD (coronary artery disease)      DVT (deep venous thrombosis)      ESRD on dialysis      DM (diabetes mellitus)      Morbid obesity      Peripheral vascular disease      H/O varicose vein stripping      S/P dialysis catheter insertion        Allergies:  Azactam (Hives; Rash)    Home Medications Reviewed  Hospital Medications:   MEDICATIONS  (STANDING):  apixaban 5 milliGRAM(s) Oral every 12 hours  chlorhexidine 2% Cloths 1 Application(s) Topical <User Schedule>  cyanocobalamin 1000 MICROGram(s) Oral daily  dextrose 5%. 1000 milliLiter(s) (50 mL/Hr) IV Continuous <Continuous>  dextrose 5%. 1000 milliLiter(s) (100 mL/Hr) IV Continuous <Continuous>  dextrose 50% Injectable 25 Gram(s) IV Push once  dextrose 50% Injectable 12.5 Gram(s) IV Push once  dextrose 50% Injectable 25 Gram(s) IV Push once  gabapentin 300 milliGRAM(s) Oral two times a day  glucagon  Injectable 1 milliGRAM(s) IntraMuscular once  insulin glargine Injectable (LANTUS) 12 Unit(s) SubCutaneous at bedtime  insulin lispro (ADMELOG) corrective regimen sliding scale   SubCutaneous three times a day before meals  insulin lispro (ADMELOG) corrective regimen sliding scale   SubCutaneous at bedtime  insulin lispro Injectable (ADMELOG) 4 Unit(s) SubCutaneous three times a day before meals  midodrine. 10 milliGRAM(s) Oral three times a day  Nephro-danette 1 Tablet(s) Oral daily  piperacillin/tazobactam IVPB.. 3.375 Gram(s) IV Intermittent every 12 hours    MEDICATIONS  (PRN):  acetaminophen     Tablet .. 650 milliGRAM(s) Oral every 6 hours PRN Temp greater or equal to 38C (100.4F), Mild Pain (1 - 3)  dextrose Oral Gel 15 Gram(s) Oral once PRN Blood Glucose LESS THAN 70 milliGRAM(s)/deciliter  melatonin 3 milliGRAM(s) Oral at bedtime PRN Insomnia  ondansetron Injectable 4 milliGRAM(s) IV Push every 8 hours PRN Nausea and/or Vomiting    Home Medications:  Aranesp 40 mcg/0.4 mL injectable solution: 0.4 milliliter(s) injectable once a week thursday (05 Nov 2021 21:40)  cyanocobalamin 100 mcg oral tablet: 1 tab(s) orally once a day (05 Nov 2021 21:42)  Eliquis 5 mg oral tablet: 1 tab(s) orally 2 times a day (05 Nov 2021 21:41)  gabapentin 300 mg oral tablet: 1 cap(s) orally every 24 hours (16 Nov 2021 10:04)  midodrine 5 mg oral tablet: 2 tab(s) orally 3 times a day (15 Nov 2023 19:12)  Nephrocaps oral capsule: 1 cap(s) orally once a day (05 Nov 2021 21:41)  NovoLOG 100 units/mL injectable solution: 7 unit(s) injectable 3 times a day (15 Nov 2023 20:01)  Tresiba 100 units/mL subcutaneous solution: 3 unit(s) subcutaneous once a day (at bedtime) (15 Nov 2023 20:02)  Venofer 20 mg/mL intravenous solution: 5 milliliter(s) intravenous Tuesday, Thursday, Imtiaz with HD (05 Nov 2021 21:38)      SOCIAL HISTORY:  Social History:  Tobacco use:  Denies current use.  Former smoker.  Alcohol use:  Denies use.  Drug use:  Denies use. (15 Nov 2023 18:38)      FAMILY HISTORY:  No pertinent family history in first degree relatives        REVIEW OF SYSTEMS:  All other review of systems is negative unless indicated above.    VITALS:  T(F): 97.1 (11-16-23 @ 04:39), Max: 97.1 (11-16-23 @ 04:39)  HR: 86 (11-16-23 @ 13:02)  BP: 183/102 (11-16-23 @ 13:02)  RR: 18 (11-16-23 @ 04:39)  SpO2: 98% (11-15-23 @ 19:27)    Height (cm): 182.9 (11-15 @ 22:05)  Weight (kg): 111 (11-15 @ 22:05)  BMI (kg/m2): 33.2 (11-15 @ 22:05)  BSA (m2): 2.32 (11-15 @ 22:05)  I&O's Detail        I&O's Summary      PHYSICAL EXAM:  Gen: NAD  resp: b/l breath sounds  card: S1/S2  abd: soft  ext: R foot bandaged  vascular access: R CW TDC     LABS:  Daily Height in cm: 182.88 (15 Nov 2023 22:05)    Daily     Lactate, Blood: 1.8 mmol/L (11-15-23 @ 15:10)    11-16    134<L>  |  97<L>  |  67<HH>  ----------------------------<  417<H>  5.0   |  17  |  9.8<HH>    Ca    8.6      16 Nov 2023 04:30    TPro  7.0  /  Alb  4.0  /  TBili  0.3  /  DBili      /  AST  7   /  ALT  9   /  AlkPhos  120<H>  11-15                            11.6   6.14  )-----------( 194      ( 16 Nov 2023 04:30 )             35.6     Mean Cell Volume: 85.2 fL (11-16-23 @ 04:30)

## 2023-11-16 NOTE — CONSULT NOTE ADULT - SUBJECTIVE AND OBJECTIVE BOX
ALEXANDRO MONROE  58y, Male  Allergies    58y  Intolerances    Male    LOS  1d    HPI  HPI:  Patient is a 58 year old male with past medical history of Type 2 diabetes (now treated with Insulin), Bilateral lower extremity DVT (on Eliquis),  ESRD (HD on Tues/Thurs/Sat), CAD, morbid obesity, PVD, who now presents in the ER sent in by his podiatrist Dr. Dudely due to gangrenous Right 3rd and 4th toes.    Patient states he started having an ulceration and drainage from his right 3rd and 4th toes about 3 weeks ago.  Patient denies complaint of foot or toe pain, fever, chills, tremors, N/V/D, CP or SOB.  Reports his podiatrist has been monitoring them and doing Betadine dressing changes.  Patient reports then toe have been worsening and then turned black and started to smell.  When he saw his podiatrist today he sent him with a list for Ivabx with Meropenum, Arterial duplex doppler, Cardiovascular consult by Dr. Lemus,  Infectious disease consult and Podiatry consult for probable amputation of Right 3rd and 4th toes tomorrow if medically cleared.     (15 Nov 2023 18:38)      INFECTIOUS DISEASE HISTORY:  Hospital course-  ID consulted for     Prior hospital charts reviewed [Yes]  Primary team notes reviewed [Yes]  Other consultant notes reviewed [Yes]    REVIEW OF SYSTEMS:  CONSTITUTIONAL: No fever or chills  HEENT: No sore throat  RESPIRATORY: No cough, no shortness of breath  CARDIOVASCULAR: No chest pain or palpitations  GASTROINTESTINAL: No abdominal or epigastric pain  GENITOURINARY: No dysuria  NEUROLOGICAL: No headache/dizziness  MSK: No joint pain, erythema, or swelling; no back pain  SKIN: No itching, rashes  All other ROS negative except noted above    PAST MEDICAL & SURGICAL HISTORY:  CAD (coronary artery disease)      DVT (deep venous thrombosis)      ESRD on dialysis      DM (diabetes mellitus)      Morbid obesity      Peripheral vascular disease      H/O varicose vein stripping      S/P dialysis catheter insertion          SOCIAL HISTORY:  - Born in _____, migrated [place] in 19XX  - Currently working as / Retired  - Lives with _____; no pets  - No recent travel  - Denies tobacco use, alcohol use, illicit drug use  - Currently sexually active, has one male/female sexual partner    FAMILY HISTORY:  No pertinent family history in first degree relatives        Allergy: 1d    ANTIMICROBIALS:  piperacillin/tazobactam IVPB.. 3.375 every 12 hours      ANTIMICROBIALS (past 90 days):  MEDICATIONS  (STANDING):  aztreonam  IVPB   50 mL/Hr IV Intermittent (11-15-23 @ 17:07)    metroNIDAZOLE  IVPB   100 mL/Hr IV Intermittent (11-15-23 @ 16:10)    piperacillin/tazobactam IVPB.   200 mL/Hr IV Intermittent (11-15-23 @ 23:04)    piperacillin/tazobactam IVPB.-   25 mL/Hr IV Intermittent (11-16-23 @ 05:46)    vancomycin  IVPB.   250 mL/Hr IV Intermittent (11-15-23 @ 16:10)        OTHER MEDS:   MEDICATIONS  (STANDING):  acetaminophen     Tablet .. 650 every 6 hours PRN  apixaban 5 every 12 hours  dextrose 50% Injectable 25 once  dextrose 50% Injectable 12.5 once  dextrose 50% Injectable 25 once  dextrose Oral Gel 15 once PRN  gabapentin 300 two times a day  glucagon  Injectable 1 once  insulin glargine Injectable (LANTUS) 12 at bedtime  insulin lispro (ADMELOG) corrective regimen sliding scale  three times a day before meals  insulin lispro (ADMELOG) corrective regimen sliding scale  at bedtime  insulin lispro Injectable (ADMELOG) 4 three times a day before meals  melatonin 3 at bedtime PRN  midodrine. 10 three times a day  ondansetron Injectable 4 every 8 hours PRN      VITALS:  Vital Signs Last 24 Hrs  T(F): 97.1 (11-16-23 @ 04:39), Max: 97.1 (11-16-23 @ 04:39)    Vital Signs Last 24 Hrs  HR: 100 (11-16-23 @ 04:39) (88 - 105)  BP: 171/98 (11-16-23 @ 04:39) (128/58 - 180/73)  RR: 18 (11-16-23 @ 04:39)  SpO2: 98% (11-15-23 @ 19:27) (98% - 98%)  Wt(kg): --    EXAM:  GENERAL: NAD, lying in bed  HEAD: No head lesions  NECK: Supple, nontender to palpation; no JVD  CHEST/LUNG: Clear to auscultation bilaterally  HEART: S1 S2  ABDOMEN: Soft, nontender, nondistended; normoactive bowel sounds  EXTREMITIES: No clubbing, cyanosis, or petal edema  NERVOUS SYSTEM: Alert and oriented to person, time, place and situation, speech clear. No focal deficits   MSK: No joint erythema, swelling or pain  SKIN: No rashes or lesions, no superficial thrombophlebitis    Labs:                        11.6   6.14  )-----------( 194      ( 16 Nov 2023 04:30 )             35.6     11-16    134<L>  |  97<L>  |  67<HH>  ----------------------------<  417<H>  5.0   |  17  |  9.8<HH>    Ca    8.6      16 Nov 2023 04:30    TPro  7.0  /  Alb  4.0  /  TBili  0.3  /  DBili  x   /  AST  7   /  ALT  9   /  AlkPhos  120<H>  11-15      WBC Trend:  WBC Count: 6.14 (11-16-23 @ 04:30)  WBC Count: 9.60 (11-15-23 @ 15:10)      Auto Neutrophil #: 5.35 K/uL (11-16-23 @ 04:30)  Auto Neutrophil #: 6.57 K/uL (11-15-23 @ 15:10)      Creatine Trend:  Creatinine: 9.8 (11-16)  Creatinine: 8.7 (11-15)      Liver Biochemical Testing Trend:  Alanine Aminotransferase (ALT/SGPT): 9 (11-15)  Aspartate Aminotransferase (AST/SGOT): 7 (11-15-23 @ 15:10)  Bilirubin Total: 0.3 (11-15)      Trend LDH      Auto Eosinophil %: 0.0 % (11-16-23 @ 04:30)  Auto Eosinophil %: 6.5 % (11-15-23 @ 15:10)      Urinalysis Basic - ( 16 Nov 2023 04:30 )    Color: x / Appearance: x / SG: x / pH: x  Gluc: 417 mg/dL / Ketone: x  / Bili: x / Urobili: x   Blood: x / Protein: x / Nitrite: x   Leuk Esterase: x / RBC: x / WBC x   Sq Epi: x / Non Sq Epi: x / Bacteria: x        MICROBIOLOGY:    Male                                      C-Reactive Protein, Serum: 70.7 (11-16)              Lactate, Blood: 1.8 (11-15 @ 15:10)        INFLAMMATORY MARKERS  Sedimentation Rate, Erythrocyte: 87 mm/Hr (11-16-23 @ 04:30)      RADIOLOGY & ADDITIONAL TESTS:  I have personally reviewed the imagings.  CXR      CT      CARDIOLOGY TESTING             [facility]  58y, Male  Allergies    Azactam (Hives; Rash)    Intolerances        LOS  1d    HPI  HPI:  Patient is a 58 year old male with past medical history of Type 2 diabetes (now treated with Insulin), Bilateral lower extremity DVT (on Eliquis),  ESRD (HD on Tues/Thurs/Sat), CAD, morbid obesity, PVD, who now presents in the ER sent in by his podiatrist Dr. Dudley due to gangrenous Right 3rd and 4th toes.    Patient states he started having an ulceration and drainage from his right 3rd and 4th toes about 3 weeks ago.  Patient denies complaint of foot or toe pain, fever, chills, tremors, N/V/D, CP or SOB.  Reports his podiatrist has been monitoring them and doing Betadine dressing changes.  Patient reports then toe have been worsening and then turned black and started to smell.  When he saw his podiatrist today he sent him with a list for Ivabx with Meropenum, Arterial duplex doppler, Cardiovascular consult by Dr. Lemus,  Infectious disease consult and Podiatry consult for probable amputation of Right 3rd and 4th toes tomorrow if medically cleared.     (15 Nov 2023 18:38)      INFECTIOUS DISEASE HISTORY:  ID is consulted for antimicrobial recommendations.     Prior hospital charts reviewed [Yes]  Primary team notes reviewed [Yes]  Other consultant notes reviewed [Yes]    REVIEW OF SYSTEMS:  CONSTITUTIONAL: No fever or chills  HEENT: No sore throat  RESPIRATORY: No cough, no shortness of breath  CARDIOVASCULAR: No chest pain or palpitations  GASTROINTESTINAL: No abdominal or epigastric pain  GENITOURINARY: No dysuria  NEUROLOGICAL: No headache/dizziness  MSK: No joint pain, erythema, or swelling; no back pain  SKIN: No itching, rashes  All other ROS negative except noted above    PAST MEDICAL & SURGICAL HISTORY:  CAD (coronary artery disease)      DVT (deep venous thrombosis)      ESRD on dialysis      DM (diabetes mellitus)      Morbid obesity      Peripheral vascular disease      H/O varicose vein stripping      S/P dialysis catheter insertion      SOCIAL HISTORY:  - No recent travel  - Denies tobacco use, alcohol use, illicit drug use    FAMILY HISTORY:  No pertinent family history in first degree relatives      ANTIMICROBIALS:  piperacillin/tazobactam IVPB.. 3.375 every 12 hours      ANTIMICROBIALS (past 90 days):  MEDICATIONS  (STANDING):  aztreonam  IVPB   50 mL/Hr IV Intermittent (11-15-23 @ 17:07)    metroNIDAZOLE  IVPB   100 mL/Hr IV Intermittent (11-15-23 @ 16:10)    piperacillin/tazobactam IVPB.   200 mL/Hr IV Intermittent (11-15-23 @ 23:04)    piperacillin/tazobactam IVPB.-   25 mL/Hr IV Intermittent (11-16-23 @ 05:46)    vancomycin  IVPB.   250 mL/Hr IV Intermittent (11-15-23 @ 16:10)        OTHER MEDS:   MEDICATIONS  (STANDING):  acetaminophen     Tablet .. 650 every 6 hours PRN  apixaban 5 every 12 hours  dextrose 50% Injectable 25 once  dextrose 50% Injectable 12.5 once  dextrose 50% Injectable 25 once  dextrose Oral Gel 15 once PRN  gabapentin 300 two times a day  glucagon  Injectable 1 once  insulin glargine Injectable (LANTUS) 12 at bedtime  insulin lispro (ADMELOG) corrective regimen sliding scale  three times a day before meals  insulin lispro (ADMELOG) corrective regimen sliding scale  at bedtime  insulin lispro Injectable (ADMELOG) 4 three times a day before meals  melatonin 3 at bedtime PRN  midodrine. 10 three times a day  ondansetron Injectable 4 every 8 hours PRN      VITALS:  Vital Signs Last 24 Hrs  T(F): 97.1 (11-16-23 @ 04:39), Max: 97.1 (11-16-23 @ 04:39)    Vital Signs Last 24 Hrs  HR: 100 (11-16-23 @ 04:39) (88 - 105)  BP: 171/98 (11-16-23 @ 04:39) (128/58 - 180/73)  RR: 18 (11-16-23 @ 04:39)  SpO2: 98% (11-15-23 @ 19:27) (98% - 98%)  Wt(kg): --    EXAM:  GENERAL: NAD, lying in bed  HEAD: No head lesions, Chest wall catheter C/D/I  NECK: Supple, nontender to palpation  CHEST/LUNG: Clear to auscultation bilaterally  HEART: S1 S2  ABDOMEN: Soft, nontender, nondistended  EXTREMITIES: Right lower extremity- Third and 4th toe severe necrotic changes, dry gangrene, No significant drainage noted. No heel ulcer noted. Left extremity with chronic stasis changes. No open ulcers.   NERVOUS SYSTEM: Alert and oriented to person, time, place and situation, speech clear. No focal deficits   MSK: No joint erythema, swelling or pain    Labs:                        11.6   6.14  )-----------( 194      ( 16 Nov 2023 04:30 )             35.6     11-16    134<L>  |  97<L>  |  67<HH>  ----------------------------<  417<H>  5.0   |  17  |  9.8<HH>    Ca    8.6      16 Nov 2023 04:30    TPro  7.0  /  Alb  4.0  /  TBili  0.3  /  DBili  x   /  AST  7   /  ALT  9   /  AlkPhos  120<H>  11-15      WBC Trend:  WBC Count: 6.14 (11-16-23 @ 04:30)  WBC Count: 9.60 (11-15-23 @ 15:10)      Auto Neutrophil #: 5.35 K/uL (11-16-23 @ 04:30)  Auto Neutrophil #: 6.57 K/uL (11-15-23 @ 15:10)      Creatine Trend:  Creatinine: 9.8 (11-16)  Creatinine: 8.7 (11-15)      Liver Biochemical Testing Trend:  Alanine Aminotransferase (ALT/SGPT): 9 (11-15)  Aspartate Aminotransferase (AST/SGOT): 7 (11-15-23 @ 15:10)  Bilirubin Total: 0.3 (11-15)      Trend LDH      Auto Eosinophil %: 0.0 % (11-16-23 @ 04:30)  Auto Eosinophil %: 6.5 % (11-15-23 @ 15:10)      Urinalysis Basic - ( 16 Nov 2023 04:30 )    Color: x / Appearance: x / SG: x / pH: x  Gluc: 417 mg/dL / Ketone: x  / Bili: x / Urobili: x   Blood: x / Protein: x / Nitrite: x   Leuk Esterase: x / RBC: x / WBC x   Sq Epi: x / Non Sq Epi: x / Bacteria: x        MICROBIOLOGY:      C-Reactive Protein, Serum: 70.7 (11-16)              Lactate, Blood: 1.8 (11-15 @ 15:10)        INFLAMMATORY MARKERS  Sedimentation Rate, Erythrocyte: 87 mm/Hr (11-16-23 @ 04:30)      RADIOLOGY & ADDITIONAL TESTS:  I have personally reviewed the imagings.  CXR      CT  < from: Xray Foot AP + Lateral + Oblique, Right (11.15.23 @ 15:06) >  FINDINGS/  IMPRESSION:    Erosive changes at the third and fourth metatarsal heads, fifth   metatarsal neck and fifth distal phalangeal tuft suspicious for   underlying multifocal osteomyelitis. Consider MR to evaluate the full   extent of disease.    Hallux valgus deformity.    Plantar soft tissue irregularity compatible with known ulceration.    Moderate degenerative changes, vascular/soft tissue calcifications.    < end of copied text >  < from: Xray Chest 1 View- PORTABLE-Routine (Xray Chest 1 View- PORTABLE-Routine .) (11.08.21 @ 12:22) >  Impression:    Low lungvolumes leads to magnification of the pulmonary interstitium.    Support devices as described.    < end of copied text >      CARDIOLOGY TESTING

## 2023-11-16 NOTE — CONSULT NOTE ADULT - SUBJECTIVE AND OBJECTIVE BOX
CARDIOLOGY CONSULT NOTE     CHIEF COMPLAINT/REASON FOR CONSULT:    HPI:  Patient is a 58 year old male with past medical history of Type 2 diabetes (now treated with Insulin), Bilateral lower extremity DVT (on Eliquis),  ESRD (HD on Tues/Thurs/Sat), CAD, morbid obesity, PVD, who now presents in the ER sent in by his podiatrist Dr. Dudley due to gangrenous Right 3rd and 4th toes.    Patient states he started having an ulceration and drainage from his right 3rd and 4th toes about 3 weeks ago.  Patient denies complaint of foot or toe pain, fever, chills, tremors, N/V/D, CP or SOB.  Reports his podiatrist has been monitoring them and doing Betadine dressing changes.  Patient reports then toe have been worsening and then turned black and started to smell.  When he saw his podiatrist today he sent him with a list for Ivabx with Meropenum, Arterial duplex doppler, Cardiovascular consult by Dr. Lemus,  Infectious disease consult and Podiatry consult for probable amputation of Right 3rd and 4th toes tomorrow if medically cleared.     (15 Nov 2023 18:38)      PAST MEDICAL & SURGICAL HISTORY:  CAD (coronary artery disease)      DVT (deep venous thrombosis)      ESRD on dialysis      DM (diabetes mellitus)      Morbid obesity      Peripheral vascular disease      H/O varicose vein stripping      S/P dialysis catheter insertion          Cardiac Risks:   [ ]HTN, [x ] DM, [ ] Smoking, [ ] FH,  [ ] Lipids        MEDICATIONS:  MEDICATIONS  (STANDING):  chlorhexidine 2% Cloths 1 Application(s) Topical <User Schedule>  cyanocobalamin 1000 MICROGram(s) Oral daily  dextrose 5%. 1000 milliLiter(s) (50 mL/Hr) IV Continuous <Continuous>  dextrose 5%. 1000 milliLiter(s) (100 mL/Hr) IV Continuous <Continuous>  dextrose 50% Injectable 25 Gram(s) IV Push once  dextrose 50% Injectable 12.5 Gram(s) IV Push once  dextrose 50% Injectable 25 Gram(s) IV Push once  gabapentin 300 milliGRAM(s) Oral two times a day  glucagon  Injectable 1 milliGRAM(s) IntraMuscular once  insulin lispro (ADMELOG) corrective regimen sliding scale   SubCutaneous three times a day before meals  insulin lispro (ADMELOG) corrective regimen sliding scale   SubCutaneous at bedtime  midodrine. 10 milliGRAM(s) Oral three times a day  Nephro-danette 1 Tablet(s) Oral daily  piperacillin/tazobactam IVPB.. 3.375 Gram(s) IV Intermittent every 12 hours      FAMILY HISTORY:  No pertinent family history in first degree relatives        SOCIAL HISTORY:        Allergies    Azactam (Hives; Rash)        	    REVIEW OF SYSTEMS:  CONSTITUTIONAL: No fever, weight loss, or fatigue  EYES: No eye pain, visual disturbances, or discharge  ENMT:  No difficulty hearing, tinnitus, vertigo; No sinus or throat pain  NECK: No pain or stiffness  RESPIRATORY: No cough, wheezing, chills or hemoptysis; No Shortness of Breath  CARDIOVASCULAR: No chest pain, palpitations, passing out, dizziness, or leg swelling  GASTROINTESTINAL: No abdominal or epigastric pain. No nausea, vomiting, or hematemesis; No diarrhea or constipation. No melena or hematochezia.  GENITOURINARY: No dysuria, frequency, hematuria, or incontinence  NEUROLOGICAL: No headaches, memory loss, loss of strength, numbness, or tremors  SKIN: No itching, burning, rashes, or lesions   	        PHYSICAL EXAM:  T(C): 36.2 (11-16-23 @ 04:39), Max: 36.2 (11-16-23 @ 04:39)  HR: 100 (11-16-23 @ 04:39) (88 - 105)  BP: 171/98 (11-16-23 @ 04:39) (128/58 - 180/73)  RR: 18 (11-16-23 @ 04:39) (18 - 18)  SpO2: 98% (11-15-23 @ 19:27) (98% - 98%)  Wt(kg): --  I&O's Summary      Appearance: Normal	  Psychiatry: A & O x 3, Mood & affect appropriate  HEENT:   Normal oral mucosa, PERRL, EOMI	  Lymphatic: No lymphadenopathy  Cardiovascular: Normal S1 S2,RRR, No JVD, No murmurs  Respiratory: Lungs clear to auscultation	  Gastrointestinal:  Soft, Non-tender, + BS	  Skin: No rashes, No ecchymoses, No cyanosis	  Neurologic: Non-focal  Extremities: Normal range of motion, No clubbing, cyanosis or edema  Vascular: Peripheral pulses palpable 2+ bilaterally      ECG:  	  < from: 12 Lead ECG (11.08.21 @ 11:08) >  Diagnosis Line Normal sinus rhythm  Cannot rule out Anterior infarct , age undetermined  Abnormal ECG    Confirmed by Ajay Juarez ([id number]) on 11/9/2021 8:35:12 PM    < end of copied text >    	  LABS:	 	    CARDIAC MARKERS:                                    11.6   6.14  )-----------( 194      ( 16 Nov 2023 04:30 )             35.6     11-15    140  |  101  |  54<H>  ----------------------------<  185<H>  5.0   |  20  |  8.7<HH>    Ca    9.1      15 Nov 2023 15:10    TPro  7.0  /  Alb  4.0  /  TBili  0.3  /  DBili  x   /  AST  7   /  ALT  9   /  AlkPhos  120<H>  11-15

## 2023-11-16 NOTE — PROGRESS NOTE ADULT - SUBJECTIVE AND OBJECTIVE BOX
58 year old male with past medical history of Type 2 diabetes (now treated with Insulin), Bilateral lower extremity DVT (on Eliquis),  ESRD (HD on Tues/Thurs/Sat), CAD, morbid obesity, PVD, who now presents in the ER sent in by his podiatrist Dr. Dudley due to gangrenous Right 3rd and 4th toes.    Patient now with Infected Gangrenous Right 3rd and 4th toes.    Today:  Seen at bedside, no new complaints.        REVIEW OF SYSTEMS:  No new complaints      MEDICATIONS  (STANDING):  apixaban 5 milliGRAM(s) Oral every 12 hours  chlorhexidine 2% Cloths 1 Application(s) Topical <User Schedule>  cyanocobalamin 1000 MICROGram(s) Oral daily  dextrose 5%. 1000 milliLiter(s) (50 mL/Hr) IV Continuous <Continuous>  dextrose 5%. 1000 milliLiter(s) (100 mL/Hr) IV Continuous <Continuous>  dextrose 50% Injectable 25 Gram(s) IV Push once  dextrose 50% Injectable 12.5 Gram(s) IV Push once  dextrose 50% Injectable 25 Gram(s) IV Push once  gabapentin 300 milliGRAM(s) Oral two times a day  glucagon  Injectable 1 milliGRAM(s) IntraMuscular once  insulin glargine Injectable (LANTUS) 12 Unit(s) SubCutaneous at bedtime  insulin lispro (ADMELOG) corrective regimen sliding scale   SubCutaneous three times a day before meals  insulin lispro (ADMELOG) corrective regimen sliding scale   SubCutaneous at bedtime  insulin lispro Injectable (ADMELOG) 4 Unit(s) SubCutaneous three times a day before meals  midodrine. 10 milliGRAM(s) Oral three times a day  Nephro-danette 1 Tablet(s) Oral daily  piperacillin/tazobactam IVPB.. 3.375 Gram(s) IV Intermittent every 12 hours    MEDICATIONS  (PRN):  acetaminophen     Tablet .. 650 milliGRAM(s) Oral every 6 hours PRN Temp greater or equal to 38C (100.4F), Mild Pain (1 - 3)  dextrose Oral Gel 15 Gram(s) Oral once PRN Blood Glucose LESS THAN 70 milliGRAM(s)/deciliter  melatonin 3 milliGRAM(s) Oral at bedtime PRN Insomnia  ondansetron Injectable 4 milliGRAM(s) IV Push every 8 hours PRN Nausea and/or Vomiting      Allergies  Azactam (Hives; Rash)      FAMILY HISTORY:  No pertinent family history in first degree relatives        Vital Signs Last 24 Hrs  T(C): 36.2 (16 Nov 2023 04:39), Max: 36.2 (16 Nov 2023 04:39)  T(F): 97.1 (16 Nov 2023 04:39), Max: 97.1 (16 Nov 2023 04:39)  HR: 86 (16 Nov 2023 13:02) (86 - 105)  BP: 183/102 (16 Nov 2023 13:02) (170/87 - 183/102)  RR: 18 (16 Nov 2023 04:39) (18 - 18)  SpO2: 98% (15 Nov 2023 19:27) (98% - 98%)    Parameters below as of 15 Nov 2023 19:27  Patient On (Oxygen Delivery Method): room air        PHYSICAL EXAM:  General: NAD.  Skin:  + Right 3rd and 4th toe gangrenous toes, + serous drainage, malodorous.  No rash, no pressure ulcers.  Eyes:  PERRLA, EOM intact.  Neck:  No tenderness.  Back:  No spinal tenderness.  Respiratory:  CTA B/L, No wheezes, rales or rhonchi.  Cardiovascular:  S1 & S2, RRR, no murmurs, rubs or gallops.   Gastrointestinal:   Soft, No distention, Protuberant, Non-tender, No rebound, guarding or Peritoneal signs.  Extremities:  + Right 3rd and 4th toe gangrenous and malodorous, + serous drainage, malodorous.  + Venous stasis changes B/L legs.  Normal rom,   Vascular:  + Right 3rd and 4th toe gangrenous toes.  + Palpable pulse (Right dorsalis pedis faint to palpation and Left Dorsalis pedis 1+),   No calf tenderness.   Neurological:   A&Ox4, No focal deficits.          LABS:                        11.6   6.14  )-----------( 194      ( 16 Nov 2023 04:30 )             35.6     11-16    134<L>  |  97<L>  |  67<HH>  ----------------------------<  417<H>  5.0   |  17  |  9.8<HH>    Ca    8.6      16 Nov 2023 04:30    TPro  7.0  /  Alb  4.0  /  TBili  0.3  /  DBili  x   /  AST  7   /  ALT  9   /  AlkPhos  120<H>  11-15    PT/INR - ( 16 Nov 2023 04:30 )   PT: 11.70 sec;   INR: 1.03 ratio         PTT - ( 16 Nov 2023 04:30 )  PTT:31.0 sec  Urinalysis Basic - ( 16 Nov 2023 04:30 )    Color: x / Appearance: x / SG: x / pH: x  Gluc: 417 mg/dL / Ketone: x  / Bili: x / Urobili: x   Blood: x / Protein: x / Nitrite: x   Leuk Esterase: x / RBC: x / WBC x   Sq Epi: x / Non Sq Epi: x / Bacteria: x

## 2023-11-16 NOTE — CONSULT NOTE ADULT - ASSESSMENT
Patient is a 57 yo male w/ PMHx of b/l LE DVT (On Eliquis), PVD, ESRD on HD, IDDM, was sent in by his podiatrist Dr. Dudley for Infected Gangrenous Right 3rd and 4th toes with Osteomyelitis. On RLE exam- 3rd and 4th gangrenous toe with malodorous serous drainage and severe necrotic changes. Very faint pulse on R Dorsalis pedis, 1+ left dorsalis pedis pulse. Venous stasis changes on b/l LE. No open ulcers, no calf tenderness, no swelling. Prelim arterial duplex w/ High-grade stenosis of the right common femoral artery. Severe stenosis of the right popliteal artery. and normal arterial flow in the left lower extremity.    Above discussed w/ Vascular Fellow Alvin Valentino  Will review the imaging for further recommendation  c/w IV abx per ID  Podiatry Consult   [facility]  Will Follow

## 2023-11-16 NOTE — PROGRESS NOTE ADULT - ASSESSMENT
58 year old male with past medical history of Type 2 diabetes (now treated with Insulin), Bilateral lower extremity DVT (on Eliquis),  ESRD (HD on Tues/Thurs/Sat), CAD, morbid obesity, PVD, who now presents in the ER sent in by his podiatrist Dr. Dudley due to gangrenous Right 3rd and 4th toes.    Patient now with Infected Gangrenous Right 3rd and 4th toes.          Plan:  # Infected Gangrenous Right 3rd and 4th toes with Osteomyelitis.    - Per Podiatry recommendations:     1. Xray right foot. (Done in ER earlier).     2. Pre-op labs:  CBC, BMP,, PT/PTT, HgA1c, ESR, CRP, T&S.     3. Wound care with Betadine and DSD to right foot.     4. Wound culture from right foot.     5. Vascular consult with Dr. Lemus.(Per podiatry recommendation) pending     6. Infectious disease consult.     7. Renal consult for hemodialysis  (Tues/Thurs/Sat)     8. Podiatry consult with Dr. Dudley pending  -Patient is asymptomatic from a cardiopulmonary standpoint so he is medically optimized at this point; further risk stratification will be as per cardiology  -ID consult appreciated, continue Vanco and Zosyn for now      # Diabetes:  - Low carbohydrate / DASH diet.  -Lantus with lispro TID premeal  - Monitor fingersticks.   - Lispro sliding scale PRN.        # ESRD:  - Renal consult for Hemodialysis on Tues / Thurs / Saturdays.   - Continue Nephro caps.        # History of DVT Bilateral lower extremities / VTE prophylaxis:  - Patient on Eliquis 5 mg BID        # CAD:  - Stable.  - Patient reports his Plavix and Atorvastatin stopped.

## 2023-11-16 NOTE — CONSULT NOTE ADULT - ASSESSMENT
ESRD on HD TTS in [place]  R foot gangrene  HTN  Normocytic anemia    - HD in AM, 3hrs, 2K+ bath, 3L UF as tolerated  - vascular/podiatry eval  - abx per ID recs / dose for iHD   - BP elevated;  d/c midodrine  - hgb at goal  - renal diet, check phos level

## 2023-11-16 NOTE — CONSULT NOTE ADULT - ASSESSMENT
Patient is a 58 year old male with past medical history of Type 2 diabetes (now treated with Insulin), Bilateral lower extremity DVT (on Eliquis),  ESRD (HD on Tues/Thurs/Sat), CAD, morbid obesity, PVD, who now presents in the ER sent in by his podiatrist Dr. Dudley due to gangrenous Right 3rd and 4th toes.  Patient denies angina or chf symptoms. He claims before toe probable  . He could walk. Cardiac risk podiatric surgery is intermediate. Note he has probable ZEINAB.

## 2023-11-17 LAB
GLUCOSE BLDC GLUCOMTR-MCNC: 158 MG/DL — HIGH (ref 70–99)
GLUCOSE BLDC GLUCOMTR-MCNC: 158 MG/DL — HIGH (ref 70–99)
GLUCOSE BLDC GLUCOMTR-MCNC: 159 MG/DL — HIGH (ref 70–99)
GLUCOSE BLDC GLUCOMTR-MCNC: 159 MG/DL — HIGH (ref 70–99)
GLUCOSE BLDC GLUCOMTR-MCNC: 272 MG/DL — HIGH (ref 70–99)
GLUCOSE BLDC GLUCOMTR-MCNC: 272 MG/DL — HIGH (ref 70–99)

## 2023-11-17 PROCEDURE — 99233 SBSQ HOSP IP/OBS HIGH 50: CPT

## 2023-11-17 RX ORDER — INFLUENZA VIRUS VACCINE 15; 15; 15; 15 UG/.5ML; UG/.5ML; UG/.5ML; UG/.5ML
0.5 SUSPENSION INTRAMUSCULAR ONCE
Refills: 0 | Status: COMPLETED | OUTPATIENT
Start: 2023-11-17 | End: 2023-11-17

## 2023-11-17 RX ORDER — APIXABAN 2.5 MG/1
2.5 TABLET, FILM COATED ORAL EVERY 12 HOURS
Refills: 0 | Status: DISCONTINUED | OUTPATIENT
Start: 2023-11-17 | End: 2023-11-18

## 2023-11-17 RX ORDER — LACTOBACILLUS ACIDOPHILUS 100MM CELL
1 CAPSULE ORAL ONCE
Refills: 0 | Status: DISCONTINUED | OUTPATIENT
Start: 2023-11-17 | End: 2023-11-25

## 2023-11-17 RX ADMIN — MIDODRINE HYDROCHLORIDE 10 MILLIGRAM(S): 2.5 TABLET ORAL at 11:41

## 2023-11-17 RX ADMIN — PREGABALIN 1000 MICROGRAM(S): 225 CAPSULE ORAL at 11:41

## 2023-11-17 RX ADMIN — APIXABAN 2.5 MILLIGRAM(S): 2.5 TABLET, FILM COATED ORAL at 17:08

## 2023-11-17 RX ADMIN — Medication 1 TABLET(S): at 12:41

## 2023-11-17 RX ADMIN — Medication 650 MILLIGRAM(S): at 17:08

## 2023-11-17 RX ADMIN — GABAPENTIN 300 MILLIGRAM(S): 400 CAPSULE ORAL at 17:08

## 2023-11-17 RX ADMIN — Medication 4 UNIT(S): at 17:16

## 2023-11-17 RX ADMIN — Medication 6: at 11:42

## 2023-11-17 RX ADMIN — Medication 4 UNIT(S): at 11:42

## 2023-11-17 RX ADMIN — PIPERACILLIN AND TAZOBACTAM 25 GRAM(S): 4; .5 INJECTION, POWDER, LYOPHILIZED, FOR SOLUTION INTRAVENOUS at 18:29

## 2023-11-17 RX ADMIN — PIPERACILLIN AND TAZOBACTAM 25 GRAM(S): 4; .5 INJECTION, POWDER, LYOPHILIZED, FOR SOLUTION INTRAVENOUS at 05:44

## 2023-11-17 RX ADMIN — INSULIN GLARGINE 12 UNIT(S): 100 INJECTION, SOLUTION SUBCUTANEOUS at 22:20

## 2023-11-17 RX ADMIN — Medication 2: at 17:16

## 2023-11-17 NOTE — CONSULT NOTE ADULT - ASSESSMENT
Continue current care as per ID and Podiatry   Resume Eliquis but 2.5 mg bid. Last dose Saturday morning to be started on IV heparin Saturday mid nigh (when second dose of Eliquis is due)   Wound care  NPO after midnight Monday for LE angiography Tuesday 7 AM   Vascular surgery evaluation post LE Angio   Will keep at [facility] if bed is available   Will F/U

## 2023-11-17 NOTE — CONSULT NOTE ADULT - SUBJECTIVE AND OBJECTIVE BOX
Patient is a 58y old  Male who presents with a chief complaint of Gangrenous Right 3rd toe (16 Nov 2023 21:33)  The patient was transferred from [facility] for LE angio but he was given Eliquis at 12 PM Thursday   The risks and possible complications were fully discussed with the patient especially bleeding complications and decision was made to postpone his LE Angiography       HPI:  Patient is a 58 year old male with past medical history of Type 2 diabetes (now treated with Insulin), Bilateral lower extremity DVT (on Eliquis),  ESRD (HD on Tues/Thurs/Sat), CAD, morbid obesity, PVD, who now presents in the ER sent in by his podiatrist Dr. Dudley due to gangrenous Right 3rd and 4th toes.    Patient states he started having an ulceration and drainage from his right 3rd and 4th toes about 3 weeks ago.  Patient denies complaint of foot or toe pain, fever, chills, tremors, N/V/D, CP or SOB.  Reports his podiatrist has been monitoring them and doing Betadine dressing changes.  Patient reports then toe have been worsening and then turned black and started to smell.  When he saw his podiatrist today he sent him with a list for Ivabx with Meropenum, Arterial duplex doppler, Cardiovascular consult by Dr. Lemus,  Infectious disease consult and Podiatry consult for probable amputation of Right 3rd and 4th toes tomorrow if medically cleared.     (15 Nov 2023 18:38)      PAST MEDICAL & SURGICAL HISTORY:  CAD (coronary artery disease)      DVT (deep venous thrombosis)      ESRD on dialysis      DM (diabetes mellitus)      Morbid obesity      Peripheral vascular disease      H/O varicose vein stripping      S/P dialysis catheter insertion          PREVIOUS DIAGNOSTIC TESTING:      LE arterial duplex: Severe right CFA and popliteal stenosis       MEDICATIONS  (STANDING):  apixaban 5 milliGRAM(s) Oral every 12 hours  chlorhexidine 2% Cloths 1 Application(s) Topical <User Schedule>  cyanocobalamin 1000 MICROGram(s) Oral daily  dextrose 5%. 1000 milliLiter(s) (50 mL/Hr) IV Continuous <Continuous>  dextrose 5%. 1000 milliLiter(s) (100 mL/Hr) IV Continuous <Continuous>  dextrose 50% Injectable 25 Gram(s) IV Push once  dextrose 50% Injectable 12.5 Gram(s) IV Push once  dextrose 50% Injectable 25 Gram(s) IV Push once  gabapentin 300 milliGRAM(s) Oral two times a day  glucagon  Injectable 1 milliGRAM(s) IntraMuscular once  insulin glargine Injectable (LANTUS) 12 Unit(s) SubCutaneous at bedtime  insulin lispro (ADMELOG) corrective regimen sliding scale   SubCutaneous three times a day before meals  insulin lispro (ADMELOG) corrective regimen sliding scale   SubCutaneous at bedtime  insulin lispro Injectable (ADMELOG) 4 Unit(s) SubCutaneous three times a day before meals  midodrine. 10 milliGRAM(s) Oral three times a day  Nephro-danette 1 Tablet(s) Oral daily  piperacillin/tazobactam IVPB.. 3.375 Gram(s) IV Intermittent every 12 hours    MEDICATIONS  (PRN):  acetaminophen     Tablet .. 650 milliGRAM(s) Oral every 6 hours PRN Temp greater or equal to 38C (100.4F), Mild Pain (1 - 3)  dextrose Oral Gel 15 Gram(s) Oral once PRN Blood Glucose LESS THAN 70 milliGRAM(s)/deciliter  melatonin 3 milliGRAM(s) Oral at bedtime PRN Insomnia  ondansetron Injectable 4 milliGRAM(s) IV Push every 8 hours PRN Nausea and/or Vomiting      FAMILY HISTORY:  No pertinent family history in first degree relatives        SOCIAL HISTORY:  CIGARETTES: Ex-smoker     ALCOHOL: None     REVIEW OF SYSTEMS:  CONSTITUTIONAL: No fever, weight loss, or fatigue  NECK: No pain or stiffness  RESPIRATORY: No cough, wheezing, chills or hemoptysis; No shortness of breath  CARDIOVASCULAR: No chest pain, palpitations, dizziness, or leg swelling  GASTROINTESTINAL: No abdominal or epigastric pain. No nausea, vomiting, or hematemesis; No diarrhea or constipation. No melena or hematochezia.  GENITOURINARY: No dysuria, frequency, hematuria, or incontinence  NEUROLOGICAL: No headaches, memory loss, loss of strength, numbness, or tremors  SKIN: No itching, burning, rashes, or lesions   ENDOCRINE: No heat or cold intolerance; No hair loss  MUSCULOSKELETAL: No joint pain or swelling; No muscle, back, or extremity pain  HEME/LYMPH: No easy bruising, or bleeding gums          Vital Signs Last 24 Hrs  T(C): 36.2 (17 Nov 2023 05:00), Max: 36.2 (16 Nov 2023 21:08)  T(F): 97.2 (17 Nov 2023 05:00), Max: 97.2 (16 Nov 2023 21:08)  HR: 83 (17 Nov 2023 05:00) (83 - 91)  BP: 141/73 (17 Nov 2023 05:00) (141/73 - 193/97)  BP(mean): --  RR: 18 (17 Nov 2023 05:00) (18 - 18)  SpO2: 100% (17 Nov 2023 05:00) (100% - 100%)    Parameters below as of 17 Nov 2023 05:00  Patient On (Oxygen Delivery Method): room air            PHYSICAL EXAM:  GENERAL: NAD, well-groomed, well-developed  HEAD:  Atraumatic, Normocephalic  NECK: Supple, No JVD, Normal thyroid  NERVOUS SYSTEM:  Alert & Oriented X3, Good concentration  CHEST/LUNG: Clear to percussion bilaterally; No rales, rhonchi, wheezing, or rubs  HEART: Regular rate and rhythm; + SE murmur. No rubs, or gallops  ABDOMEN: Soft, Nontender, Nondistended; Bowel sounds present  EXTREMITIES:  + edema bilaterally. Right foot covered. Absent left AT/PT. ++ stasis dermatitis   SKIN: No rashes or lesions      I&O's Detail      LABS:                        11.6   6.14  )-----------( 194      ( 16 Nov 2023 04:30 )             35.6     11-16    134<L>  |  97<L>  |  67<HH>  ----------------------------<  417<H>  5.0   |  17  |  9.8<HH>    Ca    8.6      16 Nov 2023 04:30    TPro  7.0  /  Alb  4.0  /  TBili  0.3  /  DBili  x   /  AST  7   /  ALT  9   /  AlkPhos  120<H>  11-15        PT/INR - ( 16 Nov 2023 04:30 )   PT: 11.70 sec;   INR: 1.03 ratio         PTT - ( 16 Nov 2023 04:30 )  PTT:31.0 sec  Urinalysis Basic - ( 16 Nov 2023 04:30 )    Color: x / Appearance: x / SG: x / pH: x  Gluc: 417 mg/dL / Ketone: x  / Bili: x / Urobili: x   Blood: x / Protein: x / Nitrite: x   Leuk Esterase: x / RBC: x / WBC x   Sq Epi: x / Non Sq Epi: x / Bacteria: x      I&O's Summary      RADIOLOGY & ADDITIONAL STUDIES:        midodrine. 10 milliGRAM(s) Oral three times a day

## 2023-11-17 NOTE — CONSULT NOTE ADULT - REASON FOR ADMISSION
Gangrenous Right 3rd toe

## 2023-11-17 NOTE — PROGRESS NOTE ADULT - SUBJECTIVE AND OBJECTIVE BOX
Nephrology progress note    THIS IS AN INCOMPLETE NOTE . FULL NOTE TO FOLLOW SHORTLY    Patient is seen and examined, events over the last 24 h noted .    Allergies:  Azactam (Hives; Rash)    Hospital Medications:   MEDICATIONS  (STANDING):  apixaban 5 milliGRAM(s) Oral every 12 hours  chlorhexidine 2% Cloths 1 Application(s) Topical <User Schedule>  cyanocobalamin 1000 MICROGram(s) Oral daily  dextrose 5%. 1000 milliLiter(s) (50 mL/Hr) IV Continuous <Continuous>  dextrose 5%. 1000 milliLiter(s) (100 mL/Hr) IV Continuous <Continuous>  dextrose 50% Injectable 25 Gram(s) IV Push once  dextrose 50% Injectable 12.5 Gram(s) IV Push once  dextrose 50% Injectable 25 Gram(s) IV Push once  gabapentin 300 milliGRAM(s) Oral two times a day  glucagon  Injectable 1 milliGRAM(s) IntraMuscular once  insulin glargine Injectable (LANTUS) 12 Unit(s) SubCutaneous at bedtime  insulin lispro (ADMELOG) corrective regimen sliding scale   SubCutaneous three times a day before meals  insulin lispro (ADMELOG) corrective regimen sliding scale   SubCutaneous at bedtime  insulin lispro Injectable (ADMELOG) 4 Unit(s) SubCutaneous three times a day before meals  midodrine. 10 milliGRAM(s) Oral three times a day  Nephro-danette 1 Tablet(s) Oral daily  piperacillin/tazobactam IVPB.. 3.375 Gram(s) IV Intermittent every 12 hours        VITALS:  T(F): 98.5 (11-17-23 @ 08:29), Max: 98.5 (11-17-23 @ 08:29)  HR: 58 (11-17-23 @ 08:29)  BP: 177/89 (11-17-23 @ 08:29)  RR: 16 (11-17-23 @ 08:29)  SpO2: 100% (11-17-23 @ 08:29)  Wt(kg): --        PHYSICAL EXAM:  Constitutional: NAD  HEENT: anicteric sclera, oropharynx clear, MMM  Neck: No JVD  Respiratory: CTAB, no wheezes, rales or rhonchi  Cardiovascular: S1, S2, RRR  Gastrointestinal: BS+, soft, NT/ND  Extremities: No cyanosis or clubbing. No peripheral edema  :  No callahan.   Skin: No rashes    LABS:  11-16    134<L>  |  97<L>  |  67<HH>  ----------------------------<  417<H>  5.0   |  17  |  9.8<HH>    Ca    8.6      16 Nov 2023 04:30    TPro  7.0  /  Alb  4.0  /  TBili  0.3  /  DBili      /  AST  7   /  ALT  9   /  AlkPhos  120<H>  11-15                          11.6   6.14  )-----------( 194      ( 16 Nov 2023 04:30 )             35.6       Urine Studies:  Urinalysis Basic - ( 16 Nov 2023 04:30 )    Color:  / Appearance:  / SG:  / pH:   Gluc: 417 mg/dL / Ketone:   / Bili:  / Urobili:    Blood:  / Protein:  / Nitrite:    Leuk Esterase:  / RBC:  / WBC    Sq Epi:  / Non Sq Epi:  / Bacteria:             HBsAb Nonreact      [11-13-21 @ 09:30]  HBsAg Nonreact      [11-08-21 @ 15:44]  HCV 0.12, Nonreact      [11-08-21 @ 15:44]        RADIOLOGY & ADDITIONAL STUDIES:   Nephrology progress note    Patient is seen and examined, events over the last 24 h noted .  lying in bed comfortable   cardiac cath rescheduled     Allergies:  Azactam (Hives; Rash)    Hospital Medications:   MEDICATIONS  (STANDING):  apixaban 5 milliGRAM(s) Oral every 12 hours  cyanocobalamin 1000 MICROGram(s) Oral daily  gabapentin 300 milliGRAM(s) Oral two times a day  glucagon  Injectable 1 milliGRAM(s) IntraMuscular once  insulin glargine Injectable (LANTUS) 12 Unit(s) SubCutaneous at bedtime  insulin lispro (ADMELOG) corrective regimen sliding scale   SubCutaneous three times a day before meals  insulin lispro (ADMELOG) corrective regimen sliding scale   SubCutaneous at bedtime  insulin lispro Injectable (ADMELOG) 4 Unit(s) SubCutaneous three times a day before meals  midodrine. 10 milliGRAM(s) Oral three times a day  Nephro-danette 1 Tablet(s) Oral daily  piperacillin/tazobactam IVPB.. 3.375 Gram(s) IV Intermittent every 12 hours        VITALS:  T(F): 98.5 (11-17-23 @ 08:29), Max: 98.5 (11-17-23 @ 08:29)  HR: 58 (11-17-23 @ 08:29)  BP: 177/89 (11-17-23 @ 08:29)  RR: 16 (11-17-23 @ 08:29)  SpO2: 100% (11-17-23 @ 08:29)          PHYSICAL EXAM:  Constitutional: NAD  Respiratory: CTAB,   Cardiovascular: S1, S2, RRR  Gastrointestinal: BS+, soft, NT/ND  Extremities: No cyanosis or clubbing. No peripheral edema/ right foot in dressing  :  No callahan.   Skin: No rashes    LABS:  11-16    134<L>  |  97<L>  |  67<HH>  ----------------------------<  417<H>  5.0   |  17  |  9.8<HH>    Ca    8.6      16 Nov 2023 04:30    TPro  7.0  /  Alb  4.0  /  TBili  0.3  /  DBili      /  AST  7   /  ALT  9   /  AlkPhos  120<H>  11-15                          11.6   6.14  )-----------( 194      ( 16 Nov 2023 04:30 )             35.6       Urine Studies:  Urinalysis Basic - ( 16 Nov 2023 04:30 )    Color:  / Appearance:  / SG:  / pH:   Gluc: 417 mg/dL / Ketone:   / Bili:  / Urobili:    Blood:  / Protein:  / Nitrite:    Leuk Esterase:  / RBC:  / WBC    Sq Epi:  / Non Sq Epi:  / Bacteria:             HBsAb Nonreact      [11-13-21 @ 09:30]  HBsAg Nonreact      [11-08-21 @ 15:44]  HCV 0.12, Nonreact      [11-08-21 @ 15:44]        RADIOLOGY & ADDITIONAL STUDIES:

## 2023-11-17 NOTE — CONSULT NOTE ADULT - CONSULT REQUESTED DATE/TIME
16-Nov-2023 17:02
16-Nov-2023 17:28
16-Nov-2023 08:06
16-Nov-2023 11:54
16-Nov-2023 21:33
16-Nov-2023

## 2023-11-17 NOTE — PROGRESS NOTE ADULT - ASSESSMENT
ESRD on HD TTS in [place]  R foot gangrene  HTN  Normocytic anemia    - HD today  3hrs, 2K+ bath, 3L UF as tolerated  - cardiac cath monday? angio early next week   - abx per ID recs / dose for iHD / on zosyn   - BP elevated;  d/c midodrine  - hgb at goal  - renal diet, check phos level    will follow

## 2023-11-17 NOTE — CHART NOTE - NSCHARTNOTEFT_GEN_A_CORE
Called by NP at cath lab this AM- patient was transported [place] for angiogram this morning.  The hospital medicine team was not aware that angiogram was planned for today as this was not documented in chart or communicated to any member of hospital medicine team.  Patient was thus not made NPO at midnight, AC was not held.  NP at cath lab requesting patient stay at [place] as patient under hospitalist service.  Discussed case with admitting hospitalist at [facility] and my supervisor regarding transfer.

## 2023-11-17 NOTE — PROGRESS NOTE ADULT - ASSESSMENT
58 year old male with past medical history of Type 2 diabetes (now treated with Insulin), Bilateral lower extremity DVT (on Eliquis),  ESRD (HD on Tues/Thurs/Sat), CAD, morbid obesity, PVD, who now presents in the ER sent in by his podiatrist Dr. Dudley due to gangrenous Right 3rd and 4th toes.    Patient now with Infected Gangrenous Right 3rd and 4th toes.        Infected Gangrenous Right 3rd and 4th toes with Osteomyelitis  PVD  Type 2 diabetes   ESRD (HD)   CAD     Pt was transfered form [facility] today AM for PTA but py was on Eliquis and also not NPO. Surgery deferred  Now vascular plan to do procedure coming Tuesday  Pt refused to ba transfered back to [facility]   As per vascular recs dose of Eliquis will be reduced to 2.5 mg twice daily and starting Sat morning Eliquis will be held & started on heparin infusion  HD due for today   ID noted-continue Vanco and Zosyn for now  DM2--Lantus with lispro TID premeal- Lispro sliding scale.  History of DVT Bilateral lower extremities / VTE prophylaxis:- Patient on Eliquis( dose reduced to 2.5 mg as per vascular)  CAD- Patient reports his Plavix and Atorvastatin stopped.   Handoff: Plan for vascular intervention coming Tuesday

## 2023-11-17 NOTE — PROGRESS NOTE ADULT - SUBJECTIVE AND OBJECTIVE BOX
Progress Note:  Provider Speciality                            Hospitalist      ALEXANDRO MONROE [id number] 58y Male     CHIEF PRESENTING COMPLAINT:  Patient is a 58y old  Male who presents with a chief complaint of Gangrenous Right 3rd toe (17 Nov 2023 09:08)        SUBJECTIVE:  Patient was seen and examined at bedside. No new complaints described by patient in morning rounds.   No significant overnight events reported.     HISTORY OF PRESENTING ILLNESS:  HPI:  Patient is a 58 year old male with past medical history of Type 2 diabetes (now treated with Insulin), Bilateral lower extremity DVT (on Eliquis),  ESRD (HD on Tues/Thurs/Sat), CAD, morbid obesity, PVD, who now presents in the ER sent in by his podiatrist Dr. Dudley due to gangrenous Right 3rd and 4th toes.    Patient states he started having an ulceration and drainage from his right 3rd and 4th toes about 3 weeks ago.  Patient denies complaint of foot or toe pain, fever, chills, tremors, N/V/D, CP or SOB.  Reports his podiatrist has been monitoring them and doing Betadine dressing changes.  Patient reports then toe have been worsening and then turned black and started to smell.  When he saw his podiatrist today he sent him with a list for Ivabx with Meropenum, Arterial duplex doppler, Cardiovascular consult by Dr. Lemus,  Infectious disease consult and Podiatry consult for probable amputation of Right 3rd and 4th toes tomorrow if medically cleared.     (15 Nov 2023 18:38)        REVIEW OF SYSTEMS:  Patient denies  headache, fever, chills. Denies chest pain, shortness of breath, palpitation. Denies nausea, vomiting, abdominal pain or diarrhoea, Denies dysuria.   At least 10 systems were reviewed in ROS. All systems reviewed  are within normal limits except for the complaints as described in Subjective.    PAST MEDICAL & SURGICAL HISTORY:  PAST MEDICAL & SURGICAL HISTORY:  CAD (coronary artery disease)      DVT (deep venous thrombosis)      ESRD on dialysis      DM (diabetes mellitus)      Morbid obesity      Peripheral vascular disease      H/O varicose vein stripping      S/P dialysis catheter insertion              VITAL SIGNS:  Vital Signs Last 24 Hrs  T(C): 36.9 (17 Nov 2023 11:51), Max: 36.9 (17 Nov 2023 08:29)  T(F): 98.4 (17 Nov 2023 11:51), Max: 98.5 (17 Nov 2023 08:29)  HR: 62 (17 Nov 2023 11:51) (58 - 91)  BP: 156/75 (17 Nov 2023 11:51) (141/73 - 193/97)  BP(mean): --  RR: 16 (17 Nov 2023 11:51) (16 - 18)  SpO2: 100% (17 Nov 2023 08:29) (100% - 100%)    Parameters below as of 17 Nov 2023 08:29  Patient On (Oxygen Delivery Method): room air              PHYSICAL EXAMINATION:  Not in acute distress  General: No icterus  HEENT:   no JVD.  Heart: S1+S2 audible  Lungs: bilateral  moderate air entry, no wheezing, no crepitations.  Abdomen: Soft, non-tender, non-distended , no  rigidity or guarding.  CNS: Awake alert, CN  grossly intact.  Extremities:  No edema      + Right 3rd and 4th toe gangrenous toes. Right dorsalis pedis faint to palpation.         CONSULTS:  Consultant(s) Notes Reviewed by me.   Care Discussed with Consultants/Other Providers where required.    All the images and labs were reviewed today        MEDICATIONS:  MEDICATIONS  (STANDING):  apixaban 2.5 milliGRAM(s) Oral every 12 hours  chlorhexidine 2% Cloths 1 Application(s) Topical <User Schedule>  cyanocobalamin 1000 MICROGram(s) Oral daily  dextrose 5%. 1000 milliLiter(s) (50 mL/Hr) IV Continuous <Continuous>  dextrose 5%. 1000 milliLiter(s) (100 mL/Hr) IV Continuous <Continuous>  dextrose 50% Injectable 25 Gram(s) IV Push once  dextrose 50% Injectable 12.5 Gram(s) IV Push once  dextrose 50% Injectable 25 Gram(s) IV Push once  gabapentin 300 milliGRAM(s) Oral two times a day  glucagon  Injectable 1 milliGRAM(s) IntraMuscular once  insulin glargine Injectable (LANTUS) 12 Unit(s) SubCutaneous at bedtime  insulin lispro (ADMELOG) corrective regimen sliding scale   SubCutaneous three times a day before meals  insulin lispro (ADMELOG) corrective regimen sliding scale   SubCutaneous at bedtime  insulin lispro Injectable (ADMELOG) 4 Unit(s) SubCutaneous three times a day before meals  lactobacillus acidophilus 1 Tablet(s) Oral once  midodrine. 10 milliGRAM(s) Oral three times a day  Nephro-danette 1 Tablet(s) Oral daily  piperacillin/tazobactam IVPB.. 3.375 Gram(s) IV Intermittent every 12 hours    MEDICATIONS  (PRN):  acetaminophen     Tablet .. 650 milliGRAM(s) Oral every 6 hours PRN Temp greater or equal to 38C (100.4F), Mild Pain (1 - 3)  dextrose Oral Gel 15 Gram(s) Oral once PRN Blood Glucose LESS THAN 70 milliGRAM(s)/deciliter  melatonin 3 milliGRAM(s) Oral at bedtime PRN Insomnia  ondansetron Injectable 4 milliGRAM(s) IV Push every 8 hours PRN Nausea and/or Vomiting

## 2023-11-18 LAB
ALBUMIN SERPL ELPH-MCNC: 3.7 G/DL — SIGNIFICANT CHANGE UP (ref 3.5–5.2)
ALBUMIN SERPL ELPH-MCNC: 3.7 G/DL — SIGNIFICANT CHANGE UP (ref 3.5–5.2)
ALP SERPL-CCNC: 101 U/L — SIGNIFICANT CHANGE UP (ref 30–115)
ALP SERPL-CCNC: 101 U/L — SIGNIFICANT CHANGE UP (ref 30–115)
ALT FLD-CCNC: 8 U/L — SIGNIFICANT CHANGE UP (ref 0–41)
ALT FLD-CCNC: 8 U/L — SIGNIFICANT CHANGE UP (ref 0–41)
ANION GAP SERPL CALC-SCNC: 20 MMOL/L — HIGH (ref 7–14)
ANION GAP SERPL CALC-SCNC: 20 MMOL/L — HIGH (ref 7–14)
APTT BLD: 29.4 SEC — SIGNIFICANT CHANGE UP (ref 27–39.2)
APTT BLD: 29.4 SEC — SIGNIFICANT CHANGE UP (ref 27–39.2)
APTT BLD: 60.9 SEC — HIGH (ref 27–39.2)
APTT BLD: 60.9 SEC — HIGH (ref 27–39.2)
AST SERPL-CCNC: 8 U/L — SIGNIFICANT CHANGE UP (ref 0–41)
AST SERPL-CCNC: 8 U/L — SIGNIFICANT CHANGE UP (ref 0–41)
BASOPHILS # BLD AUTO: 0.05 K/UL — SIGNIFICANT CHANGE UP (ref 0–0.2)
BASOPHILS # BLD AUTO: 0.05 K/UL — SIGNIFICANT CHANGE UP (ref 0–0.2)
BASOPHILS NFR BLD AUTO: 0.5 % — SIGNIFICANT CHANGE UP (ref 0–1)
BASOPHILS NFR BLD AUTO: 0.5 % — SIGNIFICANT CHANGE UP (ref 0–1)
BILIRUB SERPL-MCNC: 0.2 MG/DL — SIGNIFICANT CHANGE UP (ref 0.2–1.2)
BILIRUB SERPL-MCNC: 0.2 MG/DL — SIGNIFICANT CHANGE UP (ref 0.2–1.2)
BUN SERPL-MCNC: 88 MG/DL — CRITICAL HIGH (ref 10–20)
BUN SERPL-MCNC: 88 MG/DL — CRITICAL HIGH (ref 10–20)
CALCIUM SERPL-MCNC: 8.6 MG/DL — SIGNIFICANT CHANGE UP (ref 8.4–10.4)
CALCIUM SERPL-MCNC: 8.6 MG/DL — SIGNIFICANT CHANGE UP (ref 8.4–10.4)
CHLORIDE SERPL-SCNC: 100 MMOL/L — SIGNIFICANT CHANGE UP (ref 98–110)
CHLORIDE SERPL-SCNC: 100 MMOL/L — SIGNIFICANT CHANGE UP (ref 98–110)
CO2 SERPL-SCNC: 18 MMOL/L — SIGNIFICANT CHANGE UP (ref 17–32)
CO2 SERPL-SCNC: 18 MMOL/L — SIGNIFICANT CHANGE UP (ref 17–32)
CREAT SERPL-MCNC: 9 MG/DL — CRITICAL HIGH (ref 0.7–1.5)
CREAT SERPL-MCNC: 9 MG/DL — CRITICAL HIGH (ref 0.7–1.5)
EGFR: 6 ML/MIN/1.73M2 — LOW
EGFR: 6 ML/MIN/1.73M2 — LOW
EOSINOPHIL # BLD AUTO: 0.51 K/UL — SIGNIFICANT CHANGE UP (ref 0–0.7)
EOSINOPHIL # BLD AUTO: 0.51 K/UL — SIGNIFICANT CHANGE UP (ref 0–0.7)
EOSINOPHIL NFR BLD AUTO: 5.1 % — SIGNIFICANT CHANGE UP (ref 0–8)
EOSINOPHIL NFR BLD AUTO: 5.1 % — SIGNIFICANT CHANGE UP (ref 0–8)
GLUCOSE BLDC GLUCOMTR-MCNC: 145 MG/DL — HIGH (ref 70–99)
GLUCOSE BLDC GLUCOMTR-MCNC: 145 MG/DL — HIGH (ref 70–99)
GLUCOSE SERPL-MCNC: 149 MG/DL — HIGH (ref 70–99)
GLUCOSE SERPL-MCNC: 149 MG/DL — HIGH (ref 70–99)
HCT VFR BLD CALC: 32.7 % — LOW (ref 42–52)
HCT VFR BLD CALC: 32.7 % — LOW (ref 42–52)
HGB BLD-MCNC: 10.4 G/DL — LOW (ref 14–18)
HGB BLD-MCNC: 10.4 G/DL — LOW (ref 14–18)
IMM GRANULOCYTES NFR BLD AUTO: 0.3 % — SIGNIFICANT CHANGE UP (ref 0.1–0.3)
IMM GRANULOCYTES NFR BLD AUTO: 0.3 % — SIGNIFICANT CHANGE UP (ref 0.1–0.3)
INR BLD: 1.1 RATIO — SIGNIFICANT CHANGE UP (ref 0.65–1.3)
INR BLD: 1.1 RATIO — SIGNIFICANT CHANGE UP (ref 0.65–1.3)
LYMPHOCYTES # BLD AUTO: 2.36 K/UL — SIGNIFICANT CHANGE UP (ref 1.2–3.4)
LYMPHOCYTES # BLD AUTO: 2.36 K/UL — SIGNIFICANT CHANGE UP (ref 1.2–3.4)
LYMPHOCYTES # BLD AUTO: 23.4 % — SIGNIFICANT CHANGE UP (ref 20.5–51.1)
LYMPHOCYTES # BLD AUTO: 23.4 % — SIGNIFICANT CHANGE UP (ref 20.5–51.1)
MCHC RBC-ENTMCNC: 27.7 PG — SIGNIFICANT CHANGE UP (ref 27–31)
MCHC RBC-ENTMCNC: 27.7 PG — SIGNIFICANT CHANGE UP (ref 27–31)
MCHC RBC-ENTMCNC: 31.8 G/DL — LOW (ref 32–37)
MCHC RBC-ENTMCNC: 31.8 G/DL — LOW (ref 32–37)
MCV RBC AUTO: 87.2 FL — SIGNIFICANT CHANGE UP (ref 80–94)
MCV RBC AUTO: 87.2 FL — SIGNIFICANT CHANGE UP (ref 80–94)
MONOCYTES # BLD AUTO: 0.7 K/UL — HIGH (ref 0.1–0.6)
MONOCYTES # BLD AUTO: 0.7 K/UL — HIGH (ref 0.1–0.6)
MONOCYTES NFR BLD AUTO: 6.9 % — SIGNIFICANT CHANGE UP (ref 1.7–9.3)
MONOCYTES NFR BLD AUTO: 6.9 % — SIGNIFICANT CHANGE UP (ref 1.7–9.3)
NEUTROPHILS # BLD AUTO: 6.43 K/UL — SIGNIFICANT CHANGE UP (ref 1.4–6.5)
NEUTROPHILS # BLD AUTO: 6.43 K/UL — SIGNIFICANT CHANGE UP (ref 1.4–6.5)
NEUTROPHILS NFR BLD AUTO: 63.8 % — SIGNIFICANT CHANGE UP (ref 42.2–75.2)
NEUTROPHILS NFR BLD AUTO: 63.8 % — SIGNIFICANT CHANGE UP (ref 42.2–75.2)
NRBC # BLD: 0 /100 WBCS — SIGNIFICANT CHANGE UP (ref 0–0)
NRBC # BLD: 0 /100 WBCS — SIGNIFICANT CHANGE UP (ref 0–0)
PLATELET # BLD AUTO: 227 K/UL — SIGNIFICANT CHANGE UP (ref 130–400)
PLATELET # BLD AUTO: 227 K/UL — SIGNIFICANT CHANGE UP (ref 130–400)
PMV BLD: 10.6 FL — HIGH (ref 7.4–10.4)
PMV BLD: 10.6 FL — HIGH (ref 7.4–10.4)
POTASSIUM SERPL-MCNC: 5.6 MMOL/L — HIGH (ref 3.5–5)
POTASSIUM SERPL-MCNC: 5.6 MMOL/L — HIGH (ref 3.5–5)
POTASSIUM SERPL-SCNC: 5.6 MMOL/L — HIGH (ref 3.5–5)
POTASSIUM SERPL-SCNC: 5.6 MMOL/L — HIGH (ref 3.5–5)
PROT SERPL-MCNC: 6.4 G/DL — SIGNIFICANT CHANGE UP (ref 6–8)
PROT SERPL-MCNC: 6.4 G/DL — SIGNIFICANT CHANGE UP (ref 6–8)
PROTHROM AB SERPL-ACNC: 12.6 SEC — SIGNIFICANT CHANGE UP (ref 9.95–12.87)
PROTHROM AB SERPL-ACNC: 12.6 SEC — SIGNIFICANT CHANGE UP (ref 9.95–12.87)
RBC # BLD: 3.75 M/UL — LOW (ref 4.7–6.1)
RBC # BLD: 3.75 M/UL — LOW (ref 4.7–6.1)
RBC # FLD: 14.7 % — HIGH (ref 11.5–14.5)
RBC # FLD: 14.7 % — HIGH (ref 11.5–14.5)
SODIUM SERPL-SCNC: 138 MMOL/L — SIGNIFICANT CHANGE UP (ref 135–146)
SODIUM SERPL-SCNC: 138 MMOL/L — SIGNIFICANT CHANGE UP (ref 135–146)
WBC # BLD: 10.08 K/UL — SIGNIFICANT CHANGE UP (ref 4.8–10.8)
WBC # BLD: 10.08 K/UL — SIGNIFICANT CHANGE UP (ref 4.8–10.8)
WBC # FLD AUTO: 10.08 K/UL — SIGNIFICANT CHANGE UP (ref 4.8–10.8)
WBC # FLD AUTO: 10.08 K/UL — SIGNIFICANT CHANGE UP (ref 4.8–10.8)

## 2023-11-18 PROCEDURE — 99232 SBSQ HOSP IP/OBS MODERATE 35: CPT | Mod: GC

## 2023-11-18 RX ORDER — HEPARIN SODIUM 5000 [USP'U]/ML
6500 INJECTION INTRAVENOUS; SUBCUTANEOUS EVERY 6 HOURS
Refills: 0 | Status: DISCONTINUED | OUTPATIENT
Start: 2023-11-18 | End: 2023-11-25

## 2023-11-18 RX ORDER — HEPARIN SODIUM 5000 [USP'U]/ML
3000 INJECTION INTRAVENOUS; SUBCUTANEOUS EVERY 6 HOURS
Refills: 0 | Status: DISCONTINUED | OUTPATIENT
Start: 2023-11-18 | End: 2023-11-25

## 2023-11-18 RX ORDER — HEPARIN SODIUM 5000 [USP'U]/ML
INJECTION INTRAVENOUS; SUBCUTANEOUS
Qty: 25000 | Refills: 0 | Status: DISCONTINUED | OUTPATIENT
Start: 2023-11-18 | End: 2023-11-20

## 2023-11-18 RX ORDER — LABETALOL HCL 100 MG
10 TABLET ORAL ONCE
Refills: 0 | Status: COMPLETED | OUTPATIENT
Start: 2023-11-18 | End: 2023-11-18

## 2023-11-18 RX ADMIN — MIDODRINE HYDROCHLORIDE 10 MILLIGRAM(S): 2.5 TABLET ORAL at 11:41

## 2023-11-18 RX ADMIN — PREGABALIN 1000 MICROGRAM(S): 225 CAPSULE ORAL at 11:41

## 2023-11-18 RX ADMIN — GABAPENTIN 300 MILLIGRAM(S): 400 CAPSULE ORAL at 06:22

## 2023-11-18 RX ADMIN — GABAPENTIN 300 MILLIGRAM(S): 400 CAPSULE ORAL at 17:46

## 2023-11-18 RX ADMIN — Medication 650 MILLIGRAM(S): at 17:46

## 2023-11-18 RX ADMIN — INSULIN GLARGINE 12 UNIT(S): 100 INJECTION, SOLUTION SUBCUTANEOUS at 21:02

## 2023-11-18 RX ADMIN — MIDODRINE HYDROCHLORIDE 10 MILLIGRAM(S): 2.5 TABLET ORAL at 06:22

## 2023-11-18 RX ADMIN — PIPERACILLIN AND TAZOBACTAM 25 GRAM(S): 4; .5 INJECTION, POWDER, LYOPHILIZED, FOR SOLUTION INTRAVENOUS at 17:46

## 2023-11-18 RX ADMIN — HEPARIN SODIUM 1500 UNIT(S)/HR: 5000 INJECTION INTRAVENOUS; SUBCUTANEOUS at 21:27

## 2023-11-18 RX ADMIN — Medication 1 TABLET(S): at 11:41

## 2023-11-18 RX ADMIN — PIPERACILLIN AND TAZOBACTAM 25 GRAM(S): 4; .5 INJECTION, POWDER, LYOPHILIZED, FOR SOLUTION INTRAVENOUS at 06:22

## 2023-11-18 RX ADMIN — APIXABAN 2.5 MILLIGRAM(S): 2.5 TABLET, FILM COATED ORAL at 06:22

## 2023-11-18 RX ADMIN — Medication 4 UNIT(S): at 08:23

## 2023-11-18 RX ADMIN — Medication 10 MILLIGRAM(S): at 20:13

## 2023-11-18 RX ADMIN — Medication 4 UNIT(S): at 11:59

## 2023-11-18 RX ADMIN — Medication 4 UNIT(S): at 17:46

## 2023-11-18 RX ADMIN — HEPARIN SODIUM 1500 UNIT(S)/HR: 5000 INJECTION INTRAVENOUS; SUBCUTANEOUS at 13:58

## 2023-11-18 RX ADMIN — HEPARIN SODIUM 6500 UNIT(S): 5000 INJECTION INTRAVENOUS; SUBCUTANEOUS at 13:50

## 2023-11-18 RX ADMIN — CHLORHEXIDINE GLUCONATE 1 APPLICATION(S): 213 SOLUTION TOPICAL at 06:23

## 2023-11-18 RX ADMIN — Medication 2: at 11:59

## 2023-11-18 NOTE — PROGRESS NOTE ADULT - SUBJECTIVE AND OBJECTIVE BOX
seen and examined  24 h events noted   no distress        PAST HISTORY  --------------------------------------------------------------------------------  No significant changes to PMH, PSH, FHx, SHx, unless otherwise noted    ALLERGIES & MEDICATIONS  --------------------------------------------------------------------------------  Allergies    Azactam (Hives; Rash)    Intolerances      Standing Inpatient Medications  chlorhexidine 2% Cloths 1 Application(s) Topical <User Schedule>  cyanocobalamin 1000 MICROGram(s) Oral daily  dextrose 5%. 1000 milliLiter(s) IV Continuous <Continuous>  dextrose 5%. 1000 milliLiter(s) IV Continuous <Continuous>  dextrose 50% Injectable 25 Gram(s) IV Push once  dextrose 50% Injectable 12.5 Gram(s) IV Push once  dextrose 50% Injectable 25 Gram(s) IV Push once  gabapentin 300 milliGRAM(s) Oral two times a day  glucagon  Injectable 1 milliGRAM(s) IntraMuscular once  heparin  Infusion.  Unit(s)/Hr IV Continuous <Continuous>  influenza   Vaccine 0.5 milliLiter(s) IntraMuscular once  insulin glargine Injectable (LANTUS) 12 Unit(s) SubCutaneous at bedtime  insulin lispro (ADMELOG) corrective regimen sliding scale   SubCutaneous three times a day before meals  insulin lispro (ADMELOG) corrective regimen sliding scale   SubCutaneous at bedtime  insulin lispro Injectable (ADMELOG) 4 Unit(s) SubCutaneous three times a day before meals  lactobacillus acidophilus 1 Tablet(s) Oral once  midodrine. 10 milliGRAM(s) Oral three times a day  Nephro-danette 1 Tablet(s) Oral daily  piperacillin/tazobactam IVPB.. 3.375 Gram(s) IV Intermittent every 12 hours    PRN Inpatient Medications  acetaminophen     Tablet .. 650 milliGRAM(s) Oral every 6 hours PRN  dextrose Oral Gel 15 Gram(s) Oral once PRN  heparin   Injectable 6500 Unit(s) IV Push every 6 hours PRN  heparin   Injectable 3000 Unit(s) IV Push every 6 hours PRN  melatonin 3 milliGRAM(s) Oral at bedtime PRN  ondansetron Injectable 4 milliGRAM(s) IV Push every 8 hours PRN      VITALS/PHYSICAL EXAM  --------------------------------------------------------------------------------  T(C): 36.8 (11-18-23 @ 05:23), Max: 36.9 (11-17-23 @ 11:51)  HR: 93 (11-18-23 @ 05:23) (62 - 93)  BP: 167/76 (11-18-23 @ 05:23) (111/63 - 175/79)  RR: 20 (11-18-23 @ 05:23) (16 - 20)  SpO2: --  Wt(kg): --  Height (cm): 182.9 (11-17-23 @ 16:32)  Weight (kg): 81.4 (11-17-23 @ 16:32)  BMI (kg/m2): 24.3 (11-17-23 @ 16:32)  BSA (m2): 2.03 (11-17-23 @ 16:32)      11-17-23 @ 07:01  -  11-18-23 @ 07:00  --------------------------------------------------------  IN: 0 mL / OUT: 3000 mL / NET: -3000 mL      Physical Exam:  	Gen: NAD  	Pulm: decrease BS B/L  	CV: RRR, S1S2; no rub  	Back: No spinal or CVA tenderness; no sacral edema  	Abd: +BS, soft, nontender/nondistended  	LE: dressing      LABS/STUDIES  --------------------------------------------------------------------------------                Creatinine Trend:  SCr 9.8 [11-16 @ 04:30]  SCr 8.7 [11-15 @ 15:10]    Urinalysis - [11-16-23 @ 04:30]      Color  / Appearance  / SG  / pH       Gluc 417 / Ketone   / Bili  / Urobili        Blood  / Protein  / Leuk Est  / Nitrite       RBC  / WBC  / Hyaline  / Gran  / Sq Epi  / Non Sq Epi  / Bacteria

## 2023-11-18 NOTE — PROGRESS NOTE ADULT - ASSESSMENT
ESRD on HD TTS in [place]  R foot gangrene  HTN  Normocytic anemia   s/p hd yesterday    decrease midodrine to 5 q8 if bp reamisn ok   check ph level   repeat labs  followed by vascular ? OR next week   will follow

## 2023-11-18 NOTE — CHART NOTE - NSCHARTNOTEFT_GEN_A_CORE
Was notified by nursing staff at approximately 6PM that patient's BP was 184/86, HR 91. Patient states his BP usually runs high before dialysis. Midodrine was held. Repeat BP at 740PM 193/88, HR 90. IV labetalol 10mg ordered. Will continue to monitor.

## 2023-11-18 NOTE — PROGRESS NOTE ADULT - ASSESSMENT
58 year old male with past medical history of Type 2 diabetes (now treated with Insulin), Bilateral lower extremity DVT (on Eliquis),  ESRD (HD on Tues/Thurs/Sat), CAD, morbid obesity, PVD, who now presents in the ER sent in by his podiatrist Dr. Dudley due to gangrenous Right 3rd and 4th toes.      #Infected Gangrenous Right 3rd and 4th toes with Osteomyelitis  #PVD  - [place] pt was on eliquis and not NPO so had to be deferred  - Eliquis reduced to 2.5mg BID and will be held starting 11/18 per vascular recs  - Started on heparin infusion  - S/p HD 11/16  - ID consult --> vanco, zosyn  - Eliquis held 11/18  - Heparin drip to start at 12pm 11/18  - Plan for vascular intervention 11/21  - C/w Abx per ID    #Type 2 diabetes   - Lantus  - Lispro TID   - ISS    #ESRD (HD)   - S/p HD 11/16  - F/u nephro      #CAD  - Not on plavix or statin anymore at home    #MISC  - DVT: heparin  - GI: none  - Diet: renal diet

## 2023-11-18 NOTE — PROGRESS NOTE ADULT - SUBJECTIVE AND OBJECTIVE BOX
Podiatry Progress Note    Subjective:  ALEXANDRO MONROE is a  58y Male.   Seen bedside.   Patient is a 58y old  Male who presents with a chief complaint of Gangrenous Right 3rd toe (18 Nov 2023 10:32)      Past Medical History and Surgical History  PAST MEDICAL & SURGICAL HISTORY:  CAD (coronary artery disease)      DVT (deep venous thrombosis)      ESRD on dialysis      DM (diabetes mellitus)      Morbid obesity      Peripheral vascular disease      H/O varicose vein stripping      S/P dialysis catheter insertion           Objective:  Vital Signs Last 24 Hrs  T(C): 36.1 (18 Nov 2023 13:04), Max: 36.8 (18 Nov 2023 05:23)  T(F): 97 (18 Nov 2023 13:04), Max: 98.3 (18 Nov 2023 05:23)  HR: 92 (18 Nov 2023 13:04) (83 - 93)  BP: 180/86 (18 Nov 2023 13:04) (111/63 - 180/86)  BP(mean): 104 (18 Nov 2023 11:39) (104 - 104)  RR: 18 (18 Nov 2023 13:04) (17 - 20)  SpO2: --    Parameters below as of 18 Nov 2023 11:39  Patient On (Oxygen Delivery Method): room air                    Physical Exam - Right Lower Extremity Focused:   Derm: Right foot dry eschar to the  3rd, and 4th toes, no purulent drainage, mild malodor, no erythema.   Vascular: DP and PT Pulses Diminished; Foot is Warm to Warm to the touch;  Neuro: Protective Sensation Diminished / Moderately Neuropathic   MSK: Pain On Palpation at Wound Site     Assessment:  Right foot dry gangrene    Plan:  Chart reviewed and Patient evaluated. All Questions and Concerns Addressed and Answered  Local Wound Care; Wound Flushed w/ NS; Wound Packed w/ Betadine Soaked Gauze / DSD / Kerlix; continue q24h  Weight Bearing Status; WBAT w/ Heel Touch w/ Surgical Shoe;   Patient is scheduled for vascular procedure on Tues 11/21  Will follow up with plan for amputation of gangrenous toes right foot after revascularization  Discussed Plan w/ Dr. Gallegos.

## 2023-11-18 NOTE — PROGRESS NOTE ADULT - SUBJECTIVE AND OBJECTIVE BOX
58 year old male with past medical history of Type 2 diabetes (now treated with Insulin), Bilateral lower extremity DVT (on Eliquis),  ESRD (HD on Tues/Thurs/Sat), CAD, morbid obesity, PVD, who now presents in the ER sent in by his podiatrist Dr. Dudley due to gangrenous Right 3rd and 4th toes. Patient states he started having an ulceration and drainage from his right 3rd and 4th toes about 3 weeks ago. The patient was seen by his PCP in [place] who prescribed him a cream that irritated the skin on his toes and made things worse. Patient stated that his toes worsened and then turned black and started to smell. He was seen by a PCP on  who recommended him to see Podiatry and go to ER. Denies complaint of foot or toe pain, fever, chills, tremors, N/V/D, CP or SOB. Denies any chest pain, SOB or palpitations  Laying comfortably in bed denies any pain in his foot. The patient was brought from [facility] for a LE angiography possible PTA was postponed as the patient received Eliquis 5 mg at noon time Thursday. The patient's son asking if the procedure can be done earlier than Tuesday to prevent things from worsening by delaying the procedure            MEDICATIONS  (STANDING):  chlorhexidine 2% Cloths 1 Application(s) Topical <User Schedule>  cyanocobalamin 1000 MICROGram(s) Oral daily  dextrose 5%. 1000 milliLiter(s) (100 mL/Hr) IV Continuous <Continuous>  dextrose 5%. 1000 milliLiter(s) (50 mL/Hr) IV Continuous <Continuous>  dextrose 50% Injectable 25 Gram(s) IV Push once  dextrose 50% Injectable 12.5 Gram(s) IV Push once  dextrose 50% Injectable 25 Gram(s) IV Push once  gabapentin 300 milliGRAM(s) Oral two times a day  glucagon  Injectable 1 milliGRAM(s) IntraMuscular once  heparin  Infusion.  Unit(s)/Hr (15 mL/Hr) IV Continuous <Continuous>  influenza   Vaccine 0.5 milliLiter(s) IntraMuscular once  insulin glargine Injectable (LANTUS) 12 Unit(s) SubCutaneous at bedtime  insulin lispro (ADMELOG) corrective regimen sliding scale   SubCutaneous three times a day before meals  insulin lispro (ADMELOG) corrective regimen sliding scale   SubCutaneous at bedtime  insulin lispro Injectable (ADMELOG) 4 Unit(s) SubCutaneous three times a day before meals  labetalol Injectable 10 milliGRAM(s) IV Push once  lactobacillus acidophilus 1 Tablet(s) Oral once  midodrine. 10 milliGRAM(s) Oral three times a day  Nephro-danette 1 Tablet(s) Oral daily  piperacillin/tazobactam IVPB.. 3.375 Gram(s) IV Intermittent every 12 hours    MEDICATIONS  (PRN):  acetaminophen     Tablet .. 650 milliGRAM(s) Oral every 6 hours PRN Temp greater or equal to 38C (100.4F), Mild Pain (1 - 3)  dextrose Oral Gel 15 Gram(s) Oral once PRN Blood Glucose LESS THAN 70 milliGRAM(s)/deciliter  heparin   Injectable 6500 Unit(s) IV Push every 6 hours PRN For aPTT less than 40  heparin   Injectable 3000 Unit(s) IV Push every 6 hours PRN For aPTT between 40 - 57  melatonin 3 milliGRAM(s) Oral at bedtime PRN Insomnia  ondansetron Injectable 4 milliGRAM(s) IV Push every 8 hours PRN Nausea and/or Vomiting            Vital Signs Last 24 Hrs  T(C): 36.1 (18 Nov 2023 13:04), Max: 36.8 (18 Nov 2023 05:23)  T(F): 97 (18 Nov 2023 13:04), Max: 98.3 (18 Nov 2023 05:23)  HR: 90 (18 Nov 2023 19:30) (86 - 93)  BP: 193/88 (18 Nov 2023 19:30) (152/72 - 193/88)  BP(mean): 126 (18 Nov 2023 19:30) (104 - 126)  RR: 18 (18 Nov 2023 13:04) (18 - 20)  SpO2: --    Parameters below as of 18 Nov 2023 11:39  Patient On (Oxygen Delivery Method): room air         REVIEW OF SYSTEMS:  CONSTITUTIONAL: No fever, weight loss, or fatigue  CARDIOLOGY: Patient denies chest pain, shortness of breath or syncopal episodes.   RESPIRATORY: denies shortness of breath, wheezeing.   NEUROLOGICAL: NO weakness, no focal deficits to report.  GI: no BRBPR, no N,V,diarrhea.    PSYCHIATRY: normal mood and affect  HEENT: no nasal discharge, no ecchymosis  SKIN: no ecchymosis, no breakdown  MUSCULOSKELETAL: Full range of motion x4.        PHYSICAL EXAM:  · CONSTITUTIONAL:	Well-developed, well nourished      · RESPIRATORY:   airway patent; breath sounds equal; good air movement; respirations non-labored; clear to auscultation bilaterally   · CARDIOVASCULAR	regular rate and rhythm  no rub  + SE murmur    · EXTREMITIES: No cyanosis, clubbing or edema  · VASCULAR: 	right foot covered. Absent AT/PT   	  LABS:      PT/INR - ( 18 Nov 2023 12:08 )   PT: 12.60 sec;   INR: 1.10 ratio         PTT - ( 18 Nov 2023 12:08 )  PTT:29.4 sec    I&O's Summary    17 Nov 2023 07:01  -  18 Nov 2023 07:00  --------------------------------------------------------  IN: 0 mL / OUT: 3000 mL / NET: -3000 mL      BNP  RADIOLOGY & ADDITIONAL STUDIES:    IMPRESSION AND PLAN:    Continue current care  ID and Podiatry follow up   GI prophylaxis   Will call Cath lab manager to see if emergency team will come to do the LE angiogram in the cath lab tonight or tomorrow (Raquel was contacted and jaja loganld the STEMI team will not come to do peripheral procedures on the weekend but will try to accommodate the procedure probably Monday if logistically possible). The patient was offered to call vascular surgery to see if surgical intervention can be done over the weekend but the patient wants to keep his procedure on schedule    IV heaparin for now and keep holding Eliquis   Will F/U

## 2023-11-18 NOTE — PROGRESS NOTE ADULT - NUTRITIONAL ASSESSMENT
The risks and possible complications of the procedure were fully discussed with the patient in the presence of his wife and son. They understand and wish to proceed

## 2023-11-18 NOTE — PROGRESS NOTE ADULT - SUBJECTIVE AND OBJECTIVE BOX
24H events:    Patient is a 58y old Male who presents with a chief complaint of Gangrenous Right 3rd toe (17 Nov 2023 12:55)    Primary diagnosis of Gangrenous toe      Today is hospital day 3d. This morning patient was seen and examined at bedside, resting comfortably in bed.    No acute or major events overnight.    Code Status: full code    PAST MEDICAL & SURGICAL HISTORY  CAD (coronary artery disease)    DVT (deep venous thrombosis)    ESRD on dialysis    DM (diabetes mellitus)    Morbid obesity    Peripheral vascular disease    H/O varicose vein stripping    S/P dialysis catheter insertion    SOCIAL HISTORY:  Social History:  Tobacco use:  Denies current use.  Former smoker.  Alcohol use:  Denies use.  Drug use:  Denies use. (15 Nov 2023 18:38)    ALLERGIES:  Azactam (Hives; Rash)    MEDICATIONS:  STANDING MEDICATIONS  chlorhexidine 2% Cloths 1 Application(s) Topical <User Schedule>  cyanocobalamin 1000 MICROGram(s) Oral daily  dextrose 5%. 1000 milliLiter(s) IV Continuous <Continuous>  dextrose 5%. 1000 milliLiter(s) IV Continuous <Continuous>  dextrose 50% Injectable 25 Gram(s) IV Push once  dextrose 50% Injectable 12.5 Gram(s) IV Push once  dextrose 50% Injectable 25 Gram(s) IV Push once  gabapentin 300 milliGRAM(s) Oral two times a day  glucagon  Injectable 1 milliGRAM(s) IntraMuscular once  heparin  Infusion.  Unit(s)/Hr IV Continuous <Continuous>  influenza   Vaccine 0.5 milliLiter(s) IntraMuscular once  insulin glargine Injectable (LANTUS) 12 Unit(s) SubCutaneous at bedtime  insulin lispro (ADMELOG) corrective regimen sliding scale   SubCutaneous three times a day before meals  insulin lispro (ADMELOG) corrective regimen sliding scale   SubCutaneous at bedtime  insulin lispro Injectable (ADMELOG) 4 Unit(s) SubCutaneous three times a day before meals  lactobacillus acidophilus 1 Tablet(s) Oral once  midodrine. 10 milliGRAM(s) Oral three times a day  Nephro-danette 1 Tablet(s) Oral daily  piperacillin/tazobactam IVPB.. 3.375 Gram(s) IV Intermittent every 12 hours    PRN MEDICATIONS  acetaminophen     Tablet .. 650 milliGRAM(s) Oral every 6 hours PRN  dextrose Oral Gel 15 Gram(s) Oral once PRN  heparin   Injectable 6500 Unit(s) IV Push every 6 hours PRN  heparin   Injectable 3000 Unit(s) IV Push every 6 hours PRN  melatonin 3 milliGRAM(s) Oral at bedtime PRN  ondansetron Injectable 4 milliGRAM(s) IV Push every 8 hours PRN    ROS:   no headaches, no dizziness, no fevers, no chills, no CP/SOB, no abdominal pain, no n/v/d, no hematochezia, no burning with urination, no hematuria, no weakness or tingling    I&O:  I&O's Summary    17 Nov 2023 07:01  -  18 Nov 2023 07:00  --------------------------------------------------------  IN: 0 mL / OUT: 3000 mL / NET: -3000 mL        VITALS:   ICU Vital Signs Last 24 Hrs  T(C): 36.8 (18 Nov 2023 05:23), Max: 36.9 (17 Nov 2023 11:51)  T(F): 98.3 (18 Nov 2023 05:23), Max: 98.4 (17 Nov 2023 11:51)  HR: 93 (18 Nov 2023 05:23) (62 - 93)  BP: 167/76 (18 Nov 2023 05:23) (111/63 - 175/79)  BP(mean): --  ABP: --  ABP(mean): --  RR: 20 (18 Nov 2023 05:23) (16 - 20)  SpO2: --    O2 Parameters below as of 17 Nov 2023 15:30  Patient On (Oxygen Delivery Method): room air    PHYSICAL EXAM:  GENERAL: lying in bed comfortably  HEAD: normal  HEART: RRR  LUNGS: CTA b/l  ABDOMEN: soft nontender nondistended  EXTREMITIES: feet wrapped in bandage   NERVOUS SYSTEM:  A&OX3    Lines: none    LABS:      Culture - Blood (collected 15 Nov 2023 15:10)  Source: .Blood Blood  Preliminary Report (17 Nov 2023 23:01):    No growth at 48 Hours    Culture - Blood (collected 15 Nov 2023 15:10)  Source: .Blood Blood  Preliminary Report (17 Nov 2023 23:01):    No growth at 48 Hours      RADIOLOGY:  < from: VA Duplex Lower Extrem Arterial, Bilat (11.15.23 @ 20:35) >  Impression:  High-grade stenosis of the right common femoral artery. Moderate stenosis   of the right popliteal artery.    Normal arterial flow in theleft lower extremity.    < end of copied text >

## 2023-11-19 LAB
ALBUMIN SERPL ELPH-MCNC: 3.1 G/DL — LOW (ref 3.5–5.2)
ALBUMIN SERPL ELPH-MCNC: 3.1 G/DL — LOW (ref 3.5–5.2)
ALP SERPL-CCNC: 95 U/L — SIGNIFICANT CHANGE UP (ref 30–115)
ALP SERPL-CCNC: 95 U/L — SIGNIFICANT CHANGE UP (ref 30–115)
ALT FLD-CCNC: 8 U/L — SIGNIFICANT CHANGE UP (ref 0–41)
ALT FLD-CCNC: 8 U/L — SIGNIFICANT CHANGE UP (ref 0–41)
ANION GAP SERPL CALC-SCNC: 19 MMOL/L — HIGH (ref 7–14)
ANION GAP SERPL CALC-SCNC: 19 MMOL/L — HIGH (ref 7–14)
APTT BLD: 53.3 SEC — HIGH (ref 27–39.2)
APTT BLD: 53.3 SEC — HIGH (ref 27–39.2)
APTT BLD: 54.2 SEC — HIGH (ref 27–39.2)
APTT BLD: 54.2 SEC — HIGH (ref 27–39.2)
APTT BLD: 65.8 SEC — HIGH (ref 27–39.2)
APTT BLD: 65.8 SEC — HIGH (ref 27–39.2)
AST SERPL-CCNC: 9 U/L — SIGNIFICANT CHANGE UP (ref 0–41)
AST SERPL-CCNC: 9 U/L — SIGNIFICANT CHANGE UP (ref 0–41)
BASOPHILS # BLD AUTO: 0.04 K/UL — SIGNIFICANT CHANGE UP (ref 0–0.2)
BASOPHILS # BLD AUTO: 0.04 K/UL — SIGNIFICANT CHANGE UP (ref 0–0.2)
BASOPHILS NFR BLD AUTO: 0.5 % — SIGNIFICANT CHANGE UP (ref 0–1)
BASOPHILS NFR BLD AUTO: 0.5 % — SIGNIFICANT CHANGE UP (ref 0–1)
BILIRUB SERPL-MCNC: 0.3 MG/DL — SIGNIFICANT CHANGE UP (ref 0.2–1.2)
BILIRUB SERPL-MCNC: 0.3 MG/DL — SIGNIFICANT CHANGE UP (ref 0.2–1.2)
BUN SERPL-MCNC: 94 MG/DL — CRITICAL HIGH (ref 10–20)
BUN SERPL-MCNC: 94 MG/DL — CRITICAL HIGH (ref 10–20)
CALCIUM SERPL-MCNC: 8.4 MG/DL — SIGNIFICANT CHANGE UP (ref 8.4–10.5)
CALCIUM SERPL-MCNC: 8.4 MG/DL — SIGNIFICANT CHANGE UP (ref 8.4–10.5)
CHLORIDE SERPL-SCNC: 99 MMOL/L — SIGNIFICANT CHANGE UP (ref 98–110)
CHLORIDE SERPL-SCNC: 99 MMOL/L — SIGNIFICANT CHANGE UP (ref 98–110)
CO2 SERPL-SCNC: 18 MMOL/L — SIGNIFICANT CHANGE UP (ref 17–32)
CO2 SERPL-SCNC: 18 MMOL/L — SIGNIFICANT CHANGE UP (ref 17–32)
CREAT SERPL-MCNC: 9.2 MG/DL — CRITICAL HIGH (ref 0.7–1.5)
CREAT SERPL-MCNC: 9.2 MG/DL — CRITICAL HIGH (ref 0.7–1.5)
EGFR: 6 ML/MIN/1.73M2 — LOW
EGFR: 6 ML/MIN/1.73M2 — LOW
EOSINOPHIL # BLD AUTO: 0.47 K/UL — SIGNIFICANT CHANGE UP (ref 0–0.7)
EOSINOPHIL # BLD AUTO: 0.47 K/UL — SIGNIFICANT CHANGE UP (ref 0–0.7)
EOSINOPHIL NFR BLD AUTO: 5.6 % — SIGNIFICANT CHANGE UP (ref 0–8)
EOSINOPHIL NFR BLD AUTO: 5.6 % — SIGNIFICANT CHANGE UP (ref 0–8)
GLUCOSE SERPL-MCNC: 140 MG/DL — HIGH (ref 70–99)
GLUCOSE SERPL-MCNC: 140 MG/DL — HIGH (ref 70–99)
HCT VFR BLD CALC: 31.6 % — LOW (ref 42–52)
HCT VFR BLD CALC: 31.6 % — LOW (ref 42–52)
HGB BLD-MCNC: 10 G/DL — LOW (ref 14–18)
HGB BLD-MCNC: 10 G/DL — LOW (ref 14–18)
IMM GRANULOCYTES NFR BLD AUTO: 0.2 % — SIGNIFICANT CHANGE UP (ref 0.1–0.3)
IMM GRANULOCYTES NFR BLD AUTO: 0.2 % — SIGNIFICANT CHANGE UP (ref 0.1–0.3)
LYMPHOCYTES # BLD AUTO: 2.04 K/UL — SIGNIFICANT CHANGE UP (ref 1.2–3.4)
LYMPHOCYTES # BLD AUTO: 2.04 K/UL — SIGNIFICANT CHANGE UP (ref 1.2–3.4)
LYMPHOCYTES # BLD AUTO: 24.1 % — SIGNIFICANT CHANGE UP (ref 20.5–51.1)
LYMPHOCYTES # BLD AUTO: 24.1 % — SIGNIFICANT CHANGE UP (ref 20.5–51.1)
MAGNESIUM SERPL-MCNC: 1.9 MG/DL — SIGNIFICANT CHANGE UP (ref 1.8–2.4)
MAGNESIUM SERPL-MCNC: 1.9 MG/DL — SIGNIFICANT CHANGE UP (ref 1.8–2.4)
MCHC RBC-ENTMCNC: 27.5 PG — SIGNIFICANT CHANGE UP (ref 27–31)
MCHC RBC-ENTMCNC: 27.5 PG — SIGNIFICANT CHANGE UP (ref 27–31)
MCHC RBC-ENTMCNC: 31.6 G/DL — LOW (ref 32–37)
MCHC RBC-ENTMCNC: 31.6 G/DL — LOW (ref 32–37)
MCV RBC AUTO: 87.1 FL — SIGNIFICANT CHANGE UP (ref 80–94)
MCV RBC AUTO: 87.1 FL — SIGNIFICANT CHANGE UP (ref 80–94)
MONOCYTES # BLD AUTO: 0.6 K/UL — SIGNIFICANT CHANGE UP (ref 0.1–0.6)
MONOCYTES # BLD AUTO: 0.6 K/UL — SIGNIFICANT CHANGE UP (ref 0.1–0.6)
MONOCYTES NFR BLD AUTO: 7.1 % — SIGNIFICANT CHANGE UP (ref 1.7–9.3)
MONOCYTES NFR BLD AUTO: 7.1 % — SIGNIFICANT CHANGE UP (ref 1.7–9.3)
NEUTROPHILS # BLD AUTO: 5.28 K/UL — SIGNIFICANT CHANGE UP (ref 1.4–6.5)
NEUTROPHILS # BLD AUTO: 5.28 K/UL — SIGNIFICANT CHANGE UP (ref 1.4–6.5)
NEUTROPHILS NFR BLD AUTO: 62.5 % — SIGNIFICANT CHANGE UP (ref 42.2–75.2)
NEUTROPHILS NFR BLD AUTO: 62.5 % — SIGNIFICANT CHANGE UP (ref 42.2–75.2)
NRBC # BLD: 0 /100 WBCS — SIGNIFICANT CHANGE UP (ref 0–0)
NRBC # BLD: 0 /100 WBCS — SIGNIFICANT CHANGE UP (ref 0–0)
PLATELET # BLD AUTO: 200 K/UL — SIGNIFICANT CHANGE UP (ref 130–400)
PLATELET # BLD AUTO: 200 K/UL — SIGNIFICANT CHANGE UP (ref 130–400)
PMV BLD: 10.8 FL — HIGH (ref 7.4–10.4)
PMV BLD: 10.8 FL — HIGH (ref 7.4–10.4)
POTASSIUM SERPL-MCNC: 5.2 MMOL/L — HIGH (ref 3.5–5)
POTASSIUM SERPL-MCNC: 5.2 MMOL/L — HIGH (ref 3.5–5)
POTASSIUM SERPL-SCNC: 5.2 MMOL/L — HIGH (ref 3.5–5)
POTASSIUM SERPL-SCNC: 5.2 MMOL/L — HIGH (ref 3.5–5)
PROT SERPL-MCNC: 6.1 G/DL — SIGNIFICANT CHANGE UP (ref 6–8)
PROT SERPL-MCNC: 6.1 G/DL — SIGNIFICANT CHANGE UP (ref 6–8)
RBC # BLD: 3.63 M/UL — LOW (ref 4.7–6.1)
RBC # BLD: 3.63 M/UL — LOW (ref 4.7–6.1)
RBC # FLD: 14.9 % — HIGH (ref 11.5–14.5)
RBC # FLD: 14.9 % — HIGH (ref 11.5–14.5)
SODIUM SERPL-SCNC: 136 MMOL/L — SIGNIFICANT CHANGE UP (ref 135–146)
SODIUM SERPL-SCNC: 136 MMOL/L — SIGNIFICANT CHANGE UP (ref 135–146)
WBC # BLD: 8.45 K/UL — SIGNIFICANT CHANGE UP (ref 4.8–10.8)
WBC # BLD: 8.45 K/UL — SIGNIFICANT CHANGE UP (ref 4.8–10.8)
WBC # FLD AUTO: 8.45 K/UL — SIGNIFICANT CHANGE UP (ref 4.8–10.8)
WBC # FLD AUTO: 8.45 K/UL — SIGNIFICANT CHANGE UP (ref 4.8–10.8)

## 2023-11-19 PROCEDURE — 99232 SBSQ HOSP IP/OBS MODERATE 35: CPT

## 2023-11-19 RX ORDER — HYDRALAZINE HCL 50 MG
5 TABLET ORAL ONCE
Refills: 0 | Status: COMPLETED | OUTPATIENT
Start: 2023-11-19 | End: 2023-11-19

## 2023-11-19 RX ADMIN — HEPARIN SODIUM 3000 UNIT(S): 5000 INJECTION INTRAVENOUS; SUBCUTANEOUS at 18:51

## 2023-11-19 RX ADMIN — Medication 4 UNIT(S): at 17:05

## 2023-11-19 RX ADMIN — HEPARIN SODIUM 1700 UNIT(S)/HR: 5000 INJECTION INTRAVENOUS; SUBCUTANEOUS at 07:51

## 2023-11-19 RX ADMIN — PREGABALIN 1000 MICROGRAM(S): 225 CAPSULE ORAL at 12:13

## 2023-11-19 RX ADMIN — MIDODRINE HYDROCHLORIDE 10 MILLIGRAM(S): 2.5 TABLET ORAL at 05:51

## 2023-11-19 RX ADMIN — Medication 2: at 08:05

## 2023-11-19 RX ADMIN — Medication 4 UNIT(S): at 12:12

## 2023-11-19 RX ADMIN — PIPERACILLIN AND TAZOBACTAM 25 GRAM(S): 4; .5 INJECTION, POWDER, LYOPHILIZED, FOR SOLUTION INTRAVENOUS at 17:05

## 2023-11-19 RX ADMIN — HEPARIN SODIUM 1700 UNIT(S)/HR: 5000 INJECTION INTRAVENOUS; SUBCUTANEOUS at 02:16

## 2023-11-19 RX ADMIN — PIPERACILLIN AND TAZOBACTAM 25 GRAM(S): 4; .5 INJECTION, POWDER, LYOPHILIZED, FOR SOLUTION INTRAVENOUS at 05:51

## 2023-11-19 RX ADMIN — HEPARIN SODIUM 1700 UNIT(S)/HR: 5000 INJECTION INTRAVENOUS; SUBCUTANEOUS at 09:25

## 2023-11-19 RX ADMIN — GABAPENTIN 300 MILLIGRAM(S): 400 CAPSULE ORAL at 17:04

## 2023-11-19 RX ADMIN — GABAPENTIN 300 MILLIGRAM(S): 400 CAPSULE ORAL at 05:51

## 2023-11-19 RX ADMIN — Medication 4 UNIT(S): at 08:05

## 2023-11-19 RX ADMIN — HEPARIN SODIUM 1900 UNIT(S)/HR: 5000 INJECTION INTRAVENOUS; SUBCUTANEOUS at 18:46

## 2023-11-19 RX ADMIN — Medication 1 TABLET(S): at 12:13

## 2023-11-19 RX ADMIN — Medication 5 MILLIGRAM(S): at 21:50

## 2023-11-19 RX ADMIN — Medication 2: at 12:12

## 2023-11-19 NOTE — PROGRESS NOTE ADULT - SUBJECTIVE AND OBJECTIVE BOX
SUBJ:      MEDICATIONS  (STANDING):  chlorhexidine 2% Cloths 1 Application(s) Topical <User Schedule>  cyanocobalamin 1000 MICROGram(s) Oral daily  dextrose 5%. 1000 milliLiter(s) (50 mL/Hr) IV Continuous <Continuous>  dextrose 5%. 1000 milliLiter(s) (100 mL/Hr) IV Continuous <Continuous>  dextrose 50% Injectable 25 Gram(s) IV Push once  dextrose 50% Injectable 12.5 Gram(s) IV Push once  dextrose 50% Injectable 25 Gram(s) IV Push once  gabapentin 300 milliGRAM(s) Oral two times a day  glucagon  Injectable 1 milliGRAM(s) IntraMuscular once  heparin  Infusion.  Unit(s)/Hr (15 mL/Hr) IV Continuous <Continuous>  influenza   Vaccine 0.5 milliLiter(s) IntraMuscular once  insulin glargine Injectable (LANTUS) 12 Unit(s) SubCutaneous at bedtime  insulin lispro (ADMELOG) corrective regimen sliding scale   SubCutaneous three times a day before meals  insulin lispro (ADMELOG) corrective regimen sliding scale   SubCutaneous at bedtime  insulin lispro Injectable (ADMELOG) 4 Unit(s) SubCutaneous three times a day before meals  lactobacillus acidophilus 1 Tablet(s) Oral once  Nephro-danette 1 Tablet(s) Oral daily  piperacillin/tazobactam IVPB.. 3.375 Gram(s) IV Intermittent every 12 hours    MEDICATIONS  (PRN):  acetaminophen     Tablet .. 650 milliGRAM(s) Oral every 6 hours PRN Temp greater or equal to 38C (100.4F), Mild Pain (1 - 3)  dextrose Oral Gel 15 Gram(s) Oral once PRN Blood Glucose LESS THAN 70 milliGRAM(s)/deciliter  heparin   Injectable 6500 Unit(s) IV Push every 6 hours PRN For aPTT less than 40  heparin   Injectable 3000 Unit(s) IV Push every 6 hours PRN For aPTT between 40 - 57  melatonin 3 milliGRAM(s) Oral at bedtime PRN Insomnia  ondansetron Injectable 4 milliGRAM(s) IV Push every 8 hours PRN Nausea and/or Vomiting            Vital Signs Last 24 Hrs  T(C): 36.1 (19 Nov 2023 20:00), Max: 36.5 (19 Nov 2023 04:41)  T(F): 96.9 (19 Nov 2023 20:00), Max: 97.7 (19 Nov 2023 04:41)  HR: 84 (19 Nov 2023 20:55) (84 - 92)  BP: 180/86 (19 Nov 2023 20:55) (124/77 - 196/89)  BP(mean): 123 (19 Nov 2023 20:55) (91 - 123)  RR: 18 (19 Nov 2023 20:00) (18 - 18)  SpO2: 98% (19 Nov 2023 20:00) (98% - 98%)    Parameters below as of 19 Nov 2023 20:00  Patient On (Oxygen Delivery Method): room air         REVIEW OF SYSTEMS:  CONSTITUTIONAL: No fever, weight loss, or fatigue  CARDIOLOGY: PAtient denies chest pain, shortness of breath or syncopal episodes.   RESPIRATORY: denies shortness of breath, wheezeing.   NEUROLOGICAL: NO weakness, no focal deficits to report.  GI: no BRBPR, no N,V,diarrhea.    PSYCHIATRY: normal mood and affect  HEENT: no nasal discharge, no ecchymosis  SKIN: no ecchymosis, no breakdown  MUSCULOSKELETAL: Full range of motion x4.        PHYSICAL EXAM:  · CONSTITUTIONAL:	Well-developed, well nourished    BMI-  ·RESPIRATORY:   airway patent; breath sounds equal; good air movement; respirations non-labored; clear to auscultation bilaterally; no chest wall tenderness; no intercostal retractions; no rales,rhonchi or wheeze  · CARDIOVASCULAR	regular rate and rhythm  no rub  no murmur  normal PMI  · EXTREMITIES: No cyanosis, clubbing or edema  · VASCULAR: 	Equal and normal pulses (carotid, femoral, dorsalis pedis)  	  TELEMETRY:    ECG:    TTE:    LABS:                        10.0   8.45  )-----------( 200      ( 19 Nov 2023 07:08 )             31.6     11-19    136  |  99  |  94<HH>  ----------------------------<  140<H>  5.2<H>   |  18  |  9.2<HH>    Ca    8.4      19 Nov 2023 07:08  Mg     1.9     11-19    TPro  6.1  /  Alb  3.1<L>  /  TBili  0.3  /  DBili  x   /  AST  9   /  ALT  8   /  AlkPhos  95  11-19        PT/INR - ( 18 Nov 2023 12:08 )   PT: 12.60 sec;   INR: 1.10 ratio         PTT - ( 19 Nov 2023 17:24 )  PTT:54.2 sec    I&O's Summary    BNP  RADIOLOGY & ADDITIONAL STUDIES:    IMPRESSION AND PLAN:         Denies any chest pain, SOB or palpitations   The patient is scheduled for LE angiography in AM tomorrow       MEDICATIONS  (STANDING):  chlorhexidine 2% Cloths 1 Application(s) Topical <User Schedule>  cyanocobalamin 1000 MICROGram(s) Oral daily  dextrose 5%. 1000 milliLiter(s) (50 mL/Hr) IV Continuous <Continuous>  dextrose 5%. 1000 milliLiter(s) (100 mL/Hr) IV Continuous <Continuous>  dextrose 50% Injectable 25 Gram(s) IV Push once  dextrose 50% Injectable 12.5 Gram(s) IV Push once  dextrose 50% Injectable 25 Gram(s) IV Push once  gabapentin 300 milliGRAM(s) Oral two times a day  glucagon  Injectable 1 milliGRAM(s) IntraMuscular once  heparin  Infusion.  Unit(s)/Hr (15 mL/Hr) IV Continuous <Continuous>  influenza   Vaccine 0.5 milliLiter(s) IntraMuscular once  insulin glargine Injectable (LANTUS) 12 Unit(s) SubCutaneous at bedtime  insulin lispro (ADMELOG) corrective regimen sliding scale   SubCutaneous three times a day before meals  insulin lispro (ADMELOG) corrective regimen sliding scale   SubCutaneous at bedtime  insulin lispro Injectable (ADMELOG) 4 Unit(s) SubCutaneous three times a day before meals  lactobacillus acidophilus 1 Tablet(s) Oral once  Nephro-danette 1 Tablet(s) Oral daily  piperacillin/tazobactam IVPB.. 3.375 Gram(s) IV Intermittent every 12 hours    MEDICATIONS  (PRN):  acetaminophen     Tablet .. 650 milliGRAM(s) Oral every 6 hours PRN Temp greater or equal to 38C (100.4F), Mild Pain (1 - 3)  dextrose Oral Gel 15 Gram(s) Oral once PRN Blood Glucose LESS THAN 70 milliGRAM(s)/deciliter  heparin   Injectable 6500 Unit(s) IV Push every 6 hours PRN For aPTT less than 40  heparin   Injectable 3000 Unit(s) IV Push every 6 hours PRN For aPTT between 40 - 57  melatonin 3 milliGRAM(s) Oral at bedtime PRN Insomnia  ondansetron Injectable 4 milliGRAM(s) IV Push every 8 hours PRN Nausea and/or Vomiting            Vital Signs Last 24 Hrs  T(C): 36.1 (19 Nov 2023 20:00), Max: 36.5 (19 Nov 2023 04:41)  T(F): 96.9 (19 Nov 2023 20:00), Max: 97.7 (19 Nov 2023 04:41)  HR: 84 (19 Nov 2023 20:55) (84 - 92)  BP: 180/86 (19 Nov 2023 20:55) (124/77 - 196/89)  BP(mean): 123 (19 Nov 2023 20:55) (91 - 123)  RR: 18 (19 Nov 2023 20:00) (18 - 18)  SpO2: 98% (19 Nov 2023 20:00) (98% - 98%)    Parameters below as of 19 Nov 2023 20:00  Patient On (Oxygen Delivery Method): room air         REVIEW OF SYSTEMS:  CONSTITUTIONAL: No fever, weight loss, or fatigue  CARDIOLOGY: Patient denies chest pain, shortness of breath or syncopal episodes.   RESPIRATORY: denies shortness of breath, wheezeing.   NEUROLOGICAL: NO weakness, no focal deficits to report.  GI: no BRBPR, no N,V,diarrhea.    PSYCHIATRY: normal mood and affect  HEENT: no nasal discharge, no ecchymosis  SKIN: no ecchymosis, no breakdown  MUSCULOSKELETAL: Full range of motion x4.        PHYSICAL EXAM:  · CONSTITUTIONAL:	Well-developed, well nourished      ·RESPIRATORY:   airway patent; breath sounds equal; good air movement; respirations non-labored; clear to auscultation bilaterally  · CARDIOVASCULAR	regular rate and rhythm  no rub  + SE murmur  normal PMI  · EXTREMITIES: No cyanosis, clubbing or edema  · VASCULAR: absent At/PT bilaterally. Feet are warm to touch. Right foot dry eschar to the  3rd, and 4th toes (no purulent drainage, mild malodor, no erythema.         LABS:                        10.0   8.45  )-----------( 200      ( 19 Nov 2023 07:08 )             31.6     11-19    136  |  99  |  94<HH>  ----------------------------<  140<H>  5.2<H>   |  18  |  9.2<HH>    Ca    8.4      19 Nov 2023 07:08  Mg     1.9     11-19    TPro  6.1  /  Alb  3.1<L>  /  TBili  0.3  /  DBili  x   /  AST  9   /  ALT  8   /  AlkPhos  95  11-19        PT/INR - ( 18 Nov 2023 12:08 )   PT: 12.60 sec;   INR: 1.10 ratio         PTT - ( 19 Nov 2023 17:24 )  PTT:54.2 sec    I&O's Summary    BNP  RADIOLOGY & ADDITIONAL STUDIES:    IMPRESSION AND PLAN:    Continue current care as per medicine and Podiatry teams   D/C heparin at 5 AM tomorrow (MR and NP are aware)   ID evaluation and follow up   Will F/U

## 2023-11-19 NOTE — PROGRESS NOTE ADULT - SUBJECTIVE AND OBJECTIVE BOX
ALEXANDRO MONROE 58y Male  MRN#: [id number]     SUBJECTIVE  Patient is a 58y old Male who presents with a chief complaint of Gangrenous Right 3rd toe (19 Nov 2023 10:45)  Today is hospital day 4d, and this morning he is lying in bed without distress.   No acute overnight events.     OBJECTIVE  PAST MEDICAL & SURGICAL HISTORY  CAD (coronary artery disease)    DVT (deep venous thrombosis)    ESRD on dialysis    DM (diabetes mellitus)    Morbid obesity    Peripheral vascular disease    H/O varicose vein stripping    S/P dialysis catheter insertion      ALLERGIES:  Azactam (Hives; Rash)    MEDICATIONS:  STANDING MEDICATIONS  chlorhexidine 2% Cloths 1 Application(s) Topical <User Schedule>  cyanocobalamin 1000 MICROGram(s) Oral daily  dextrose 5%. 1000 milliLiter(s) IV Continuous <Continuous>  dextrose 5%. 1000 milliLiter(s) IV Continuous <Continuous>  dextrose 50% Injectable 25 Gram(s) IV Push once  dextrose 50% Injectable 12.5 Gram(s) IV Push once  dextrose 50% Injectable 25 Gram(s) IV Push once  gabapentin 300 milliGRAM(s) Oral two times a day  glucagon  Injectable 1 milliGRAM(s) IntraMuscular once  heparin  Infusion.  Unit(s)/Hr IV Continuous <Continuous>  influenza   Vaccine 0.5 milliLiter(s) IntraMuscular once  insulin glargine Injectable (LANTUS) 12 Unit(s) SubCutaneous at bedtime  insulin lispro (ADMELOG) corrective regimen sliding scale   SubCutaneous three times a day before meals  insulin lispro (ADMELOG) corrective regimen sliding scale   SubCutaneous at bedtime  insulin lispro Injectable (ADMELOG) 4 Unit(s) SubCutaneous three times a day before meals  lactobacillus acidophilus 1 Tablet(s) Oral once  Nephro-danette 1 Tablet(s) Oral daily  piperacillin/tazobactam IVPB.. 3.375 Gram(s) IV Intermittent every 12 hours    PRN MEDICATIONS  acetaminophen     Tablet .. 650 milliGRAM(s) Oral every 6 hours PRN  dextrose Oral Gel 15 Gram(s) Oral once PRN  heparin   Injectable 3000 Unit(s) IV Push every 6 hours PRN  heparin   Injectable 6500 Unit(s) IV Push every 6 hours PRN  melatonin 3 milliGRAM(s) Oral at bedtime PRN  ondansetron Injectable 4 milliGRAM(s) IV Push every 8 hours PRN    HOME MEDICATIONS  Home Medications:  Aranesp 40 mcg/0.4 mL injectable solution: 0.4 milliliter(s) injectable once a week thursday (05 Nov 2021 21:40)  cyanocobalamin 100 mcg oral tablet: 1 tab(s) orally once a day (05 Nov 2021 21:42)  Eliquis 5 mg oral tablet: 1 tab(s) orally 2 times a day (05 Nov 2021 21:41)  gabapentin 300 mg oral tablet: 1 cap(s) orally every 24 hours (16 Nov 2021 10:04)  midodrine 5 mg oral tablet: 2 tab(s) orally 3 times a day (15 Nov 2023 19:12)  Nephrocaps oral capsule: 1 cap(s) orally once a day (05 Nov 2021 21:41)  NovoLOG 100 units/mL injectable solution: 7 unit(s) injectable 3 times a day (15 Nov 2023 20:01)  Tresiba 100 units/mL subcutaneous solution: 3 unit(s) subcutaneous once a day (at bedtime) (15 Nov 2023 20:02)  Venofer 20 mg/mL intravenous solution: 5 milliliter(s) intravenous Tuesday, Thursday, Sunday with HD (05 Nov 2021 21:38)      VITAL SIGNS: Last 24 Hours  T(C): 36.5 (19 Nov 2023 04:41), Max: 36.5 (19 Nov 2023 04:41)  T(F): 97.7 (19 Nov 2023 04:41), Max: 97.7 (19 Nov 2023 04:41)  HR: 84 (19 Nov 2023 04:41) (84 - 92)  BP: 124/77 (19 Nov 2023 04:41) (124/77 - 193/88)  BP(mean): 91 (19 Nov 2023 04:41) (91 - 126)  RR: 18 (19 Nov 2023 04:41) (18 - 18)  SpO2: 98% (19 Nov 2023 04:41) (97% - 98%)      LABS:                        10.0   8.45  )-----------( 200      ( 19 Nov 2023 07:08 )             31.6     11-19    136  |  99  |  94<HH>  ----------------------------<  140<H>  5.2<H>   |  18  |  9.2<HH>    Ca    8.4      19 Nov 2023 07:08  Mg     1.9     11-19    TPro  6.1  /  Alb  3.1<L>  /  TBili  0.3  /  DBili  x   /  AST  9   /  ALT  8   /  AlkPhos  95  11-19    LIVER FUNCTIONS - ( 19 Nov 2023 07:08 )  Alb: 3.1 g/dL / Pro: 6.1 g/dL / ALK PHOS: 95 U/L / ALT: 8 U/L / AST: 9 U/L / GGT: x           PT/INR - ( 18 Nov 2023 12:08 )   PT: 12.60 sec;   INR: 1.10 ratio         PTT - ( 19 Nov 2023 08:03 )  PTT:65.8 sec  Urinalysis Basic - ( 19 Nov 2023 07:08 )    Color: x / Appearance: x / SG: x / pH: x  Gluc: 140 mg/dL / Ketone: x  / Bili: x / Urobili: x   Blood: x / Protein: x / Nitrite: x   Leuk Esterase: x / RBC: x / WBC x   Sq Epi: x / Non Sq Epi: x / Bacteria: x    CAPILLARY BLOOD GLUCOSE  POCT Blood Glucose.: 165 mg/dL (19 Nov 2023 07:46)      RADIOLOGY:    PHYSICAL EXAM:  GENERAL: NAD, AAO x 4, 58y M  HEAD:  Atraumatic, Normocephalic  EYES: EOMI, conjunctivae clear and sclerae white  NECK: Supple, No JVD  CHEST/LUNG: Clear to auscultation bilaterally; No wheeze; No crackles; No accessory muscles used  HEART: Regular rate and rhythm; No murmurs;   ABDOMEN: Soft, Nontender, Nondistended; Bowel sounds present; No guarding  EXTREMITIES: R foot wrapped with ACE wrap, dressing is C/D/I. 2+ Peripheral Pulses, No cyanosis or edema  NEUROLOGY: non-focal    ADMISSION SUMMARY  Patient is a 58y old Male who presents with a chief complaint of Gangrenous Right 3rd toe (19 Nov 2023 10:45)    ALEXANDRO MONROE 58y Male  MRN#: [id number]     SUBJECTIVE  Patient is a 58y old Male who presents with a chief complaint of Gangrenous Right 3rd toe (19 Nov 2023 10:45)  Today is hospital day 4d, and this morning he is lying in bed without distress.   No acute overnight events.     OBJECTIVE  PAST MEDICAL & SURGICAL HISTORY  CAD (coronary artery disease)    DVT (deep venous thrombosis)    ESRD on dialysis    DM (diabetes mellitus)    Morbid obesity    Peripheral vascular disease    H/O varicose vein stripping    S/P dialysis catheter insertion      ALLERGIES:  Azactam (Hives; Rash)    MEDICATIONS:  STANDING MEDICATIONS  chlorhexidine 2% Cloths 1 Application(s) Topical <User Schedule>  cyanocobalamin 1000 MICROGram(s) Oral daily  dextrose 5%. 1000 milliLiter(s) IV Continuous <Continuous>  dextrose 5%. 1000 milliLiter(s) IV Continuous <Continuous>  dextrose 50% Injectable 25 Gram(s) IV Push once  dextrose 50% Injectable 12.5 Gram(s) IV Push once  dextrose 50% Injectable 25 Gram(s) IV Push once  gabapentin 300 milliGRAM(s) Oral two times a day  glucagon  Injectable 1 milliGRAM(s) IntraMuscular once  heparin  Infusion.  Unit(s)/Hr IV Continuous <Continuous>  influenza   Vaccine 0.5 milliLiter(s) IntraMuscular once  insulin glargine Injectable (LANTUS) 12 Unit(s) SubCutaneous at bedtime  insulin lispro (ADMELOG) corrective regimen sliding scale   SubCutaneous three times a day before meals  insulin lispro (ADMELOG) corrective regimen sliding scale   SubCutaneous at bedtime  insulin lispro Injectable (ADMELOG) 4 Unit(s) SubCutaneous three times a day before meals  lactobacillus acidophilus 1 Tablet(s) Oral once  Nephro-danette 1 Tablet(s) Oral daily  piperacillin/tazobactam IVPB.. 3.375 Gram(s) IV Intermittent every 12 hours    PRN MEDICATIONS  acetaminophen     Tablet .. 650 milliGRAM(s) Oral every 6 hours PRN  dextrose Oral Gel 15 Gram(s) Oral once PRN  heparin   Injectable 3000 Unit(s) IV Push every 6 hours PRN  heparin   Injectable 6500 Unit(s) IV Push every 6 hours PRN  melatonin 3 milliGRAM(s) Oral at bedtime PRN  ondansetron Injectable 4 milliGRAM(s) IV Push every 8 hours PRN    HOME MEDICATIONS  Home Medications:  Aranesp 40 mcg/0.4 mL injectable solution: 0.4 milliliter(s) injectable once a week thursday (05 Nov 2021 21:40)  cyanocobalamin 100 mcg oral tablet: 1 tab(s) orally once a day (05 Nov 2021 21:42)  Eliquis 5 mg oral tablet: 1 tab(s) orally 2 times a day (05 Nov 2021 21:41)  gabapentin 300 mg oral tablet: 1 cap(s) orally every 24 hours (16 Nov 2021 10:04)  midodrine 5 mg oral tablet: 2 tab(s) orally 3 times a day (15 Nov 2023 19:12)  Nephrocaps oral capsule: 1 cap(s) orally once a day (05 Nov 2021 21:41)  NovoLOG 100 units/mL injectable solution: 7 unit(s) injectable 3 times a day (15 Nov 2023 20:01)  Tresiba 100 units/mL subcutaneous solution: 3 unit(s) subcutaneous once a day (at bedtime) (15 Nov 2023 20:02)  Venofer 20 mg/mL intravenous solution: 5 milliliter(s) intravenous Tuesday, Thursday, Sunday with HD (05 Nov 2021 21:38)      VITAL SIGNS: Last 24 Hours  T(C): 36.5 (19 Nov 2023 04:41), Max: 36.5 (19 Nov 2023 04:41)  T(F): 97.7 (19 Nov 2023 04:41), Max: 97.7 (19 Nov 2023 04:41)  HR: 84 (19 Nov 2023 04:41) (84 - 92)  BP: 124/77 (19 Nov 2023 04:41) (124/77 - 193/88)  BP(mean): 91 (19 Nov 2023 04:41) (91 - 126)  RR: 18 (19 Nov 2023 04:41) (18 - 18)  SpO2: 98% (19 Nov 2023 04:41) (97% - 98%)      LABS:                        10.0   8.45  )-----------( 200      ( 19 Nov 2023 07:08 )             31.6     11-19    136  |  99  |  94<HH>  ----------------------------<  140<H>  5.2<H>   |  18  |  9.2<HH>    Ca    8.4      19 Nov 2023 07:08  Mg     1.9     11-19    TPro  6.1  /  Alb  3.1<L>  /  TBili  0.3  /  DBili  x   /  AST  9   /  ALT  8   /  AlkPhos  95  11-19    LIVER FUNCTIONS - ( 19 Nov 2023 07:08 )  Alb: 3.1 g/dL / Pro: 6.1 g/dL / ALK PHOS: 95 U/L / ALT: 8 U/L / AST: 9 U/L / GGT: x           PT/INR - ( 18 Nov 2023 12:08 )   PT: 12.60 sec;   INR: 1.10 ratio         PTT - ( 19 Nov 2023 08:03 )  PTT:65.8 sec  Urinalysis Basic - ( 19 Nov 2023 07:08 )    Color: x / Appearance: x / SG: x / pH: x  Gluc: 140 mg/dL / Ketone: x  / Bili: x / Urobili: x   Blood: x / Protein: x / Nitrite: x   Leuk Esterase: x / RBC: x / WBC x   Sq Epi: x / Non Sq Epi: x / Bacteria: x    CAPILLARY BLOOD GLUCOSE  POCT Blood Glucose.: 165 mg/dL (19 Nov 2023 07:46)      RADIOLOGY:    PHYSICAL EXAM:  GENERAL: NAD, AAO x 4, 58y M  HEAD:  Atraumatic, Normocephalic  EYES:  conjunctivae clear and sclerae white  NECK: Supple, No JVD  CHEST/LUNG: Clear to auscultation bilaterally; No wheeze; No crackles; No accessory muscles used  HEART: Regular rate and rhythm; No murmurs;   ABDOMEN: Soft, Nontender, Nondistended; Bowel sounds present; No guarding  EXTREMITIES: R foot wrapped with ACE wrap, dressing is C/D/I. 2+ Peripheral Pulses, No cyanosis or edema  NEUROLOGY: non-focal    ADMISSION SUMMARY  Patient is a 58y old Male who presents with a chief complaint of Gangrenous Right 3rd toe (19 Nov 2023 10:45)

## 2023-11-19 NOTE — PROGRESS NOTE ADULT - SUBJECTIVE AND OBJECTIVE BOX
Podiatry Progress Note    Subjective:  ALEXANDRO MONROE is a  58y Male.   Seen bedside.   Patient is a 58y old  Male who presents with a chief complaint of Gangrenous Right 3rd toe (19 Nov 2023 11:10)      Past Medical History and Surgical History  PAST MEDICAL & SURGICAL HISTORY:  CAD (coronary artery disease)      DVT (deep venous thrombosis)      ESRD on dialysis      DM (diabetes mellitus)      Morbid obesity      Peripheral vascular disease      H/O varicose vein stripping      S/P dialysis catheter insertion           Objective:  Vital Signs Last 24 Hrs  T(C): 36 (19 Nov 2023 13:25), Max: 36.5 (19 Nov 2023 04:41)  T(F): 96.8 (19 Nov 2023 13:25), Max: 97.7 (19 Nov 2023 04:41)  HR: 92 (19 Nov 2023 13:25) (84 - 92)  BP: 185/81 (19 Nov 2023 13:25) (124/77 - 193/88)  BP(mean): 91 (19 Nov 2023 04:41) (91 - 126)  RR: 18 (19 Nov 2023 13:25) (18 - 18)  SpO2: 98% (19 Nov 2023 04:41) (97% - 98%)    Parameters below as of 19 Nov 2023 04:41  Patient On (Oxygen Delivery Method): room air                            10.0   8.45  )-----------( 200      ( 19 Nov 2023 07:08 )             31.6                 11-19    136  |  99  |  94<HH>  ----------------------------<  140<H>  5.2<H>   |  18  |  9.2<HH>    Ca    8.4      19 Nov 2023 07:08  Mg     1.9     11-19    TPro  6.1  /  Alb  3.1<L>  /  TBili  0.3  /  DBili  x   /  AST  9   /  ALT  8   /  AlkPhos  95  11-19      Physical Exam - Right Lower Extremity Focused:   Derm: Right foot dry eschar to the  3rd, and 4th toes, no purulent drainage, mild malodor, no erythema.   Vascular: DP and PT Pulses Diminished; Foot is Warm to Warm to the touch;  Neuro: Protective Sensation Diminished / Moderately Neuropathic   MSK: Pain On Palpation at Wound Site     Assessment:  Right foot dry gangrene    Plan:  Chart reviewed and Patient evaluated. All Questions and Concerns Addressed and Answered  Local Wound Care; Wound Flushed w/ NS; Wound Packed w/ Betadine Soaked Gauze / DSD / Kerlix; continue q24h  Weight Bearing Status; WBAT w/ Heel Touch w/ Surgical Shoe;   Patient is scheduled for vascular procedure on Tues 11/21  Will follow up with plan for amputation of gangrenous toes right foot after revascularization  Discussed with plan w/patient and family bedside today- demonstrated full understanding of plan                    Podiatry        Podiatry Progress Note    Subjective:  ALEXANDRO MONROE is a  58y Male.   Seen bedside.   Patient is a 58y old  Male who presents with a chief complaint of Gangrenous Right 3rd toe (19 Nov 2023 11:10)      Past Medical History and Surgical History  PAST MEDICAL & SURGICAL HISTORY:  CAD (coronary artery disease)      DVT (deep venous thrombosis)      ESRD on dialysis      DM (diabetes mellitus)      Morbid obesity      Peripheral vascular disease      H/O varicose vein stripping      S/P dialysis catheter insertion           Objective:  Vital Signs Last 24 Hrs  T(C): 36 (19 Nov 2023 13:25), Max: 36.5 (19 Nov 2023 04:41)  T(F): 96.8 (19 Nov 2023 13:25), Max: 97.7 (19 Nov 2023 04:41)  HR: 92 (19 Nov 2023 13:25) (84 - 92)  BP: 185/81 (19 Nov 2023 13:25) (124/77 - 193/88)  BP(mean): 91 (19 Nov 2023 04:41) (91 - 126)  RR: 18 (19 Nov 2023 13:25) (18 - 18)  SpO2: 98% (19 Nov 2023 04:41) (97% - 98%)    Parameters below as of 19 Nov 2023 04:41  Patient On (Oxygen Delivery Method): room air                            10.0   8.45  )-----------( 200      ( 19 Nov 2023 07:08 )             31.6                 11-19    136  |  99  |  94<HH>  ----------------------------<  140<H>  5.2<H>   |  18  |  9.2<HH>    Ca    8.4      19 Nov 2023 07:08  Mg     1.9     11-19    TPro  6.1  /  Alb  3.1<L>  /  TBili  0.3  /  DBili  x   /  AST  9   /  ALT  8   /  AlkPhos  95  11-19      Physical Exam - Right Lower Extremity Focused:   Derm: Right foot dry eschar to the  3rd, and 4th toes, no purulent drainage, mild malodor, no erythema.   Vascular: DP and PT Pulses Diminished; Foot is Warm to Warm to the touch;  Neuro: Protective Sensation Diminished / Moderately Neuropathic   MSK: Pain On Palpation at Wound Site     Assessment:  Right foot dry gangrene    Plan:  Chart reviewed and Patient evaluated. All Questions and Concerns Addressed and Answered  Local Wound Care; Wound Flushed w/ NS; Wound Packed w/ Betadine Soaked Gauze / DSD / Kerlix; continue q24h  Weight Bearing Status; WBAT w/ Heel Touch w/ Surgical Shoe;   Patient is scheduled for vascular procedure on Tues 11/21  Will follow up with plan for amputation of gangrenous toes right foot after revascularization  Discussed with plan w/patient and family bedside today- demonstrated full understanding and agreement to plan                    Podiatry

## 2023-11-19 NOTE — PROGRESS NOTE ADULT - ATTENDING COMMENTS
Seen and examined the patient.  agree with above except.       58 year old male with past medical history of Type 2 diabetes (now treated with Insulin), Bilateral lower extremity DVT (on Eliquis),  ESRD (HD on Tues/Thurs/Sat), CAD, morbid obesity, PVD, who now presents in the ER sent in by his podiatrist Dr. Dudley due to gangrenous Right 3rd and 4th toes.      #Infected Gangrenous Right 3rd and 4th toes with Osteomyelitis  #PVD  - Transfer from [facility] for cath but pt was on eliquis and not NPO so had to be deferred  - Eliquis reduced to 2.5mg BID and will be held starting 11/18 per vascular recs  - Started on heparin infusion, monitor ptt  - S/p HD 11/16  - ID consult --> vanco, zosyn  - Eliquis held 11/18  - Heparin drip to start at 12pm 11/18  - Plan for vascular intervention 11/21  - C/w Abx per ID    #Type 2 diabetes   - Lantus  - Lispro TID   - ISS    #ESRD (HD)   - S/p HD 11/16  - F/u nephro      #CAD  - Not on plavix or statin anymore at home    #MISC  - DVT: heparin  - GI: none  - Diet: renal diet    Dispo: active
Seen and examined the patient.  agree with above except.       58 year old male with past medical history of Type 2 diabetes (now treated with Insulin), Bilateral lower extremity DVT (on Eliquis),  ESRD (HD on Tues/Thurs/Sat), CAD, morbid obesity, PVD, who now presents in the ER sent in by his podiatrist Dr. Dudley due to gangrenous Right 3rd and 4th toes.      #Infected Gangrenous Right 3rd and 4th toes with Osteomyelitis  #PVD  - Transfer from [facility] for cath but pt was on eliquis and not NPO so had to be deferred  - Eliquis reduced to 2.5mg BID and will be held starting 11/18 per vascular recs  - Started on heparin infusion, monitor ptt  - S/p HD 11/16  - ID consult --> vanco, zosyn  - Eliquis held 11/18  - Heparin drip to start at 12pm 11/18  - Plan for vascular intervention 11/21  - C/w Abx per ID    #Type 2 diabetes   - Lantus  - Lispro TID   - ISS    #ESRD (HD)   - S/p HD 11/16  - F/u nephro      #CAD  - Not on plavix or statin anymore at home    #MISC  - DVT: heparin  - GI: none  - Diet: renal diet    Dispo: active

## 2023-11-19 NOTE — PROGRESS NOTE ADULT - ASSESSMENT
ESRD on HD TTS in [place]  R foot gangrene  HTN  Normocytic anemia  - next HD Mon  - BP is mostly elevated; d/c midodrine  - check ph level   - monitor h/h  followed by vascular/podiatry ? OR next week   will follow

## 2023-11-19 NOTE — PROGRESS NOTE ADULT - NSPROGADDITIONALINFOA_GEN_ALL_CORE
The risks and possible complications of the procedure tomorrow were fully discussed with the patient. Alternatives were also discussed

## 2023-11-19 NOTE — PROGRESS NOTE ADULT - SUBJECTIVE AND OBJECTIVE BOX
Nephrology Progress Note    ALEXANDRO MONROE  MRN-[id number]  58y  Male    S:  Patient is seen and examined, events over the last 24h noted.    O:  Allergies:  Azactam (Hives; Rash)    Hospital Medications:   MEDICATIONS  (STANDING):  chlorhexidine 2% Cloths 1 Application(s) Topical <User Schedule>  cyanocobalamin 1000 MICROGram(s) Oral daily  gabapentin 300 milliGRAM(s) Oral two times a day  glucagon  Injectable 1 milliGRAM(s) IntraMuscular once  heparin  Infusion.  Unit(s)/Hr (15 mL/Hr) IV Continuous <Continuous>  influenza   Vaccine 0.5 milliLiter(s) IntraMuscular once  insulin glargine Injectable (LANTUS) 12 Unit(s) SubCutaneous at bedtime  insulin lispro (ADMELOG) corrective regimen sliding scale   SubCutaneous three times a day before meals  insulin lispro (ADMELOG) corrective regimen sliding scale   SubCutaneous at bedtime  insulin lispro Injectable (ADMELOG) 4 Unit(s) SubCutaneous three times a day before meals  lactobacillus acidophilus 1 Tablet(s) Oral once  midodrine. 10 milliGRAM(s) Oral three times a day  Nephro-danette 1 Tablet(s) Oral daily  piperacillin/tazobactam IVPB.. 3.375 Gram(s) IV Intermittent every 12 hours    MEDICATIONS  (PRN):  acetaminophen     Tablet .. 650 milliGRAM(s) Oral every 6 hours PRN Temp greater or equal to 38C (100.4F), Mild Pain (1 - 3)  dextrose Oral Gel 15 Gram(s) Oral once PRN Blood Glucose LESS THAN 70 milliGRAM(s)/deciliter  heparin   Injectable 3000 Unit(s) IV Push every 6 hours PRN For aPTT between 40 - 57  heparin   Injectable 6500 Unit(s) IV Push every 6 hours PRN For aPTT less than 40  melatonin 3 milliGRAM(s) Oral at bedtime PRN Insomnia  ondansetron Injectable 4 milliGRAM(s) IV Push every 8 hours PRN Nausea and/or Vomiting    Home Medications:  Aranesp 40 mcg/0.4 mL injectable solution: 0.4 milliliter(s) injectable once a week thursday (05 Nov 2021 21:40)  cyanocobalamin 100 mcg oral tablet: 1 tab(s) orally once a day (05 Nov 2021 21:42)  Eliquis 5 mg oral tablet: 1 tab(s) orally 2 times a day (05 Nov 2021 21:41)  gabapentin 300 mg oral tablet: 1 cap(s) orally every 24 hours (16 Nov 2021 10:04)  midodrine 5 mg oral tablet: 2 tab(s) orally 3 times a day (15 Nov 2023 19:12)  Nephrocaps oral capsule: 1 cap(s) orally once a day (05 Nov 2021 21:41)  NovoLOG 100 units/mL injectable solution: 7 unit(s) injectable 3 times a day (15 Nov 2023 20:01)  Tresiba 100 units/mL subcutaneous solution: 3 unit(s) subcutaneous once a day (at bedtime) (15 Nov 2023 20:02)  Venofer 20 mg/mL intravenous solution: 5 milliliter(s) intravenous Tuesday, Thursday, Sunday with HD (05 Nov 2021 21:38)      VITALS:  T(F): 97.7 (11-19-23 @ 04:41), Max: 97.7 (11-19-23 @ 04:41)  HR: 84 (11-19-23 @ 04:41)  BP: 124/77 (11-19-23 @ 04:41)  RR: 18 (11-19-23 @ 04:41)  SpO2: 98% (11-19-23 @ 04:41)  Wt(kg): --  I&O's Detail    I&O's Summary        PHYSICAL EXAM:  Gen: NAD  Chest: b/l breath sounds  Abd: soft  Extremities: R foot bandaged  Vascular access: TDC      LABS:      11-19    136  |  99  |  94<HH>  ----------------------------<  140<H>  5.2<H>   |  18  |  9.2<HH>    Ca    8.4      19 Nov 2023 07:08  Mg     1.9     11-19    TPro  6.1  /  Alb  3.1<L>  /  TBili  0.3  /  DBili      /  AST  9   /  ALT  8   /  AlkPhos  95  11-19                            10.0   8.45  )-----------( 200      ( 19 Nov 2023 07:08 )             31.6     Mean Cell Volume: 87.1 fL (11-19-23 @ 07:08)          Culture Results:   No growth at 72 Hours (11-15 @ 15:10)  Culture Results:   No growth at 72 Hours (11-15 @ 15:10)

## 2023-11-19 NOTE — PROGRESS NOTE ADULT - ASSESSMENT
58 year old male with past medical history of Type 2 diabetes (now treated with Insulin), Bilateral lower extremity DVT (on Eliquis),  ESRD (HD on Tues/Thurs/Sat), CAD, morbid obesity, PVD, who now presents in the ER sent in by his podiatrist Dr. Dudley due to gangrenous Right 3rd and 4th toes.      #Infected Gangrenous Right 3rd and 4th toes with Osteomyelitis  #PVD  - [place] eliquis and not NPO so had to be deferred  - Eliquis reduced to 2.5mg BID and will be held starting 11/18 per vascular recs  - Started on heparin infusion, monitor ptt  - S/p HD 11/16  - ID consult --> vanco, zosyn  - Eliquis held 11/18  - Heparin drip to start at 12pm 11/18  - Plan for vascular intervention 11/21 (but possibly Monday 11/20 per cardio note, will NPO after midnight today)  - C/w Abx per ID    #Type 2 diabetes   - Lantus  - Lispro TID   - ISS    #ESRD (HD)   - S/p HD 11/16  - F/u nephro      #CAD  - Not on plavix or statin anymore at home    #MISC  - DVT: heparin  - GI: none  - Diet: renal diet    Dispo: active

## 2023-11-20 LAB
ALBUMIN SERPL ELPH-MCNC: 3.3 G/DL — LOW (ref 3.5–5.2)
ALBUMIN SERPL ELPH-MCNC: 3.3 G/DL — LOW (ref 3.5–5.2)
ALP SERPL-CCNC: 92 U/L — SIGNIFICANT CHANGE UP (ref 30–115)
ALP SERPL-CCNC: 92 U/L — SIGNIFICANT CHANGE UP (ref 30–115)
ALT FLD-CCNC: 12 U/L — SIGNIFICANT CHANGE UP (ref 0–41)
ALT FLD-CCNC: 12 U/L — SIGNIFICANT CHANGE UP (ref 0–41)
ANION GAP SERPL CALC-SCNC: 16 MMOL/L — HIGH (ref 7–14)
ANION GAP SERPL CALC-SCNC: 16 MMOL/L — HIGH (ref 7–14)
ANION GAP SERPL CALC-SCNC: 23 MMOL/L — HIGH (ref 7–14)
ANION GAP SERPL CALC-SCNC: 23 MMOL/L — HIGH (ref 7–14)
APTT BLD: 43.4 SEC — HIGH (ref 27–39.2)
APTT BLD: 43.4 SEC — HIGH (ref 27–39.2)
APTT BLD: 74.9 SEC — CRITICAL HIGH (ref 27–39.2)
APTT BLD: 74.9 SEC — CRITICAL HIGH (ref 27–39.2)
APTT BLD: 94.7 SEC — CRITICAL HIGH (ref 27–39.2)
APTT BLD: 94.7 SEC — CRITICAL HIGH (ref 27–39.2)
AST SERPL-CCNC: 10 U/L — SIGNIFICANT CHANGE UP (ref 0–41)
AST SERPL-CCNC: 10 U/L — SIGNIFICANT CHANGE UP (ref 0–41)
BASOPHILS # BLD AUTO: 0.03 K/UL — SIGNIFICANT CHANGE UP (ref 0–0.2)
BASOPHILS # BLD AUTO: 0.03 K/UL — SIGNIFICANT CHANGE UP (ref 0–0.2)
BASOPHILS NFR BLD AUTO: 0.4 % — SIGNIFICANT CHANGE UP (ref 0–1)
BASOPHILS NFR BLD AUTO: 0.4 % — SIGNIFICANT CHANGE UP (ref 0–1)
BILIRUB SERPL-MCNC: 0.2 MG/DL — SIGNIFICANT CHANGE UP (ref 0.2–1.2)
BILIRUB SERPL-MCNC: 0.2 MG/DL — SIGNIFICANT CHANGE UP (ref 0.2–1.2)
BUN SERPL-MCNC: 106 MG/DL — CRITICAL HIGH (ref 10–20)
BUN SERPL-MCNC: 106 MG/DL — CRITICAL HIGH (ref 10–20)
BUN SERPL-MCNC: 68 MG/DL — CRITICAL HIGH (ref 10–20)
BUN SERPL-MCNC: 68 MG/DL — CRITICAL HIGH (ref 10–20)
CALCIUM SERPL-MCNC: 8.2 MG/DL — LOW (ref 8.4–10.5)
CALCIUM SERPL-MCNC: 8.2 MG/DL — LOW (ref 8.4–10.5)
CALCIUM SERPL-MCNC: 8.7 MG/DL — SIGNIFICANT CHANGE UP (ref 8.4–10.5)
CALCIUM SERPL-MCNC: 8.7 MG/DL — SIGNIFICANT CHANGE UP (ref 8.4–10.5)
CHLORIDE SERPL-SCNC: 99 MMOL/L — SIGNIFICANT CHANGE UP (ref 98–110)
CO2 SERPL-SCNC: 15 MMOL/L — LOW (ref 17–32)
CO2 SERPL-SCNC: 15 MMOL/L — LOW (ref 17–32)
CO2 SERPL-SCNC: 21 MMOL/L — SIGNIFICANT CHANGE UP (ref 17–32)
CO2 SERPL-SCNC: 21 MMOL/L — SIGNIFICANT CHANGE UP (ref 17–32)
CREAT SERPL-MCNC: 10.7 MG/DL — CRITICAL HIGH (ref 0.7–1.5)
CREAT SERPL-MCNC: 10.7 MG/DL — CRITICAL HIGH (ref 0.7–1.5)
CREAT SERPL-MCNC: 8.4 MG/DL — CRITICAL HIGH (ref 0.7–1.5)
CREAT SERPL-MCNC: 8.4 MG/DL — CRITICAL HIGH (ref 0.7–1.5)
CULTURE RESULTS: SIGNIFICANT CHANGE UP
EGFR: 5 ML/MIN/1.73M2 — LOW
EGFR: 5 ML/MIN/1.73M2 — LOW
EGFR: 7 ML/MIN/1.73M2 — LOW
EGFR: 7 ML/MIN/1.73M2 — LOW
EOSINOPHIL # BLD AUTO: 0.44 K/UL — SIGNIFICANT CHANGE UP (ref 0–0.7)
EOSINOPHIL # BLD AUTO: 0.44 K/UL — SIGNIFICANT CHANGE UP (ref 0–0.7)
EOSINOPHIL NFR BLD AUTO: 6.2 % — SIGNIFICANT CHANGE UP (ref 0–8)
EOSINOPHIL NFR BLD AUTO: 6.2 % — SIGNIFICANT CHANGE UP (ref 0–8)
GLUCOSE BLDC GLUCOMTR-MCNC: 136 MG/DL — HIGH (ref 70–99)
GLUCOSE BLDC GLUCOMTR-MCNC: 136 MG/DL — HIGH (ref 70–99)
GLUCOSE BLDC GLUCOMTR-MCNC: 97 MG/DL — SIGNIFICANT CHANGE UP (ref 70–99)
GLUCOSE BLDC GLUCOMTR-MCNC: 97 MG/DL — SIGNIFICANT CHANGE UP (ref 70–99)
GLUCOSE SERPL-MCNC: 136 MG/DL — HIGH (ref 70–99)
GLUCOSE SERPL-MCNC: 136 MG/DL — HIGH (ref 70–99)
GLUCOSE SERPL-MCNC: 144 MG/DL — HIGH (ref 70–99)
GLUCOSE SERPL-MCNC: 144 MG/DL — HIGH (ref 70–99)
HCT VFR BLD CALC: 31 % — LOW (ref 42–52)
HCT VFR BLD CALC: 31 % — LOW (ref 42–52)
HGB BLD-MCNC: 10 G/DL — LOW (ref 14–18)
HGB BLD-MCNC: 10 G/DL — LOW (ref 14–18)
IMM GRANULOCYTES NFR BLD AUTO: 0.3 % — SIGNIFICANT CHANGE UP (ref 0.1–0.3)
IMM GRANULOCYTES NFR BLD AUTO: 0.3 % — SIGNIFICANT CHANGE UP (ref 0.1–0.3)
LYMPHOCYTES # BLD AUTO: 1.77 K/UL — SIGNIFICANT CHANGE UP (ref 1.2–3.4)
LYMPHOCYTES # BLD AUTO: 1.77 K/UL — SIGNIFICANT CHANGE UP (ref 1.2–3.4)
LYMPHOCYTES # BLD AUTO: 25.1 % — SIGNIFICANT CHANGE UP (ref 20.5–51.1)
LYMPHOCYTES # BLD AUTO: 25.1 % — SIGNIFICANT CHANGE UP (ref 20.5–51.1)
MAGNESIUM SERPL-MCNC: 2.2 MG/DL — SIGNIFICANT CHANGE UP (ref 1.8–2.4)
MAGNESIUM SERPL-MCNC: 2.2 MG/DL — SIGNIFICANT CHANGE UP (ref 1.8–2.4)
MCHC RBC-ENTMCNC: 28.2 PG — SIGNIFICANT CHANGE UP (ref 27–31)
MCHC RBC-ENTMCNC: 28.2 PG — SIGNIFICANT CHANGE UP (ref 27–31)
MCHC RBC-ENTMCNC: 32.3 G/DL — SIGNIFICANT CHANGE UP (ref 32–37)
MCHC RBC-ENTMCNC: 32.3 G/DL — SIGNIFICANT CHANGE UP (ref 32–37)
MCV RBC AUTO: 87.3 FL — SIGNIFICANT CHANGE UP (ref 80–94)
MCV RBC AUTO: 87.3 FL — SIGNIFICANT CHANGE UP (ref 80–94)
MONOCYTES # BLD AUTO: 0.52 K/UL — SIGNIFICANT CHANGE UP (ref 0.1–0.6)
MONOCYTES # BLD AUTO: 0.52 K/UL — SIGNIFICANT CHANGE UP (ref 0.1–0.6)
MONOCYTES NFR BLD AUTO: 7.4 % — SIGNIFICANT CHANGE UP (ref 1.7–9.3)
MONOCYTES NFR BLD AUTO: 7.4 % — SIGNIFICANT CHANGE UP (ref 1.7–9.3)
NEUTROPHILS # BLD AUTO: 4.27 K/UL — SIGNIFICANT CHANGE UP (ref 1.4–6.5)
NEUTROPHILS # BLD AUTO: 4.27 K/UL — SIGNIFICANT CHANGE UP (ref 1.4–6.5)
NEUTROPHILS NFR BLD AUTO: 60.6 % — SIGNIFICANT CHANGE UP (ref 42.2–75.2)
NEUTROPHILS NFR BLD AUTO: 60.6 % — SIGNIFICANT CHANGE UP (ref 42.2–75.2)
NRBC # BLD: 0 /100 WBCS — SIGNIFICANT CHANGE UP (ref 0–0)
NRBC # BLD: 0 /100 WBCS — SIGNIFICANT CHANGE UP (ref 0–0)
PLATELET # BLD AUTO: 202 K/UL — SIGNIFICANT CHANGE UP (ref 130–400)
PLATELET # BLD AUTO: 202 K/UL — SIGNIFICANT CHANGE UP (ref 130–400)
PMV BLD: 10.7 FL — HIGH (ref 7.4–10.4)
PMV BLD: 10.7 FL — HIGH (ref 7.4–10.4)
POTASSIUM SERPL-MCNC: 5.1 MMOL/L — HIGH (ref 3.5–5)
POTASSIUM SERPL-MCNC: 5.1 MMOL/L — HIGH (ref 3.5–5)
POTASSIUM SERPL-MCNC: 6 MMOL/L — CRITICAL HIGH (ref 3.5–5)
POTASSIUM SERPL-MCNC: 6 MMOL/L — CRITICAL HIGH (ref 3.5–5)
POTASSIUM SERPL-SCNC: 5.1 MMOL/L — HIGH (ref 3.5–5)
POTASSIUM SERPL-SCNC: 5.1 MMOL/L — HIGH (ref 3.5–5)
POTASSIUM SERPL-SCNC: 6 MMOL/L — CRITICAL HIGH (ref 3.5–5)
POTASSIUM SERPL-SCNC: 6 MMOL/L — CRITICAL HIGH (ref 3.5–5)
PROT SERPL-MCNC: 6.2 G/DL — SIGNIFICANT CHANGE UP (ref 6–8)
PROT SERPL-MCNC: 6.2 G/DL — SIGNIFICANT CHANGE UP (ref 6–8)
RBC # BLD: 3.55 M/UL — LOW (ref 4.7–6.1)
RBC # BLD: 3.55 M/UL — LOW (ref 4.7–6.1)
RBC # FLD: 14.6 % — HIGH (ref 11.5–14.5)
RBC # FLD: 14.6 % — HIGH (ref 11.5–14.5)
SODIUM SERPL-SCNC: 136 MMOL/L — SIGNIFICANT CHANGE UP (ref 135–146)
SODIUM SERPL-SCNC: 136 MMOL/L — SIGNIFICANT CHANGE UP (ref 135–146)
SODIUM SERPL-SCNC: 137 MMOL/L — SIGNIFICANT CHANGE UP (ref 135–146)
SODIUM SERPL-SCNC: 137 MMOL/L — SIGNIFICANT CHANGE UP (ref 135–146)
SPECIMEN SOURCE: SIGNIFICANT CHANGE UP
WBC # BLD: 7.05 K/UL — SIGNIFICANT CHANGE UP (ref 4.8–10.8)
WBC # BLD: 7.05 K/UL — SIGNIFICANT CHANGE UP (ref 4.8–10.8)
WBC # FLD AUTO: 7.05 K/UL — SIGNIFICANT CHANGE UP (ref 4.8–10.8)
WBC # FLD AUTO: 7.05 K/UL — SIGNIFICANT CHANGE UP (ref 4.8–10.8)

## 2023-11-20 RX ORDER — HEPARIN SODIUM 5000 [USP'U]/ML
INJECTION INTRAVENOUS; SUBCUTANEOUS
Qty: 25000 | Refills: 0 | Status: DISCONTINUED | OUTPATIENT
Start: 2023-11-20 | End: 2023-11-25

## 2023-11-20 RX ORDER — CLOPIDOGREL BISULFATE 75 MG/1
75 TABLET, FILM COATED ORAL DAILY
Refills: 0 | Status: DISCONTINUED | OUTPATIENT
Start: 2023-11-21 | End: 2023-11-25

## 2023-11-20 RX ORDER — AMLODIPINE BESYLATE 2.5 MG/1
5 TABLET ORAL DAILY
Refills: 0 | Status: DISCONTINUED | OUTPATIENT
Start: 2023-11-20 | End: 2023-11-23

## 2023-11-20 RX ORDER — SODIUM ZIRCONIUM CYCLOSILICATE 10 G/10G
10 POWDER, FOR SUSPENSION ORAL ONCE
Refills: 0 | Status: COMPLETED | OUTPATIENT
Start: 2023-11-20 | End: 2023-11-21

## 2023-11-20 RX ORDER — SODIUM ZIRCONIUM CYCLOSILICATE 10 G/10G
5 POWDER, FOR SUSPENSION ORAL ONCE
Refills: 0 | Status: DISCONTINUED | OUTPATIENT
Start: 2023-11-20 | End: 2023-11-23

## 2023-11-20 RX ORDER — ASPIRIN/CALCIUM CARB/MAGNESIUM 324 MG
81 TABLET ORAL DAILY
Refills: 0 | Status: DISCONTINUED | OUTPATIENT
Start: 2023-11-21 | End: 2023-11-25

## 2023-11-20 RX ORDER — ASPIRIN/CALCIUM CARB/MAGNESIUM 324 MG
324 TABLET ORAL ONCE
Refills: 0 | Status: COMPLETED | OUTPATIENT
Start: 2023-11-20 | End: 2023-11-20

## 2023-11-20 RX ADMIN — Medication 324 MILLIGRAM(S): at 07:12

## 2023-11-20 RX ADMIN — CHLORHEXIDINE GLUCONATE 1 APPLICATION(S): 213 SOLUTION TOPICAL at 05:15

## 2023-11-20 RX ADMIN — GABAPENTIN 300 MILLIGRAM(S): 400 CAPSULE ORAL at 05:10

## 2023-11-20 RX ADMIN — HEPARIN SODIUM 1500 UNIT(S)/HR: 5000 INJECTION INTRAVENOUS; SUBCUTANEOUS at 22:34

## 2023-11-20 RX ADMIN — PIPERACILLIN AND TAZOBACTAM 25 GRAM(S): 4; .5 INJECTION, POWDER, LYOPHILIZED, FOR SOLUTION INTRAVENOUS at 18:05

## 2023-11-20 RX ADMIN — GABAPENTIN 300 MILLIGRAM(S): 400 CAPSULE ORAL at 18:05

## 2023-11-20 RX ADMIN — INSULIN GLARGINE 12 UNIT(S): 100 INJECTION, SOLUTION SUBCUTANEOUS at 22:34

## 2023-11-20 RX ADMIN — HEPARIN SODIUM 1900 UNIT(S)/HR: 5000 INJECTION INTRAVENOUS; SUBCUTANEOUS at 01:22

## 2023-11-20 RX ADMIN — PIPERACILLIN AND TAZOBACTAM 25 GRAM(S): 4; .5 INJECTION, POWDER, LYOPHILIZED, FOR SOLUTION INTRAVENOUS at 05:10

## 2023-11-20 RX ADMIN — Medication 4 UNIT(S): at 18:04

## 2023-11-20 NOTE — CHART NOTE - NSCHARTNOTEFT_GEN_A_CORE
4T Transfer Note    Transfer from: 3E    Transfer to: (  ) Medicine    (  ) Telemetry    (  ) RCU                               (  ) Palliative    (  ) Stroke Unit    (  ) MICU    ( x ) 4T Cardiology    Accepting Physician: Dr. Leal    Signout given to:     HPI / Floor COURSE:    Mr. Wiggins is a 58-year-old gentleman with a past medical history of Type 2 diabetes (currently managed with Insulin), Bilateral lower extremity DVT (treated with Eliquis), ESRD (undergoing hemodialysis on Tues/Thurs/Sat), CAD, morbid obesity, and PVD, has presented to the Emergency Room at the behest of his podiatrist, Dr. Dudley. The reason for this visit is the presence of gangrenous Right 3rd and 4th toes.    Approximately three weeks ago, the patient noticed ulceration and drainage from his right 3rd and 4th toes. Although he denies experiencing pain in the foot or toes, as well as fever, chills, tremors, nausea, vomiting, diarrhea, chest pain, or shortness of breath, he acknowledges that his podiatrist, Dr. Dudley, has been actively monitoring the condition, performing Betadine dressing changes.    The patient reports a progression in the deterioration of the toes, with a recent development of black discoloration and an associated unpleasant odor. During his visit to the podiatrist on the day of presentation, he was advised to present to the ED for IV abx (Meropenem), a request for an Arterial duplex Doppler, a Cardiovascular consultation with Dr. Go, an Infectious Disease consultation, and a Podiatry consultation. These consultations are in preparation for a potential amputation of the Right 3rd and 4th toes.    Pt was started on IV Zosyn 3.375 gram q12 hours extended infusion as per ID. VA duplex on admission showed High-grade stenosis of the right common femoral artery. Moderate stenosis of the right popliteal artery. Normal arterial flow in the left lower extremity. Pt underwent LE angiogram is s/p successful PTA to right CFA. Pt started on ASA, Plavix and continued on Heparin per Cardiology. Podiatry will follow up with plan for amputation of gangrenous toes right foot after revascularization.     Of note, pt potassium this AM was 6 (non-hemolyzed). He was given Lokelma 5mg x1 and is due for HD today.     For follow up:  - f/u podiatry for OR planning  - monitor K after HD      Vital Signs Last 24 Hrs  T(C): 36.1 (20 Nov 2023 06:51), Max: 36.1 (19 Nov 2023 20:00)  T(F): 97 (20 Nov 2023 06:51), Max: 97 (20 Nov 2023 06:51)  HR: 88 (20 Nov 2023 14:50) (76 - 88)  BP: 107/60 (20 Nov 2023 14:50) (107/60 - 196/89)  BP(mean): 123 (19 Nov 2023 20:55) (123 - 123)  RR: 16 (20 Nov 2023 11:50) (11 - 18)  SpO2: 100% (20 Nov 2023 11:20) (97% - 100%)    Parameters below as of 20 Nov 2023 14:50  Patient On (Oxygen Delivery Method): room air        I&O's Summary    20 Nov 2023 07:01  -  20 Nov 2023 15:16  --------------------------------------------------------  IN: 0 mL / OUT: 2000 mL / NET: -2000 mL        Physical Exam:       LABS:                               10.0   7.05  )-----------( 202      ( 20 Nov 2023 06:31 )             31.0       11-20    137  |  99  |  106<HH>  ----------------------------<  144<H>  6.0<HH>   |  15<L>  |  10.7<HH>    Ca    8.2<L>      20 Nov 2023 06:31  Mg     2.2     11-20    TPro  6.2  /  Alb  3.3<L>  /  TBili  0.2  /  DBili  x   /  AST  10  /  ALT  12  /  AlkPhos  92  11-20      PTT - ( 20 Nov 2023 06:31 )  PTT:43.4 sec      A&P  58 year old male with past medical history of Type 2 diabetes (now treated with Insulin), Bilateral lower extremity DVT (on Eliquis),  ESRD (HD on Tues/Thurs/Sat), CAD, morbid obesity, PVD, who now presents in the ER sent in by his podiatrist Dr. Dudley due to gangrenous Right 3rd and 4th toes.      #Infected Gangrenous Right 3rd and 4th toes with Osteomyelitis  #PVD  #b/l LE DVT  - VA duplex on admission showed High-grade stenosis of the right common femoral artery. Moderate stenosis of the right popliteal artery. Normal arterial flow in the left lower extremity  - Pt underwent LE angiogram is s/p successful PTA to right CFA on 11/20.   - Pt started on ASA, Plavix and continued on Heparin per Cardiology; monitor ptt;  - Eliquis held 11/18; was on Heparin ggt before the LE angiogram and will remain on heparin for now as per cardio; f/u cardio regarding anti-platelet and anti-coagulant regimen  - Podiatry will follow up with plan for amputation of gangrenous toes right foot after revascularization.   - ID consult --> c/w Zosyn    #Type 2 diabetes   - Lantus  - Lispro TID   - ISS    #ESRD (HD)   #Hyperkalemia  - S/p HD 11/16; due for HD today  - K 6.0 This AM (Non-Hemolyzed); Lokelma given, HD today; F/U PM BMP   - F/u nephro      #CAD  - Not on plavix or statin anymore at home    #MISC  - DVT: heparin  - GI: none  - Diet: renal diet    Dispo: active          Pager [phone] | [phone]

## 2023-11-20 NOTE — CHART NOTE - NSCHARTNOTEFT_GEN_A_CORE
INTERVENTIONAL CARDIOLOGY NP:                                               PREOPERATIVE DAY OF PROCEDURE EVALUATION:  I have personally seen and examined the patient.  I agree with the history and physical which I have reviewed and noted any changes below.       History of Present Illness:   Patient is a 58 year old male with past medical history of Type 2 diabetes (now treated with Insulin), Bilateral lower extremity DVT (on Eliquis),  ESRD (HD on Tues/Thurs/Sat), CAD, morbid obesity, PVD, who now presents in the ER sent in by his podiatrist Dr. Dudley due to gangrenous Right 3rd and 4th toes.    Patient states he started having an ulceration and drainage from his right 3rd and 4th toes about 3 weeks ago.  Patient denies complaint of foot or toe pain, fever, chills, tremors, N/V/D, CP or SOB.  Reports his podiatrist has been monitoring them and doing Betadine dressing changes.  Patient reports then toe have been worsening and then turned black and started to smell.  When he saw his podiatrist today he sent him with a list for Ivabx with Meropenum, Arterial duplex.   Impression:  High-grade stenosis of the right common femoral artery. Moderate stenosis   of the right popliteal artery.  Normal arterial flow in theleft lower extremity.  peripheral angiogram today, Eliquis last dose 11/18 am      Bleeding Risk Score:   4.4%  No prehydration d/t ESRD on HD       (Signed electronically by __________)  11-20-23 @ 06:46 INTERVENTIONAL CARDIOLOGY NP:                                               PREOPERATIVE DAY OF PROCEDURE EVALUATION:  I have personally seen and examined the patient.  I agree with the history and physical which I have reviewed and noted any changes below.       History of Present Illness:   Patient is a 58 year old male with past medical history of Type 2 diabetes (now treated with Insulin), Bilateral lower extremity DVT (on Eliquis),  ESRD (HD on Tues/Thurs/Sat), CAD, morbid obesity, PVD, who now presents in the ER sent in by his podiatrist Dr. Dudley due to gangrenous Right 3rd and 4th toes.    Patient states he started having an ulceration and drainage from his right 3rd and 4th toes about 3 weeks ago.  Patient denies complaint of foot or toe pain, fever, chills, tremors, N/V/D, CP or SOB.  Reports his podiatrist has been monitoring them and doing Betadine dressing changes.  Patient reports then toe have been worsening and then turned black and started to smell.  When he saw his podiatrist today he sent him with a list for Ivabx with Meropenum, Arterial duplex.   Impression:  High-grade stenosis of the right common femoral artery. Moderate stenosis   of the right popliteal artery.  Normal arterial flow in theleft lower extremity.    peripheral angiogram today, Eliquis last dose 11/18 am  Bleeding Risk Score:   4.4%  No prehydration d/t ESRD on HD  b/l PT/DP doppler  R foot dressing C/D/I       (Signed electronically by __________)  11-20-23 @ 06:46

## 2023-11-20 NOTE — PROGRESS NOTE ADULT - SUBJECTIVE AND OBJECTIVE BOX
Podiatry Progress Note    Subjective:  ALEXANDRO MONROE is a  58y Male.   Seen bedside.   Patient is a 58y old  Male who presents with a chief complaint of Gangrenous Right 3rd toe (20 Nov 2023 10:12)      Past Medical History and Surgical History  PAST MEDICAL & SURGICAL HISTORY:  CAD (coronary artery disease)      DVT (deep venous thrombosis)      ESRD on dialysis      DM (diabetes mellitus)      Morbid obesity      Peripheral vascular disease      H/O varicose vein stripping      S/P dialysis catheter insertion           Objective:  Vital Signs Last 24 Hrs  T(C): 36.3 (20 Nov 2023 16:07), Max: 36.3 (20 Nov 2023 16:07)  T(F): 97.4 (20 Nov 2023 16:07), Max: 97.4 (20 Nov 2023 16:07)  HR: 93 (20 Nov 2023 16:07) (76 - 93)  BP: 139/70 (20 Nov 2023 16:07) (107/60 - 196/89)  BP(mean): 99 (20 Nov 2023 16:07) (99 - 123)  RR: 18 (20 Nov 2023 16:07) (11 - 18)  SpO2: 98% (20 Nov 2023 16:07) (97% - 100%)    Parameters below as of 20 Nov 2023 14:50  Patient On (Oxygen Delivery Method): room air                            10.0   7.05  )-----------( 202      ( 20 Nov 2023 06:31 )             31.0                 11-20    137  |  99  |  106<HH>  ----------------------------<  144<H>  6.0<HH>   |  15<L>  |  10.7<HH>    Ca    8.2<L>      20 Nov 2023 06:31  Mg     2.2     11-20    TPro  6.2  /  Alb  3.3<L>  /  TBili  0.2  /  DBili  x   /  AST  10  /  ALT  12  /  AlkPhos  92  11-20        Physical Exam - Right Lower Extremity Focused:   Derm: Right foot dry eschar to the  3rd, and 4th toes, no purulent drainage, mild malodor, no erythema.   Vascular: DP and PT Pulses Diminished; Foot is Warm to Warm to the touch;  Neuro: Protective Sensation Diminished / Moderately Neuropathic   MSK: Pain On Palpation at Wound Site     Assessment:  Right foot dry gangrene    Plan:  Chart reviewed and Patient evaluated. All Questions and Concerns Addressed and Answered  Local Wound Care; Wound Flushed w/ NS; Wound Packed w/ Betadine Soaked Gauze / DSD / Kerlix; continue q24h  Weight Bearing Status; WBAT w/ Heel Touch w/ Surgical Shoe;   Plan for surgical intervention for toe amputation pending  Foot appears improved today - vascular intervention performed  Will continue to follow, final plan pending attending eval today  Discussed plan w/Dr. Gallegos    Podiatry   [phone]

## 2023-11-20 NOTE — PRE-OP CHECKLIST - IDENTIFICATION BAND VERIFIED
Physical Therapy Daily Progress Note    Patient: Everton Barros   : 1976  Diagnosis/ICD-10 Code:  Acute pain of left shoulder [M25.512]  Referring practitioner: Chari Payton MD  Date of Initial Visit: Type: THERAPY  Noted: 2021  Today's Date: 10/25/2021  Patient seen for 11 sessions         Everton Barros reports: a little more pain/discomfort in shoulder and front of arm today. Seems like it gets better and then it gets tight again.    Subjective     Objective   See Exercise, Manual, and Modality Logs for complete treatment.       Assessment/Plan  Subjectively, pt reports no increase of pain or discomfort with interventions performed today. Performed well with continued shoulder mobility interventions with shortened session due to pt late arrival. Continues to demonstrate increased stiffness in L shoulder, especially in rotational planes.  Continues to benefit from verbal/tactile cues to ensure proper form and technique for exercise performance.     Progress per Plan of Care           Manual Therapy:    15     mins  68887;  Therapeutic Exercise:    15     mins  31419;     Neuromuscular Raul:        mins  54568;    Therapeutic Activity:          mins  36666;     Gait Training:           mins  78632;     Ultrasound:          mins  13832;    Electrical Stimulation:         mins  63028 ( );  Dry Needling          mins self-pay    Timed Treatment:   30   mins   Total Treatment:     40   mins    Callie Finnegan PTA  Physical Therapist Assistant [phone]     done

## 2023-11-20 NOTE — CHART NOTE - NSCHARTNOTEFT_GEN_A_CORE
INDICATION: nonhealing DFU                              PHYSICIAN: Abbi  ASSISTANT/FELLOW: Bubba Garner    ACCESS: Ultrasound Guided  left femoral artery access 6fr    VASCULAR CLOSURE DEVICE (if applicable): manual hold    ANGIOGRAM:  Selective Abdominal Aorta, Right Iliac, Right SFA, Left Iliac, Left SFA, Right and Left Lower Extremity DSA angiography     DETAILED PROCEDURE SUMMARY:  After obtaining informed consent, the patient was brought to the catheterization suite in a post- absorptive and non-sedated state. After this, a timeout was performed and I administered moderate sedation throughout this 45-minute procedure. An independent trained observer pushed medications at my direction, and monitored the patient’s level of consciousness and physiological status throughout. The patient was prepped and draped in the usual sterile manner. 1% lidocaine was used for local anesthesia and the patient was sedated as per endovascular lab protocol. Access was obtained in the  Femoral Artery using the modified Seldinger technique 5/6/7 Setswana Sheath was placed in the artery under fluoroscopy and ultrasound guidance which was utilized for assessment of arterial patency and actual real-time visualization of needle passage to the arterial lumen. The sheath was exchanged for 6 Fr 45 cm destination sheath prior to the interventional part of the procedure.      CONTRAST: 70 ml        PERIPHERAL INTERVENTION SUMMARY: s/p successful PTA to right CFA      COMPLICATIONS:        RECOMMENDATIONS:  IV fluids: none, patient on HD  Antiplatelets: ASA, and Plavix  Aggressive risk factor modification  Disposition: Monitor

## 2023-11-20 NOTE — PROGRESS NOTE ADULT - ASSESSMENT
ESRD on HD TTS in [place]  R foot gangrene  HTN  Normocytic anemia  - HD today 3h opti 160 2k bath/ low K diet   - check ph level   - monitor h/h  followed by vascular/podiatry / sp PTA right CFA    will follow

## 2023-11-20 NOTE — PROGRESS NOTE ADULT - SUBJECTIVE AND OBJECTIVE BOX
Nephrology progress note    THIS IS AN INCOMPLETE NOTE . FULL NOTE TO FOLLOW SHORTLY    Patient is seen and examined, events over the last 24 h noted .    Allergies:  Azactam (Hives; Rash)    Hospital Medications:   MEDICATIONS  (STANDING):  chlorhexidine 2% Cloths 1 Application(s) Topical <User Schedule>  cyanocobalamin 1000 MICROGram(s) Oral daily  dextrose 5%. 1000 milliLiter(s) (50 mL/Hr) IV Continuous <Continuous>  dextrose 5%. 1000 milliLiter(s) (100 mL/Hr) IV Continuous <Continuous>  dextrose 50% Injectable 12.5 Gram(s) IV Push once  dextrose 50% Injectable 25 Gram(s) IV Push once  dextrose 50% Injectable 25 Gram(s) IV Push once  gabapentin 300 milliGRAM(s) Oral two times a day  glucagon  Injectable 1 milliGRAM(s) IntraMuscular once  heparin  Infusion.  Unit(s)/Hr (15 mL/Hr) IV Continuous <Continuous>  influenza   Vaccine 0.5 milliLiter(s) IntraMuscular once  insulin glargine Injectable (LANTUS) 12 Unit(s) SubCutaneous at bedtime  insulin lispro (ADMELOG) corrective regimen sliding scale   SubCutaneous three times a day before meals  insulin lispro (ADMELOG) corrective regimen sliding scale   SubCutaneous at bedtime  insulin lispro Injectable (ADMELOG) 4 Unit(s) SubCutaneous three times a day before meals  lactobacillus acidophilus 1 Tablet(s) Oral once  Nephro-danette 1 Tablet(s) Oral daily  piperacillin/tazobactam IVPB.. 3.375 Gram(s) IV Intermittent every 12 hours        VITALS:  T(F): 97 (11-20-23 @ 06:51), Max: 97 (11-20-23 @ 06:51)  HR: 80 (11-20-23 @ 09:25)  BP: 165/88 (11-20-23 @ 09:25)  RR: 15 (11-20-23 @ 09:25)  SpO2: 98% (11-20-23 @ 09:25)  Wt(kg): --        PHYSICAL EXAM:  Constitutional: NAD  HEENT: anicteric sclera, oropharynx clear, MMM  Neck: No JVD  Respiratory: CTAB, no wheezes, rales or rhonchi  Cardiovascular: S1, S2, RRR  Gastrointestinal: BS+, soft, NT/ND  Extremities: No cyanosis or clubbing. No peripheral edema  :  No callahan.   Skin: No rashes    LABS:  11-20    137  |  99  |  106<HH>  ----------------------------<  144<H>  6.0<HH>   |  15<L>  |  10.7<HH>    Ca    8.2<L>      20 Nov 2023 06:31  Mg     2.2     11-20    TPro  6.2  /  Alb  3.3<L>  /  TBili  0.2  /  DBili      /  AST  10  /  ALT  12  /  AlkPhos  92  11-20                          10.0   7.05  )-----------( 202      ( 20 Nov 2023 06:31 )             31.0       Urine Studies:  Urinalysis Basic - ( 20 Nov 2023 06:31 )    Color:  / Appearance:  / SG:  / pH:   Gluc: 144 mg/dL / Ketone:   / Bili:  / Urobili:    Blood:  / Protein:  / Nitrite:    Leuk Esterase:  / RBC:  / WBC    Sq Epi:  / Non Sq Epi:  / Bacteria:             HBsAb Nonreact      [11-13-21 @ 09:30]  HBsAg Nonreact      [11-08-21 @ 15:44]  HCV 0.12, Nonreact      [11-08-21 @ 15:44]        RADIOLOGY & ADDITIONAL STUDIES:   Nephrology progress note    Patient is seen and examined, events over the last 24 h noted .  lying in bed   sp angio      Allergies:  Azactam (Hives; Rash)    Hospital Medications:   MEDICATIONS  (STANDING):    cyanocobalamin 1000 MICROGram(s) Oral daily  gabapentin 300 milliGRAM(s) Oral two times a day  glucagon  Injectable 1 milliGRAM(s) IntraMuscular once  heparin  Infusion.  Unit(s)/Hr (15 mL/Hr) IV Continuous <Continuous>  influenza   Vaccine 0.5 milliLiter(s) IntraMuscular once  insulin glargine Injectable (LANTUS) 12 Unit(s) SubCutaneous at bedtime  insulin lispro (ADMELOG) corrective regimen sliding scale   SubCutaneous three times a day before meals  insulin lispro (ADMELOG) corrective regimen sliding scale   SubCutaneous at bedtime  insulin lispro Injectable (ADMELOG) 4 Unit(s) SubCutaneous three times a day before meals  lactobacillus acidophilus 1 Tablet(s) Oral once  Nephro-danette 1 Tablet(s) Oral daily  piperacillin/tazobactam IVPB.. 3.375 Gram(s) IV Intermittent every 12 hours        VITALS:  T(F): 97 (11-20-23 @ 06:51), Max: 97 (11-20-23 @ 06:51)  HR: 80 (11-20-23 @ 09:25)  BP: 165/88 (11-20-23 @ 09:25)  RR: 15 (11-20-23 @ 09:25)  SpO2: 98% (11-20-23 @ 09:25)          PHYSICAL EXAM:  Constitutional: NAD  HEENT: anicteric sclera, oropharynx clear, MMM  Neck: No JVD  Respiratory: CTAB, no wheezes, rales or rhonchi  Cardiovascular: S1, S2, RRR  Gastrointestinal: BS+, soft, NT/ND  Extremities: No cyanosis or clubbing. No peripheral edema  :  No callahan.   Skin: No rashes    LABS:  11-20    137  |  99  |  106<HH>  ----------------------------<  144<H>  6.0<HH>   |  15<L>  |  10.7<HH>    Ca    8.2<L>      20 Nov 2023 06:31  Mg     2.2     11-20    TPro  6.2  /  Alb  3.3<L>  /  TBili  0.2  /  DBili      /  AST  10  /  ALT  12  /  AlkPhos  92  11-20                          10.0   7.05  )-----------( 202      ( 20 Nov 2023 06:31 )             31.0       Urine Studies:  Urinalysis Basic - ( 20 Nov 2023 06:31 )    Color:  / Appearance:  / SG:  / pH:   Gluc: 144 mg/dL / Ketone:   / Bili:  / Urobili:    Blood:  / Protein:  / Nitrite:    Leuk Esterase:  / RBC:  / WBC    Sq Epi:  / Non Sq Epi:  / Bacteria:             HBsAb Nonreact      [11-13-21 @ 09:30]  HBsAg Nonreact      [11-08-21 @ 15:44]  HCV 0.12, Nonreact      [11-08-21 @ 15:44]        RADIOLOGY & ADDITIONAL STUDIES:

## 2023-11-21 LAB
ALBUMIN SERPL ELPH-MCNC: 3.5 G/DL — SIGNIFICANT CHANGE UP (ref 3.5–5.2)
ALBUMIN SERPL ELPH-MCNC: 3.5 G/DL — SIGNIFICANT CHANGE UP (ref 3.5–5.2)
ALP SERPL-CCNC: 98 U/L — SIGNIFICANT CHANGE UP (ref 30–115)
ALP SERPL-CCNC: 98 U/L — SIGNIFICANT CHANGE UP (ref 30–115)
ALT FLD-CCNC: 20 U/L — SIGNIFICANT CHANGE UP (ref 0–41)
ALT FLD-CCNC: 20 U/L — SIGNIFICANT CHANGE UP (ref 0–41)
ANION GAP SERPL CALC-SCNC: 18 MMOL/L — HIGH (ref 7–14)
ANION GAP SERPL CALC-SCNC: 18 MMOL/L — HIGH (ref 7–14)
APTT BLD: 47.3 SEC — HIGH (ref 27–39.2)
APTT BLD: 47.3 SEC — HIGH (ref 27–39.2)
APTT BLD: 60.1 SEC — HIGH (ref 27–39.2)
APTT BLD: 60.1 SEC — HIGH (ref 27–39.2)
APTT BLD: 65 SEC — HIGH (ref 27–39.2)
APTT BLD: 65 SEC — HIGH (ref 27–39.2)
AST SERPL-CCNC: 16 U/L — SIGNIFICANT CHANGE UP (ref 0–41)
AST SERPL-CCNC: 16 U/L — SIGNIFICANT CHANGE UP (ref 0–41)
BASOPHILS # BLD AUTO: 0.04 K/UL — SIGNIFICANT CHANGE UP (ref 0–0.2)
BASOPHILS # BLD AUTO: 0.04 K/UL — SIGNIFICANT CHANGE UP (ref 0–0.2)
BASOPHILS NFR BLD AUTO: 0.5 % — SIGNIFICANT CHANGE UP (ref 0–1)
BASOPHILS NFR BLD AUTO: 0.5 % — SIGNIFICANT CHANGE UP (ref 0–1)
BILIRUB SERPL-MCNC: 0.2 MG/DL — SIGNIFICANT CHANGE UP (ref 0.2–1.2)
BILIRUB SERPL-MCNC: 0.2 MG/DL — SIGNIFICANT CHANGE UP (ref 0.2–1.2)
BUN SERPL-MCNC: 69 MG/DL — CRITICAL HIGH (ref 10–20)
BUN SERPL-MCNC: 69 MG/DL — CRITICAL HIGH (ref 10–20)
CALCIUM SERPL-MCNC: 9.2 MG/DL — SIGNIFICANT CHANGE UP (ref 8.4–10.4)
CALCIUM SERPL-MCNC: 9.2 MG/DL — SIGNIFICANT CHANGE UP (ref 8.4–10.4)
CHLORIDE SERPL-SCNC: 99 MMOL/L — SIGNIFICANT CHANGE UP (ref 98–110)
CHLORIDE SERPL-SCNC: 99 MMOL/L — SIGNIFICANT CHANGE UP (ref 98–110)
CO2 SERPL-SCNC: 20 MMOL/L — SIGNIFICANT CHANGE UP (ref 17–32)
CO2 SERPL-SCNC: 20 MMOL/L — SIGNIFICANT CHANGE UP (ref 17–32)
CREAT SERPL-MCNC: 9.4 MG/DL — CRITICAL HIGH (ref 0.7–1.5)
CREAT SERPL-MCNC: 9.4 MG/DL — CRITICAL HIGH (ref 0.7–1.5)
EGFR: 6 ML/MIN/1.73M2 — LOW
EGFR: 6 ML/MIN/1.73M2 — LOW
EOSINOPHIL # BLD AUTO: 0.42 K/UL — SIGNIFICANT CHANGE UP (ref 0–0.7)
EOSINOPHIL # BLD AUTO: 0.42 K/UL — SIGNIFICANT CHANGE UP (ref 0–0.7)
EOSINOPHIL NFR BLD AUTO: 5.8 % — SIGNIFICANT CHANGE UP (ref 0–8)
EOSINOPHIL NFR BLD AUTO: 5.8 % — SIGNIFICANT CHANGE UP (ref 0–8)
GLUCOSE BLDC GLUCOMTR-MCNC: 108 MG/DL — HIGH (ref 70–99)
GLUCOSE BLDC GLUCOMTR-MCNC: 108 MG/DL — HIGH (ref 70–99)
GLUCOSE BLDC GLUCOMTR-MCNC: 110 MG/DL — HIGH (ref 70–99)
GLUCOSE BLDC GLUCOMTR-MCNC: 110 MG/DL — HIGH (ref 70–99)
GLUCOSE BLDC GLUCOMTR-MCNC: 134 MG/DL — HIGH (ref 70–99)
GLUCOSE BLDC GLUCOMTR-MCNC: 134 MG/DL — HIGH (ref 70–99)
GLUCOSE BLDC GLUCOMTR-MCNC: 169 MG/DL — HIGH (ref 70–99)
GLUCOSE BLDC GLUCOMTR-MCNC: 169 MG/DL — HIGH (ref 70–99)
GLUCOSE SERPL-MCNC: 103 MG/DL — HIGH (ref 70–99)
GLUCOSE SERPL-MCNC: 103 MG/DL — HIGH (ref 70–99)
HCT VFR BLD CALC: 31.1 % — LOW (ref 42–52)
HCT VFR BLD CALC: 31.1 % — LOW (ref 42–52)
HGB BLD-MCNC: 9.8 G/DL — LOW (ref 14–18)
HGB BLD-MCNC: 9.8 G/DL — LOW (ref 14–18)
IMM GRANULOCYTES NFR BLD AUTO: 0.3 % — SIGNIFICANT CHANGE UP (ref 0.1–0.3)
IMM GRANULOCYTES NFR BLD AUTO: 0.3 % — SIGNIFICANT CHANGE UP (ref 0.1–0.3)
LYMPHOCYTES # BLD AUTO: 1.42 K/UL — SIGNIFICANT CHANGE UP (ref 1.2–3.4)
LYMPHOCYTES # BLD AUTO: 1.42 K/UL — SIGNIFICANT CHANGE UP (ref 1.2–3.4)
LYMPHOCYTES # BLD AUTO: 19.5 % — LOW (ref 20.5–51.1)
LYMPHOCYTES # BLD AUTO: 19.5 % — LOW (ref 20.5–51.1)
MAGNESIUM SERPL-MCNC: 2 MG/DL — SIGNIFICANT CHANGE UP (ref 1.8–2.4)
MAGNESIUM SERPL-MCNC: 2 MG/DL — SIGNIFICANT CHANGE UP (ref 1.8–2.4)
MCHC RBC-ENTMCNC: 27.9 PG — SIGNIFICANT CHANGE UP (ref 27–31)
MCHC RBC-ENTMCNC: 27.9 PG — SIGNIFICANT CHANGE UP (ref 27–31)
MCHC RBC-ENTMCNC: 31.5 G/DL — LOW (ref 32–37)
MCHC RBC-ENTMCNC: 31.5 G/DL — LOW (ref 32–37)
MCV RBC AUTO: 88.6 FL — SIGNIFICANT CHANGE UP (ref 80–94)
MCV RBC AUTO: 88.6 FL — SIGNIFICANT CHANGE UP (ref 80–94)
MONOCYTES # BLD AUTO: 0.44 K/UL — SIGNIFICANT CHANGE UP (ref 0.1–0.6)
MONOCYTES # BLD AUTO: 0.44 K/UL — SIGNIFICANT CHANGE UP (ref 0.1–0.6)
MONOCYTES NFR BLD AUTO: 6 % — SIGNIFICANT CHANGE UP (ref 1.7–9.3)
MONOCYTES NFR BLD AUTO: 6 % — SIGNIFICANT CHANGE UP (ref 1.7–9.3)
MRSA PCR RESULT.: NEGATIVE — SIGNIFICANT CHANGE UP
MRSA PCR RESULT.: NEGATIVE — SIGNIFICANT CHANGE UP
NEUTROPHILS # BLD AUTO: 4.96 K/UL — SIGNIFICANT CHANGE UP (ref 1.4–6.5)
NEUTROPHILS # BLD AUTO: 4.96 K/UL — SIGNIFICANT CHANGE UP (ref 1.4–6.5)
NEUTROPHILS NFR BLD AUTO: 67.9 % — SIGNIFICANT CHANGE UP (ref 42.2–75.2)
NEUTROPHILS NFR BLD AUTO: 67.9 % — SIGNIFICANT CHANGE UP (ref 42.2–75.2)
NRBC # BLD: 0 /100 WBCS — SIGNIFICANT CHANGE UP (ref 0–0)
NRBC # BLD: 0 /100 WBCS — SIGNIFICANT CHANGE UP (ref 0–0)
PLATELET # BLD AUTO: 199 K/UL — SIGNIFICANT CHANGE UP (ref 130–400)
PLATELET # BLD AUTO: 199 K/UL — SIGNIFICANT CHANGE UP (ref 130–400)
PMV BLD: 10.3 FL — SIGNIFICANT CHANGE UP (ref 7.4–10.4)
PMV BLD: 10.3 FL — SIGNIFICANT CHANGE UP (ref 7.4–10.4)
POTASSIUM SERPL-MCNC: 5.4 MMOL/L — HIGH (ref 3.5–5)
POTASSIUM SERPL-MCNC: 5.4 MMOL/L — HIGH (ref 3.5–5)
POTASSIUM SERPL-SCNC: 5.4 MMOL/L — HIGH (ref 3.5–5)
POTASSIUM SERPL-SCNC: 5.4 MMOL/L — HIGH (ref 3.5–5)
PROT SERPL-MCNC: 6.2 G/DL — SIGNIFICANT CHANGE UP (ref 6–8)
PROT SERPL-MCNC: 6.2 G/DL — SIGNIFICANT CHANGE UP (ref 6–8)
RBC # BLD: 3.51 M/UL — LOW (ref 4.7–6.1)
RBC # BLD: 3.51 M/UL — LOW (ref 4.7–6.1)
RBC # FLD: 15.2 % — HIGH (ref 11.5–14.5)
RBC # FLD: 15.2 % — HIGH (ref 11.5–14.5)
SODIUM SERPL-SCNC: 137 MMOL/L — SIGNIFICANT CHANGE UP (ref 135–146)
SODIUM SERPL-SCNC: 137 MMOL/L — SIGNIFICANT CHANGE UP (ref 135–146)
WBC # BLD: 7.3 K/UL — SIGNIFICANT CHANGE UP (ref 4.8–10.8)
WBC # BLD: 7.3 K/UL — SIGNIFICANT CHANGE UP (ref 4.8–10.8)
WBC # FLD AUTO: 7.3 K/UL — SIGNIFICANT CHANGE UP (ref 4.8–10.8)
WBC # FLD AUTO: 7.3 K/UL — SIGNIFICANT CHANGE UP (ref 4.8–10.8)

## 2023-11-21 PROCEDURE — 99233 SBSQ HOSP IP/OBS HIGH 50: CPT

## 2023-11-21 RX ORDER — HEPARIN SODIUM 5000 [USP'U]/ML
6500 INJECTION INTRAVENOUS; SUBCUTANEOUS ONCE
Refills: 0 | Status: DISCONTINUED | OUTPATIENT
Start: 2023-11-21 | End: 2023-11-21

## 2023-11-21 RX ORDER — SODIUM ZIRCONIUM CYCLOSILICATE 10 G/10G
5 POWDER, FOR SUSPENSION ORAL ONCE
Refills: 0 | Status: COMPLETED | OUTPATIENT
Start: 2023-11-21 | End: 2023-11-21

## 2023-11-21 RX ORDER — HEPARIN SODIUM 5000 [USP'U]/ML
3000 INJECTION INTRAVENOUS; SUBCUTANEOUS EVERY 6 HOURS
Refills: 0 | Status: DISCONTINUED | OUTPATIENT
Start: 2023-11-21 | End: 2023-11-21

## 2023-11-21 RX ORDER — HEPARIN SODIUM 5000 [USP'U]/ML
6500 INJECTION INTRAVENOUS; SUBCUTANEOUS EVERY 6 HOURS
Refills: 0 | Status: DISCONTINUED | OUTPATIENT
Start: 2023-11-21 | End: 2023-11-21

## 2023-11-21 RX ORDER — HEPARIN SODIUM 5000 [USP'U]/ML
INJECTION INTRAVENOUS; SUBCUTANEOUS
Qty: 25000 | Refills: 0 | Status: DISCONTINUED | OUTPATIENT
Start: 2023-11-21 | End: 2023-11-21

## 2023-11-21 RX ORDER — ATORVASTATIN CALCIUM 80 MG/1
40 TABLET, FILM COATED ORAL AT BEDTIME
Refills: 0 | Status: DISCONTINUED | OUTPATIENT
Start: 2023-11-21 | End: 2023-11-25

## 2023-11-21 RX ADMIN — CHLORHEXIDINE GLUCONATE 1 APPLICATION(S): 213 SOLUTION TOPICAL at 06:09

## 2023-11-21 RX ADMIN — SODIUM ZIRCONIUM CYCLOSILICATE 5 GRAM(S): 10 POWDER, FOR SUSPENSION ORAL at 08:58

## 2023-11-21 RX ADMIN — PIPERACILLIN AND TAZOBACTAM 25 GRAM(S): 4; .5 INJECTION, POWDER, LYOPHILIZED, FOR SOLUTION INTRAVENOUS at 17:28

## 2023-11-21 RX ADMIN — AMLODIPINE BESYLATE 5 MILLIGRAM(S): 2.5 TABLET ORAL at 06:09

## 2023-11-21 RX ADMIN — Medication 2: at 12:43

## 2023-11-21 RX ADMIN — GABAPENTIN 300 MILLIGRAM(S): 400 CAPSULE ORAL at 06:08

## 2023-11-21 RX ADMIN — Medication 1 TABLET(S): at 11:10

## 2023-11-21 RX ADMIN — Medication 81 MILLIGRAM(S): at 11:10

## 2023-11-21 RX ADMIN — ATORVASTATIN CALCIUM 40 MILLIGRAM(S): 80 TABLET, FILM COATED ORAL at 21:35

## 2023-11-21 RX ADMIN — Medication 4 UNIT(S): at 12:42

## 2023-11-21 RX ADMIN — HEPARIN SODIUM 1700 UNIT(S)/HR: 5000 INJECTION INTRAVENOUS; SUBCUTANEOUS at 22:36

## 2023-11-21 RX ADMIN — HEPARIN SODIUM 1700 UNIT(S)/HR: 5000 INJECTION INTRAVENOUS; SUBCUTANEOUS at 08:58

## 2023-11-21 RX ADMIN — Medication 4 UNIT(S): at 17:28

## 2023-11-21 RX ADMIN — INSULIN GLARGINE 12 UNIT(S): 100 INJECTION, SOLUTION SUBCUTANEOUS at 21:35

## 2023-11-21 RX ADMIN — CLOPIDOGREL BISULFATE 75 MILLIGRAM(S): 75 TABLET, FILM COATED ORAL at 11:10

## 2023-11-21 RX ADMIN — HEPARIN SODIUM 3000 UNIT(S): 5000 INJECTION INTRAVENOUS; SUBCUTANEOUS at 07:41

## 2023-11-21 RX ADMIN — PREGABALIN 1000 MICROGRAM(S): 225 CAPSULE ORAL at 11:10

## 2023-11-21 RX ADMIN — HEPARIN SODIUM 1700 UNIT(S)/HR: 5000 INJECTION INTRAVENOUS; SUBCUTANEOUS at 07:24

## 2023-11-21 RX ADMIN — HEPARIN SODIUM 1700 UNIT(S)/HR: 5000 INJECTION INTRAVENOUS; SUBCUTANEOUS at 15:59

## 2023-11-21 RX ADMIN — GABAPENTIN 300 MILLIGRAM(S): 400 CAPSULE ORAL at 17:28

## 2023-11-21 RX ADMIN — PIPERACILLIN AND TAZOBACTAM 25 GRAM(S): 4; .5 INJECTION, POWDER, LYOPHILIZED, FOR SOLUTION INTRAVENOUS at 06:08

## 2023-11-21 RX ADMIN — SODIUM ZIRCONIUM CYCLOSILICATE 10 GRAM(S): 10 POWDER, FOR SUSPENSION ORAL at 00:40

## 2023-11-21 RX ADMIN — Medication 4 UNIT(S): at 08:57

## 2023-11-21 NOTE — PROGRESS NOTE ADULT - ASSESSMENT
ESRD on HD TTS in [place]  R foot gangrene  HTN  Normocytic anemia  - next HD tomorrow  - renal diet  - check ph level   - monitor h/h / start PARISA wkly  - BP controlled  - dose abx foe iHD, on zosyn  followed by vascular/podiatry / sp PTA right CFA    will follow

## 2023-11-21 NOTE — PROGRESS NOTE ADULT - TIME BILLING
chart review, bedside evaluation, discussion of care with the patient chart review, bedside evaluation, discussion of care with the patient  Agree with above

## 2023-11-21 NOTE — DISCHARGE NOTE NURSING/CASE MANAGEMENT/SOCIAL WORK - NSDCPEFALRISK_GEN_ALL_CORE
For information on Fall & Injury Prevention, visit: [email or web address] OR  [email or web address] OR  https://www.cdc.gov/steadi/patient.html

## 2023-11-21 NOTE — PROGRESS NOTE ADULT - SUBJECTIVE AND OBJECTIVE BOX
Nephrology Progress Note    ALEXANDRO MONROE  MRN-[id number]  58y  Male    S:  Patient is seen and examined, events over the last 24h noted.    O:  Allergies:  Azactam (Hives; Rash)    Hospital Medications:   MEDICATIONS  (STANDING):  amLODIPine   Tablet 5 milliGRAM(s) Oral daily  aspirin enteric coated 81 milliGRAM(s) Oral daily  chlorhexidine 2% Cloths 1 Application(s) Topical <User Schedule>  clopidogrel Tablet 75 milliGRAM(s) Oral daily  cyanocobalamin 1000 MICROGram(s) Oral daily  dextrose 5%. 1000 milliLiter(s) (50 mL/Hr) IV Continuous <Continuous>  dextrose 5%. 1000 milliLiter(s) (100 mL/Hr) IV Continuous <Continuous>  dextrose 50% Injectable 25 Gram(s) IV Push once  dextrose 50% Injectable 12.5 Gram(s) IV Push once  dextrose 50% Injectable 25 Gram(s) IV Push once  gabapentin 300 milliGRAM(s) Oral two times a day  glucagon  Injectable 1 milliGRAM(s) IntraMuscular once  heparin  Infusion.  Unit(s)/Hr (15 mL/Hr) IV Continuous <Continuous>  influenza   Vaccine 0.5 milliLiter(s) IntraMuscular once  insulin glargine Injectable (LANTUS) 12 Unit(s) SubCutaneous at bedtime  insulin lispro (ADMELOG) corrective regimen sliding scale   SubCutaneous three times a day before meals  insulin lispro (ADMELOG) corrective regimen sliding scale   SubCutaneous at bedtime  insulin lispro Injectable (ADMELOG) 4 Unit(s) SubCutaneous three times a day before meals  lactobacillus acidophilus 1 Tablet(s) Oral once  Nephro-danette 1 Tablet(s) Oral daily  piperacillin/tazobactam IVPB.. 3.375 Gram(s) IV Intermittent every 12 hours  sodium zirconium cyclosilicate 5 Gram(s) Oral once    MEDICATIONS  (PRN):  acetaminophen     Tablet .. 650 milliGRAM(s) Oral every 6 hours PRN Temp greater or equal to 38C (100.4F), Mild Pain (1 - 3)  dextrose Oral Gel 15 Gram(s) Oral once PRN Blood Glucose LESS THAN 70 milliGRAM(s)/deciliter  heparin   Injectable 6500 Unit(s) IV Push every 6 hours PRN For aPTT less than 40  heparin   Injectable 3000 Unit(s) IV Push every 6 hours PRN For aPTT between 40 - 57  melatonin 3 milliGRAM(s) Oral at bedtime PRN Insomnia  ondansetron Injectable 4 milliGRAM(s) IV Push every 8 hours PRN Nausea and/or Vomiting    Home Medications:  Aranesp 40 mcg/0.4 mL injectable solution: 0.4 milliliter(s) injectable once a week thursday (2021 21:40)  cyanocobalamin 100 mcg oral tablet: 1 tab(s) orally once a day (2021 21:42)  Eliquis 5 mg oral tablet: 1 tab(s) orally 2 times a day (2021 21:41)  gabapentin 300 mg oral tablet: 1 cap(s) orally every 24 hours (2021 10:04)  midodrine 5 mg oral tablet: 2 tab(s) orally 3 times a day (15 Nov 2023 19:12)  Nephrocaps oral capsule: 1 cap(s) orally once a day (2021 21:41)  NovoLOG 100 units/mL injectable solution: 7 unit(s) injectable 3 times a day (15 Nov 2023 20:01)  Tresiba 100 units/mL subcutaneous solution: 3 unit(s) subcutaneous once a day (at bedtime) (15 Nov 2023 20:02)  Venofer 20 mg/mL intravenous solution: 5 milliliter(s) intravenous Tuesday, Thursday,  with HD (2021 21:38)      VITALS:  Daily Height in cm: 162.56 (2023 16:07)    Daily Weight in k (2023 16:07)  T(F): 98.2 (23 @ 07:42), Max: 98.8 (23 @ 23:40)  HR: 101 (23 @ 07:42)  BP: 139/65 (23 @ 07:42)  RR: 18 (23 @ 04:08)  SpO2: 97% (23 @ 04:08)  Wt(kg): --  I&O's Detail    2023 07:01  -  2023 07:00  --------------------------------------------------------  IN:  Total IN: 0 mL    OUT:    Other (mL): 2000 mL  Total OUT: 2000 mL    Total NET: -2000 mL        I&O's Summary    2023 07:01  -  2023 07:00  --------------------------------------------------------  IN: 0 mL / OUT: 2000 mL / NET: -2000 mL      Height (cm): 162.6 ( @ 16:07)    PHYSICAL EXAM:  Gen: NAD  Chest: b/l breath sounds  Abd: soft  Extremities: no edema  Vascular access: TDC      LABS:          137  |  99  |  69<HH>  ----------------------------<  103<H>  5.4<H>   |  20  |  9.4<HH>    Ca    9.2      2023 05:50  Mg     2.0         TPro  6.2  /  Alb  3.5  /  TBili  0.2  /  DBili      /  AST  16  /  ALT  20  /  AlkPhos  98      eGFR: 6 mL/min/1.73m2 (23 @ 05:50)  eGFR: 7 mL/min/1.73m2 (23 @ 21:35)                            9.8    7.30  )-----------( 199      ( 2023 05:50 )             31.1     Mean Cell Volume: 88.6 fL (23 @ 05:50)     Nephrology Progress Note    ALEXANDRO MONROE  MRN-[id number]  58y  Male    S:  Patient is seen and examined, events over the last 24h noted.    O:  Allergies:  Azactam (Hives; Rash)    Hospital Medications:   MEDICATIONS  (STANDING):  amLODIPine   Tablet 5 milliGRAM(s) Oral daily  aspirin enteric coated 81 milliGRAM(s) Oral daily  chlorhexidine 2% Cloths 1 Application(s) Topical <User Schedule>  clopidogrel Tablet 75 milliGRAM(s) Oral daily  cyanocobalamin 1000 MICROGram(s) Oral daily  gabapentin 300 milliGRAM(s) Oral two times a day  glucagon  Injectable 1 milliGRAM(s) IntraMuscular once  heparin  Infusion.  Unit(s)/Hr (15 mL/Hr) IV Continuous <Continuous>  influenza   Vaccine 0.5 milliLiter(s) IntraMuscular once  insulin glargine Injectable (LANTUS) 12 Unit(s) SubCutaneous at bedtime  insulin lispro (ADMELOG) corrective regimen sliding scale   SubCutaneous three times a day before meals  insulin lispro (ADMELOG) corrective regimen sliding scale   SubCutaneous at bedtime  insulin lispro Injectable (ADMELOG) 4 Unit(s) SubCutaneous three times a day before meals  lactobacillus acidophilus 1 Tablet(s) Oral once  Nephro-danette 1 Tablet(s) Oral daily  piperacillin/tazobactam IVPB.. 3.375 Gram(s) IV Intermittent every 12 hours    MEDICATIONS  (PRN):  acetaminophen     Tablet .. 650 milliGRAM(s) Oral every 6 hours PRN Temp greater or equal to 38C (100.4F), Mild Pain (1 - 3)  dextrose Oral Gel 15 Gram(s) Oral once PRN Blood Glucose LESS THAN 70 milliGRAM(s)/deciliter  heparin   Injectable 6500 Unit(s) IV Push every 6 hours PRN For aPTT less than 40  heparin   Injectable 3000 Unit(s) IV Push every 6 hours PRN For aPTT between 40 - 57  melatonin 3 milliGRAM(s) Oral at bedtime PRN Insomnia  ondansetron Injectable 4 milliGRAM(s) IV Push every 8 hours PRN Nausea and/or Vomiting    Home Medications:  Aranesp 40 mcg/0.4 mL injectable solution: 0.4 milliliter(s) injectable once a week thursday (2021 21:40)  cyanocobalamin 100 mcg oral tablet: 1 tab(s) orally once a day (2021 21:42)  Eliquis 5 mg oral tablet: 1 tab(s) orally 2 times a day (2021 21:41)  gabapentin 300 mg oral tablet: 1 cap(s) orally every 24 hours (2021 10:04)  midodrine 5 mg oral tablet: 2 tab(s) orally 3 times a day (15 Nov 2023 19:12)  Nephrocaps oral capsule: 1 cap(s) orally once a day (2021 21:41)  NovoLOG 100 units/mL injectable solution: 7 unit(s) injectable 3 times a day (15 Nov 2023 20:01)  Tresiba 100 units/mL subcutaneous solution: 3 unit(s) subcutaneous once a day (at bedtime) (15 Nov 2023 20:02)  Venofer 20 mg/mL intravenous solution: 5 milliliter(s) intravenous Tuesday, Thursday,  with HD (2021 21:38)      VITALS:  Daily Height in cm: 162.56 (2023 16:07)    Daily Weight in k (2023 16:07)  T(F): 98.2 (23 @ 07:42), Max: 98.8 (23 @ 23:40)  HR: 101 (23 @ 07:42)  BP: 139/65 (23 @ 07:42)  RR: 18 (23 @ 04:08)  SpO2: 97% (23 @ 04:08)  Wt(kg): --  I&O's Detail    2023 07:  -  2023 07:00  --------------------------------------------------------  IN:  Total IN: 0 mL    OUT:    Other (mL): 2000 mL  Total OUT: 2000 mL    Total NET: -2000 mL        I&O's Summary    2023 07:01  -  2023 07:00  --------------------------------------------------------  IN: 0 mL / OUT: 2000 mL / NET: -2000 mL      Height (cm): 162.6 ( @ 16:07)    PHYSICAL EXAM:  Gen: NAD  Chest: b/l breath sounds  Abd: soft  Vascular access: TDC      LABS:          137  |  99  |  69<HH>  ----------------------------<  103<H>  5.4<H>   |  20  |  9.4<HH>    Ca    9.2      2023 05:50  Mg     2.0         TPro  6.2  /  Alb  3.5  /  TBili  0.2  /  DBili      /  AST  16  /  ALT  20  /  AlkPhos  98                            9.8    7.30  )-----------( 199      ( 2023 05:50 )             31.1     Mean Cell Volume: 88.6 fL (23 @ 05:50)

## 2023-11-21 NOTE — PROGRESS NOTE ADULT - NS ATTEND AMEND GEN_ALL_CORE FT
In brief, 58-year-old man, hx of ESRD on HD, PVD, LE DVT on AC, chart history of CAD (recalls PCI >3 years ago), and obesity who was referred by podiatry for gangrenous RLE 3rd and 4th digits. Now s/p PTA of the R CFA 11/20 and awaiting surgical intervention for his toe later this week.    Today feels well post revascularization.  Continue DAPT, resume heparin for hx of DVT.  Podiatry following - awaiting recommendations for possible intervention later this week.  Start statin.  Check TTE (none in the EMR).

## 2023-11-21 NOTE — PROGRESS NOTE ADULT - SUBJECTIVE AND OBJECTIVE BOX
SUBJ:      MEDICATIONS  (STANDING):  amLODIPine   Tablet 5 milliGRAM(s) Oral daily  aspirin enteric coated 81 milliGRAM(s) Oral daily  chlorhexidine 2% Cloths 1 Application(s) Topical <User Schedule>  clopidogrel Tablet 75 milliGRAM(s) Oral daily  cyanocobalamin 1000 MICROGram(s) Oral daily  dextrose 5%. 1000 milliLiter(s) (50 mL/Hr) IV Continuous <Continuous>  dextrose 5%. 1000 milliLiter(s) (100 mL/Hr) IV Continuous <Continuous>  dextrose 50% Injectable 25 Gram(s) IV Push once  dextrose 50% Injectable 12.5 Gram(s) IV Push once  dextrose 50% Injectable 25 Gram(s) IV Push once  gabapentin 300 milliGRAM(s) Oral two times a day  glucagon  Injectable 1 milliGRAM(s) IntraMuscular once  heparin  Infusion.  Unit(s)/Hr (15 mL/Hr) IV Continuous <Continuous>  influenza   Vaccine 0.5 milliLiter(s) IntraMuscular once  insulin glargine Injectable (LANTUS) 12 Unit(s) SubCutaneous at bedtime  insulin lispro (ADMELOG) corrective regimen sliding scale   SubCutaneous three times a day before meals  insulin lispro (ADMELOG) corrective regimen sliding scale   SubCutaneous at bedtime  insulin lispro Injectable (ADMELOG) 4 Unit(s) SubCutaneous three times a day before meals  lactobacillus acidophilus 1 Tablet(s) Oral once  Nephro-danette 1 Tablet(s) Oral daily  piperacillin/tazobactam IVPB.. 3.375 Gram(s) IV Intermittent every 12 hours  sodium zirconium cyclosilicate 5 Gram(s) Oral once    MEDICATIONS  (PRN):  acetaminophen     Tablet .. 650 milliGRAM(s) Oral every 6 hours PRN Temp greater or equal to 38C (100.4F), Mild Pain (1 - 3)  dextrose Oral Gel 15 Gram(s) Oral once PRN Blood Glucose LESS THAN 70 milliGRAM(s)/deciliter  heparin   Injectable 3000 Unit(s) IV Push every 6 hours PRN For aPTT between 40 - 57  heparin   Injectable 6500 Unit(s) IV Push every 6 hours PRN For aPTT less than 40  melatonin 3 milliGRAM(s) Oral at bedtime PRN Insomnia  ondansetron Injectable 4 milliGRAM(s) IV Push every 8 hours PRN Nausea and/or Vomiting            Vital Signs Last 24 Hrs  T(C): 37.1 (20 Nov 2023 23:40), Max: 37.1 (20 Nov 2023 23:40)  T(F): 98.8 (20 Nov 2023 23:40), Max: 98.8 (20 Nov 2023 23:40)  HR: 98 (21 Nov 2023 04:08) (76 - 98)  BP: 137/63 (21 Nov 2023 04:08) (107/60 - 182/99)  BP(mean): 90 (21 Nov 2023 04:08) (90 - 103)  RR: 18 (21 Nov 2023 04:08) (11 - 18)  SpO2: 97% (21 Nov 2023 04:08) (97% - 100%)    Parameters below as of 21 Nov 2023 04:08  Patient On (Oxygen Delivery Method): room air         REVIEW OF SYSTEMS:  CONSTITUTIONAL: No fever, weight loss, or fatigue  CARDIOLOGY: PAtient denies chest pain, shortness of breath or syncopal episodes.   RESPIRATORY: denies shortness of breath, wheezeing.   NEUROLOGICAL: NO weakness, no focal deficits to report.  GI: no BRBPR, no N,V,diarrhea.    PSYCHIATRY: normal mood and affect  HEENT: no nasal discharge, no ecchymosis  SKIN: no ecchymosis, no breakdown  MUSCULOSKELETAL: Full range of motion x4.        PHYSICAL EXAM:  · CONSTITUTIONAL:	Well-developed, well nourished    BMI-  ·RESPIRATORY:   airway patent; breath sounds equal; good air movement; respirations non-labored; clear to auscultation bilaterally; no chest wall tenderness; no intercostal retractions; no rales,rhonchi or wheeze  · CARDIOVASCULAR	regular rate and rhythm  no rub  no murmur  normal PMI  · EXTREMITIES: No cyanosis, clubbing or edema  · VASCULAR: 	Equal and normal pulses (carotid, femoral, dorsalis pedis)  	  TELEMETRY:    ECG:    TTE:    LABS:                        9.8    7.30  )-----------( 199      ( 21 Nov 2023 05:50 )             31.1     11-21    137  |  99  |  x   ----------------------------<  103<H>  5.4<H>   |  20  |  x     Ca    9.2      21 Nov 2023 05:50  Mg     2.0     11-21    TPro  6.2  /  Alb  3.5  /  TBili  0.2  /  DBili  x   /  AST  16  /  ALT  20  /  AlkPhos  98  11-21        PTT - ( 21 Nov 2023 05:50 )  PTT:47.3 sec    I&O's Summary    20 Nov 2023 07:01  -  21 Nov 2023 07:00  --------------------------------------------------------  IN: 0 mL / OUT: 2000 mL / NET: -2000 mL      BNP  RADIOLOGY & ADDITIONAL STUDIES:    IMPRESSION AND PLAN:         Denies any chest pain, SOB or palpitations       MEDICATIONS  (STANDING):  amLODIPine   Tablet 5 milliGRAM(s) Oral daily  aspirin enteric coated 81 milliGRAM(s) Oral daily  chlorhexidine 2% Cloths 1 Application(s) Topical <User Schedule>  clopidogrel Tablet 75 milliGRAM(s) Oral daily  cyanocobalamin 1000 MICROGram(s) Oral daily  dextrose 5%. 1000 milliLiter(s) (50 mL/Hr) IV Continuous <Continuous>  dextrose 5%. 1000 milliLiter(s) (100 mL/Hr) IV Continuous <Continuous>  dextrose 50% Injectable 25 Gram(s) IV Push once  dextrose 50% Injectable 12.5 Gram(s) IV Push once  dextrose 50% Injectable 25 Gram(s) IV Push once  gabapentin 300 milliGRAM(s) Oral two times a day  glucagon  Injectable 1 milliGRAM(s) IntraMuscular once  heparin  Infusion.  Unit(s)/Hr (15 mL/Hr) IV Continuous <Continuous>  influenza   Vaccine 0.5 milliLiter(s) IntraMuscular once  insulin glargine Injectable (LANTUS) 12 Unit(s) SubCutaneous at bedtime  insulin lispro (ADMELOG) corrective regimen sliding scale   SubCutaneous three times a day before meals  insulin lispro (ADMELOG) corrective regimen sliding scale   SubCutaneous at bedtime  insulin lispro Injectable (ADMELOG) 4 Unit(s) SubCutaneous three times a day before meals  lactobacillus acidophilus 1 Tablet(s) Oral once  Nephro-danette 1 Tablet(s) Oral daily  piperacillin/tazobactam IVPB.. 3.375 Gram(s) IV Intermittent every 12 hours  sodium zirconium cyclosilicate 5 Gram(s) Oral once    MEDICATIONS  (PRN):  acetaminophen     Tablet .. 650 milliGRAM(s) Oral every 6 hours PRN Temp greater or equal to 38C (100.4F), Mild Pain (1 - 3)  dextrose Oral Gel 15 Gram(s) Oral once PRN Blood Glucose LESS THAN 70 milliGRAM(s)/deciliter  heparin   Injectable 3000 Unit(s) IV Push every 6 hours PRN For aPTT between 40 - 57  heparin   Injectable 6500 Unit(s) IV Push every 6 hours PRN For aPTT less than 40  melatonin 3 milliGRAM(s) Oral at bedtime PRN Insomnia  ondansetron Injectable 4 milliGRAM(s) IV Push every 8 hours PRN Nausea and/or Vomiting            Vital Signs Last 24 Hrs  T(C): 37.1 (20 Nov 2023 23:40), Max: 37.1 (20 Nov 2023 23:40)  T(F): 98.8 (20 Nov 2023 23:40), Max: 98.8 (20 Nov 2023 23:40)  HR: 98 (21 Nov 2023 04:08) (76 - 98)  BP: 137/63 (21 Nov 2023 04:08) (107/60 - 182/99)  BP(mean): 90 (21 Nov 2023 04:08) (90 - 103)  RR: 18 (21 Nov 2023 04:08) (11 - 18)  SpO2: 97% (21 Nov 2023 04:08) (97% - 100%)    Parameters below as of 21 Nov 2023 04:08  Patient On (Oxygen Delivery Method): room air         REVIEW OF SYSTEMS:  CONSTITUTIONAL: No fever, weight loss, or fatigue  CARDIOLOGY: PAtient denies chest pain, shortness of breath or syncopal episodes.   RESPIRATORY: denies shortness of breath, wheezeing.   NEUROLOGICAL: NO weakness, no focal deficits to report.  GI: no BRBPR, no N,V,diarrhea.    PSYCHIATRY: normal mood and affect  HEENT: no nasal discharge, no ecchymosis  SKIN: no ecchymosis, no breakdown  MUSCULOSKELETAL: Full range of motion x4.        PHYSICAL EXAM:  · CONSTITUTIONAL:	Well-developed, well nourished     ·RESPIRATORY:   airway patent; breath sounds equal; good air movement; respirations non-labored; clear to auscultation bilaterally   · CARDIOVASCULAR	regular rate and rhythm  no rub  + SE murmur  normal PMI  · EXTREMITIES: No cyanosis, clubbing or edema  · VASCULAR: absent right PT       TTE:    LABS:                        9.8    7.30  )-----------( 199      ( 21 Nov 2023 05:50 )             31.1     11-21    137  |  99  |  x   ----------------------------<  103<H>  5.4<H>   |  20  |  x     Ca    9.2      21 Nov 2023 05:50  Mg     2.0     11-21    TPro  6.2  /  Alb  3.5  /  TBili  0.2  /  DBili  x   /  AST  16  /  ALT  20  /  AlkPhos  98  11-21        PTT - ( 21 Nov 2023 05:50 )  PTT:47.3 sec    I&O's Summary    20 Nov 2023 07:01  -  21 Nov 2023 07:00  --------------------------------------------------------  IN: 0 mL / OUT: 2000 mL / NET: -2000 mL      BNP  RADIOLOGY & ADDITIONAL STUDIES:    IMPRESSION AND PLAN:        Continue current care as per Podiatry team  DAPT  Eliquis 2.5 mg bid   Will F/U

## 2023-11-21 NOTE — PROGRESS NOTE ADULT - SUBJECTIVE AND OBJECTIVE BOX
Denies any chest pain, SOB or palpitations   Laying comfortably in bed reading his newspaper  S/P LE angiography and PTA to right CFA          MEDICATIONS  (STANDING):  amLODIPine   Tablet 5 milliGRAM(s) Oral daily  aspirin enteric coated 81 milliGRAM(s) Oral daily  atorvastatin 40 milliGRAM(s) Oral at bedtime  chlorhexidine 2% Cloths 1 Application(s) Topical <User Schedule>  clopidogrel Tablet 75 milliGRAM(s) Oral daily  cyanocobalamin 1000 MICROGram(s) Oral daily  dextrose 5%. 1000 milliLiter(s) (50 mL/Hr) IV Continuous <Continuous>  dextrose 5%. 1000 milliLiter(s) (100 mL/Hr) IV Continuous <Continuous>  dextrose 50% Injectable 25 Gram(s) IV Push once  dextrose 50% Injectable 12.5 Gram(s) IV Push once  dextrose 50% Injectable 25 Gram(s) IV Push once  gabapentin 300 milliGRAM(s) Oral two times a day  glucagon  Injectable 1 milliGRAM(s) IntraMuscular once  heparin  Infusion.  Unit(s)/Hr (15 mL/Hr) IV Continuous <Continuous>  influenza   Vaccine 0.5 milliLiter(s) IntraMuscular once  insulin glargine Injectable (LANTUS) 12 Unit(s) SubCutaneous at bedtime  insulin lispro (ADMELOG) corrective regimen sliding scale   SubCutaneous three times a day before meals  insulin lispro (ADMELOG) corrective regimen sliding scale   SubCutaneous at bedtime  insulin lispro Injectable (ADMELOG) 4 Unit(s) SubCutaneous three times a day before meals  lactobacillus acidophilus 1 Tablet(s) Oral once  Nephro-danette 1 Tablet(s) Oral daily  piperacillin/tazobactam IVPB.. 3.375 Gram(s) IV Intermittent every 12 hours  sodium zirconium cyclosilicate 5 Gram(s) Oral once    MEDICATIONS  (PRN):  acetaminophen     Tablet .. 650 milliGRAM(s) Oral every 6 hours PRN Temp greater or equal to 38C (100.4F), Mild Pain (1 - 3)  dextrose Oral Gel 15 Gram(s) Oral once PRN Blood Glucose LESS THAN 70 milliGRAM(s)/deciliter  heparin   Injectable 3000 Unit(s) IV Push every 6 hours PRN For aPTT between 40 - 57  heparin   Injectable 6500 Unit(s) IV Push every 6 hours PRN For aPTT less than 40  melatonin 3 milliGRAM(s) Oral at bedtime PRN Insomnia  ondansetron Injectable 4 milliGRAM(s) IV Push every 8 hours PRN Nausea and/or Vomiting            Vital Signs Last 24 Hrs  T(C): 36.8 (21 Nov 2023 20:45), Max: 36.8 (21 Nov 2023 07:42)  T(F): 98.3 (21 Nov 2023 20:45), Max: 98.3 (21 Nov 2023 20:45)  HR: 95 (21 Nov 2023 20:45) (90 - 101)  BP: 170/80 (21 Nov 2023 20:45) (137/63 - 170/80)  BP(mean): 115 (21 Nov 2023 20:45) (90 - 115)  RR: 18 (21 Nov 2023 20:45) (18 - 18)  SpO2: 99% (21 Nov 2023 20:45) (97% - 99%)    Parameters below as of 21 Nov 2023 20:45  Patient On (Oxygen Delivery Method): room air         REVIEW OF SYSTEMS:  CONSTITUTIONAL: No fever, weight loss, or fatigue  CARDIOLOGY: Patient denies chest pain, shortness of breath or syncopal episodes.   RESPIRATORY: denies shortness of breath, wheezeing.   NEUROLOGICAL: NO weakness, no focal deficits to report.  GI: no BRBPR, no N,V,diarrhea.    PSYCHIATRY: normal mood and affect  HEENT: no nasal discharge, no ecchymosis  SKIN: no ecchymosis, no breakdown  MUSCULOSKELETAL: Full range of motion x4.        PHYSICAL EXAM:  · CONSTITUTIONAL:	Well-developed, well nourished     ·RESPIRATORY:   airway patent; breath sounds equal; good air movement; respirations non-labored; clear to auscultation bilaterally   · CARDIOVASCULAR	regular rate and rhythm  no rub  + SE murmur  normal PMI  · EXTREMITIES: + edema bilaterally. Right foot covered   · VASCULAR: absent A/PT. No evidence of pseudo or hematoma at puncture site. Legs warm. + stasis dermatitis    	    TELEMETRY: Sinus rhythm       LABS:                        9.8    7.30  )-----------( 199      ( 21 Nov 2023 05:50 )             31.1     11-21    137  |  99  |  69<HH>  ----------------------------<  103<H>  5.4<H>   |  20  |  9.4<HH>    Ca    9.2      21 Nov 2023 05:50  Mg     2.0     11-21    TPro  6.2  /  Alb  3.5  /  TBili  0.2  /  DBili  x   /  AST  16  /  ALT  20  /  AlkPhos  98  11-21        PTT - ( 21 Nov 2023 21:32 )  PTT:60.1 sec    I&O's Summary    20 Nov 2023 07:01  -  21 Nov 2023 07:00  --------------------------------------------------------  IN: 0 mL / OUT: 2000 mL / NET: -2000 mL    21 Nov 2023 07:01  -  21 Nov 2023 23:47  --------------------------------------------------------  IN: 542 mL / OUT: 0 mL / NET: 542 mL      BNP  RADIOLOGY & ADDITIONAL STUDIES:    IMPRESSION AND PLAN:      Continue current care   DAPT  Eliquis 2.5 mg bid   Renal follow up   Podiatry possible intervention   Will F/U

## 2023-11-21 NOTE — PROGRESS NOTE ADULT - ASSESSMENT
A&P  58 year old male with past medical history of Type 2 diabetes (now treated with Insulin), Bilateral lower extremity DVT (on Eliquis),  ESRD (HD on Tues/Thurs/Sat), CAD, morbid obesity, PVD, who now presents in the ER sent in by his podiatrist Dr. Dudley due to gangrenous Right 3rd and 4th toes. Patient underwent PTA to the right CFA on 11/20. Patient waiting for surgical intervention for toe amputation later this week.     Plan  #Infected Gangrenous Right 3rd and 4th toes with Osteomyelitis  #PVD  #b/l LE DVT  - VA duplex on admission showed High-grade stenosis of the right common femoral artery. Moderate stenosis of the right popliteal artery. Normal arterial flow in the left lower extremity  - Pt underwent LE angiogram is s/p successful PTA to right CFA on 11/20.   - Pt started on ASA, Plavix and continued on Heparin per Cardiology; monitor ptt;  - Eliquis held 11/18; was on Heparin ggt before the LE angiogram and will remain on heparin for now as per cardio; f/u cardio regarding anti-platelet and anti-coagulant regimen  -  Amputation of gangrenous toes right foot after revascularization.   - ID consult --> c/w Zosyn    #Type 2 diabetes   - Lantus  - Lispro TID   - ISS    #ESRD (HD)   #Hyperkalemia  - S/p HD 11/16; due for HD today  - K 5.4 This AM (Non-Hemolyzed); Lokelma 5mg givenx1, HD tomorrow; F/U PM BMP   - F/u nephro      #CAD  - Not on plavix or statin anymore at home    #MISC  - DVT: heparin  - GI: none  - Diet: renal diet    Dispo: active       A&P  58 year old male with past medical history of Type 2 diabetes (now treated with Insulin), Bilateral lower extremity DVT (on Eliquis),  ESRD (HD on Tues/Thurs/Sat), CAD, morbid obesity, PVD, who now presents in the ER sent in by his podiatrist Dr. Dudley due to gangrenous Right 3rd and 4th toes. Patient underwent PTA to the right CFA on 11/20. Patient waiting for surgical intervention for toe amputation later this week.     Plan  #Infected Gangrenous Right 3rd and 4th toes with Osteomyelitis  #PVD  #b/l LE DVT  - VA duplex on admission showed High-grade stenosis of the right common femoral artery. Moderate stenosis of the right popliteal artery. Normal arterial flow in the left lower extremity  - Pt underwent LE angiogram is s/p successful PTA to right CFA on 11/20.   - Pt started on ASA, Plavix and continued on Heparin per Cardiology; monitor ptt;  - Eliquis held 11/18; was on Heparin ggt before the LE angiogram and will remain on heparin for now as per cardio; f/u cardio regarding anti-platelet and anti-coagulant regimen  - Amputation of gangrenous toes right foot early next week with Dr. Go  - ID consult --> c/w Zosyn    #Type 2 diabetes   - Lantus  - Lispro TID   - ISS    #ESRD (HD)   #Hyperkalemia  - S/p HD 11/16; due for HD today  - K 5.4 This AM (Non-Hemolyzed); Lokelma 5mg givenx1, HD tomorrow; F/U PM BMP   - F/u nephro      #CAD  - Not on plavix or statin anymore at home    #MISC  - DVT: heparin  - GI: none  - Diet: renal diet    Dispo: active

## 2023-11-21 NOTE — DISCHARGE NOTE NURSING/CASE MANAGEMENT/SOCIAL WORK - PATIENT PORTAL LINK FT
You can access the FollowMyHealth Patient Portal offered by [facility] by registering at the following website: [email or web address]. By joining [facility]’s FollowMyHealth portal, you will also be able to view your health information using other applications (apps) compatible with our system.

## 2023-11-21 NOTE — PROGRESS NOTE ADULT - SUBJECTIVE AND OBJECTIVE BOX
Chief complaint: Patient is a 58y old  Male who presents with a chief complaint of Gangrenous Right 3rd toe (21 Nov 2023 09:48)    Interval history: Patient seen and examined at bedside. Patient denies cp/sob.     Review of systems: A complete 10-point review of systems was obtained and is negative except as stated in the interval history.    Vitals:  T(F): 98.2, Max: 98.8 (11-20 @ 23:40)  HR: 101 (93 - 101)  BP: 139/65 (137/63 - 161/72)  RR: 18 (18 - 18)  SpO2: 97% (97% - 98%)    Ins & outs:     11-20 @ 07:01  -  11-21 @ 07:00  --------------------------------------------------------  IN: 0 mL / OUT: 2000 mL / NET: -2000 mL    11-21 @ 07:01  -  11-21 @ 14:52  --------------------------------------------------------  IN: 474 mL / OUT: 0 mL / NET: 474 mL      Weight trend:  Weight (kg): 81.4 (11-17)    Physical exam:  General: No apparent distress  HEENT: Anicteric sclera. Moist mucous membranes. JVP *** cm.   Cardiac: Regular rate and rhythm. No murmurs, rubs, or gallops.   Vascular: Symmetric radial pulses. Dorsalis pedis pulses palpable.   Respiratory: Normal effort. Bibasilar crackles. Clear to ascultation.   Abdomen: Soft, nontender. Audible bowel sounds.   Extremities: Warm with *** edema. No cyanosis or clubbing.   Skin: Warm and dry. No rash.   Neurologic: Grossly normal motor function.   Psychiatric: Oriented to person, place, and time.     Data reviewed:  - Telemetry:    - ECG < from: 12 Lead ECG (11.08.21 @ 11:08) >    Diagnosis Line Normal sinus rhythm  Cannot rule out Anterior infarct , age undetermined  Abnormal ECG    < end of copied text >    - TTE   - Chest x-ray < from: VA Duplex Lower Extrem Arterial, Bilat (11.15.23 @ 20:35) >    Impression:  High-grade stenosis of the right common femoral artery. Moderate stenosis   of the right popliteal artery.    Normal arterial flow in theleft lower extremity.    < end of copied text >    No recent Stress test, CCTA, Cardiac catheterization or Cardiac MRI.    - Labs:                        9.8    7.30  )-----------( 199      ( 21 Nov 2023 05:50 )             31.1     11-21    137  |  99  |  69<HH>  ----------------------------<  103<H>  5.4<H>   |  20  |  9.4<HH>    Ca    9.2      21 Nov 2023 05:50  Mg     2.0     11-21    TPro  6.2  /  Alb  3.5  /  TBili  0.2  /  DBili  x   /  AST  16  /  ALT  20  /  AlkPhos  98  11-21    PTT - ( 21 Nov 2023 14:23 )  PTT:65.0 sec            Urinalysis Basic - ( 21 Nov 2023 05:50 )    Color: x / Appearance: x / SG: x / pH: x  Gluc: 103 mg/dL / Ketone: x  / Bili: x / Urobili: x   Blood: x / Protein: x / Nitrite: x   Leuk Esterase: x / RBC: x / WBC x   Sq Epi: x / Non Sq Epi: x / Bacteria: x        Medications:  amLODIPine   Tablet 5 milliGRAM(s) Oral daily  aspirin enteric coated 81 milliGRAM(s) Oral daily  chlorhexidine 2% Cloths 1 Application(s) Topical <User Schedule>  clopidogrel Tablet 75 milliGRAM(s) Oral daily  cyanocobalamin 1000 MICROGram(s) Oral daily  dextrose 50% Injectable 12.5 Gram(s) IV Push once  dextrose 50% Injectable 25 Gram(s) IV Push once  dextrose 50% Injectable 25 Gram(s) IV Push once  gabapentin 300 milliGRAM(s) Oral two times a day  glucagon  Injectable 1 milliGRAM(s) IntraMuscular once  influenza   Vaccine 0.5 milliLiter(s) IntraMuscular once  insulin glargine Injectable (LANTUS) 12 Unit(s) SubCutaneous at bedtime  insulin lispro (ADMELOG) corrective regimen sliding scale   SubCutaneous three times a day before meals  insulin lispro (ADMELOG) corrective regimen sliding scale   SubCutaneous at bedtime  insulin lispro Injectable (ADMELOG) 4 Unit(s) SubCutaneous three times a day before meals  lactobacillus acidophilus 1 Tablet(s) Oral once  Nephro-danette 1 Tablet(s) Oral daily  piperacillin/tazobactam IVPB.. 3.375 Gram(s) IV Intermittent every 12 hours  sodium zirconium cyclosilicate 5 Gram(s) Oral once    Drips:  dextrose 5%. 1000 milliLiter(s) (50 mL/Hr) IV Continuous <Continuous>  dextrose 5%. 1000 milliLiter(s) (100 mL/Hr) IV Continuous <Continuous>  heparin  Infusion.  Unit(s)/Hr (15 mL/Hr) IV Continuous <Continuous>    PRN:     Allergies    Azactam (Hives; Rash)    Intolerances   Chief complaint: Patient is a 58y old  Male who presents with a chief complaint of Gangrenous Right 3rd toe (21 Nov 2023 09:48)    Interval history: Patient seen and examined at bedside. Patient denies cp/sob.     Review of systems: A complete 10-point review of systems was obtained and is negative except as stated in the interval history.    Vitals:  T(F): 98.2, Max: 98.8 (11-20 @ 23:40)  HR: 101 (93 - 101)  BP: 139/65 (137/63 - 161/72)  RR: 18 (18 - 18)  SpO2: 97% (97% - 98%)    Ins & outs:     11-20 @ 07:01  -  11-21 @ 07:00  --------------------------------------------------------  IN: 0 mL / OUT: 2000 mL / NET: -2000 mL    11-21 @ 07:01  -  11-21 @ 14:52  --------------------------------------------------------  IN: 474 mL / OUT: 0 mL / NET: 474 mL      Weight trend:  Weight (kg): 81.4 (11-17)    Physical exam:  General: No apparent distress  HEENT: Anicteric sclera. Moist mucous membranes. JVD-  Cardiac: Regular rate and rhythm. No murmurs, rubs, or gallops.   Vascular: Symmetric radial pulses. Dorsalis pedis pulses palpable.   Respiratory: Normal effort. Bibasilar crackles. Clear to ascultation.   Abdomen: Soft, nontender. Audible bowel sounds.   Extremities: Warm with no edema. No cyanosis or clubbing.   Skin: Warm and dry. No rash.   Neurologic: Grossly normal motor function.   Psychiatric: Oriented to person, place, and time.     Data reviewed:  - Telemetry:    - ECG < from: 12 Lead ECG (11.08.21 @ 11:08) >    Diagnosis Line Normal sinus rhythm  Cannot rule out Anterior infarct , age undetermined  Abnormal ECG    < end of copied text >    - TTE   - Chest x-ray < from: VA Duplex Lower Extrem Arterial, Bilat (11.15.23 @ 20:35) >    Impression:  High-grade stenosis of the right common femoral artery. Moderate stenosis   of the right popliteal artery.    Normal arterial flow in theleft lower extremity.    < end of copied text >    No recent Stress test, CCTA, Cardiac catheterization or Cardiac MRI.    - Labs:                        9.8    7.30  )-----------( 199      ( 21 Nov 2023 05:50 )             31.1     11-21    137  |  99  |  69<HH>  ----------------------------<  103<H>  5.4<H>   |  20  |  9.4<HH>    Ca    9.2      21 Nov 2023 05:50  Mg     2.0     11-21    TPro  6.2  /  Alb  3.5  /  TBili  0.2  /  DBili  x   /  AST  16  /  ALT  20  /  AlkPhos  98  11-21    PTT - ( 21 Nov 2023 14:23 )  PTT:65.0 sec            Urinalysis Basic - ( 21 Nov 2023 05:50 )    Color: x / Appearance: x / SG: x / pH: x  Gluc: 103 mg/dL / Ketone: x  / Bili: x / Urobili: x   Blood: x / Protein: x / Nitrite: x   Leuk Esterase: x / RBC: x / WBC x   Sq Epi: x / Non Sq Epi: x / Bacteria: x        Medications:  amLODIPine   Tablet 5 milliGRAM(s) Oral daily  aspirin enteric coated 81 milliGRAM(s) Oral daily  chlorhexidine 2% Cloths 1 Application(s) Topical <User Schedule>  clopidogrel Tablet 75 milliGRAM(s) Oral daily  cyanocobalamin 1000 MICROGram(s) Oral daily  dextrose 50% Injectable 12.5 Gram(s) IV Push once  dextrose 50% Injectable 25 Gram(s) IV Push once  dextrose 50% Injectable 25 Gram(s) IV Push once  gabapentin 300 milliGRAM(s) Oral two times a day  glucagon  Injectable 1 milliGRAM(s) IntraMuscular once  influenza   Vaccine 0.5 milliLiter(s) IntraMuscular once  insulin glargine Injectable (LANTUS) 12 Unit(s) SubCutaneous at bedtime  insulin lispro (ADMELOG) corrective regimen sliding scale   SubCutaneous three times a day before meals  insulin lispro (ADMELOG) corrective regimen sliding scale   SubCutaneous at bedtime  insulin lispro Injectable (ADMELOG) 4 Unit(s) SubCutaneous three times a day before meals  lactobacillus acidophilus 1 Tablet(s) Oral once  Nephro-danette 1 Tablet(s) Oral daily  piperacillin/tazobactam IVPB.. 3.375 Gram(s) IV Intermittent every 12 hours  sodium zirconium cyclosilicate 5 Gram(s) Oral once    Drips:  dextrose 5%. 1000 milliLiter(s) (50 mL/Hr) IV Continuous <Continuous>  dextrose 5%. 1000 milliLiter(s) (100 mL/Hr) IV Continuous <Continuous>  heparin  Infusion.  Unit(s)/Hr (15 mL/Hr) IV Continuous <Continuous>    PRN:     Allergies    Azactam (Hives; Rash)    Intolerances

## 2023-11-22 LAB
ANION GAP SERPL CALC-SCNC: 20 MMOL/L — HIGH (ref 7–14)
ANION GAP SERPL CALC-SCNC: 20 MMOL/L — HIGH (ref 7–14)
APTT BLD: 56.1 SEC — HIGH (ref 27–39.2)
APTT BLD: 56.1 SEC — HIGH (ref 27–39.2)
APTT BLD: 67.2 SEC — HIGH (ref 27–39.2)
APTT BLD: 67.2 SEC — HIGH (ref 27–39.2)
APTT BLD: 72.6 SEC — CRITICAL HIGH (ref 27–39.2)
APTT BLD: 72.6 SEC — CRITICAL HIGH (ref 27–39.2)
BUN SERPL-MCNC: 75 MG/DL — CRITICAL HIGH (ref 10–20)
BUN SERPL-MCNC: 75 MG/DL — CRITICAL HIGH (ref 10–20)
CALCIUM SERPL-MCNC: 8.6 MG/DL — SIGNIFICANT CHANGE UP (ref 8.4–10.5)
CALCIUM SERPL-MCNC: 8.6 MG/DL — SIGNIFICANT CHANGE UP (ref 8.4–10.5)
CHLORIDE SERPL-SCNC: 99 MMOL/L — SIGNIFICANT CHANGE UP (ref 98–110)
CHLORIDE SERPL-SCNC: 99 MMOL/L — SIGNIFICANT CHANGE UP (ref 98–110)
CO2 SERPL-SCNC: 18 MMOL/L — SIGNIFICANT CHANGE UP (ref 17–32)
CO2 SERPL-SCNC: 18 MMOL/L — SIGNIFICANT CHANGE UP (ref 17–32)
CREAT SERPL-MCNC: 10.1 MG/DL — CRITICAL HIGH (ref 0.7–1.5)
CREAT SERPL-MCNC: 10.1 MG/DL — CRITICAL HIGH (ref 0.7–1.5)
EGFR: 5 ML/MIN/1.73M2 — LOW
EGFR: 5 ML/MIN/1.73M2 — LOW
GLUCOSE BLDC GLUCOMTR-MCNC: 124 MG/DL — HIGH (ref 70–99)
GLUCOSE BLDC GLUCOMTR-MCNC: 124 MG/DL — HIGH (ref 70–99)
GLUCOSE BLDC GLUCOMTR-MCNC: 138 MG/DL — HIGH (ref 70–99)
GLUCOSE BLDC GLUCOMTR-MCNC: 138 MG/DL — HIGH (ref 70–99)
GLUCOSE BLDC GLUCOMTR-MCNC: 140 MG/DL — HIGH (ref 70–99)
GLUCOSE BLDC GLUCOMTR-MCNC: 140 MG/DL — HIGH (ref 70–99)
GLUCOSE BLDC GLUCOMTR-MCNC: 174 MG/DL — HIGH (ref 70–99)
GLUCOSE BLDC GLUCOMTR-MCNC: 174 MG/DL — HIGH (ref 70–99)
GLUCOSE SERPL-MCNC: 122 MG/DL — HIGH (ref 70–99)
GLUCOSE SERPL-MCNC: 122 MG/DL — HIGH (ref 70–99)
HCT VFR BLD CALC: 29.5 % — LOW (ref 42–52)
HCT VFR BLD CALC: 29.5 % — LOW (ref 42–52)
HGB BLD-MCNC: 9.4 G/DL — LOW (ref 14–18)
HGB BLD-MCNC: 9.4 G/DL — LOW (ref 14–18)
MAGNESIUM SERPL-MCNC: 2.1 MG/DL — SIGNIFICANT CHANGE UP (ref 1.8–2.4)
MAGNESIUM SERPL-MCNC: 2.1 MG/DL — SIGNIFICANT CHANGE UP (ref 1.8–2.4)
MCHC RBC-ENTMCNC: 28.3 PG — SIGNIFICANT CHANGE UP (ref 27–31)
MCHC RBC-ENTMCNC: 28.3 PG — SIGNIFICANT CHANGE UP (ref 27–31)
MCHC RBC-ENTMCNC: 31.9 G/DL — LOW (ref 32–37)
MCHC RBC-ENTMCNC: 31.9 G/DL — LOW (ref 32–37)
MCV RBC AUTO: 88.9 FL — SIGNIFICANT CHANGE UP (ref 80–94)
MCV RBC AUTO: 88.9 FL — SIGNIFICANT CHANGE UP (ref 80–94)
NRBC # BLD: 0 /100 WBCS — SIGNIFICANT CHANGE UP (ref 0–0)
NRBC # BLD: 0 /100 WBCS — SIGNIFICANT CHANGE UP (ref 0–0)
PLATELET # BLD AUTO: 199 K/UL — SIGNIFICANT CHANGE UP (ref 130–400)
PLATELET # BLD AUTO: 199 K/UL — SIGNIFICANT CHANGE UP (ref 130–400)
PMV BLD: 10.9 FL — HIGH (ref 7.4–10.4)
PMV BLD: 10.9 FL — HIGH (ref 7.4–10.4)
POTASSIUM SERPL-MCNC: 5 MMOL/L — SIGNIFICANT CHANGE UP (ref 3.5–5)
POTASSIUM SERPL-MCNC: 5 MMOL/L — SIGNIFICANT CHANGE UP (ref 3.5–5)
POTASSIUM SERPL-SCNC: 5 MMOL/L — SIGNIFICANT CHANGE UP (ref 3.5–5)
POTASSIUM SERPL-SCNC: 5 MMOL/L — SIGNIFICANT CHANGE UP (ref 3.5–5)
RBC # BLD: 3.32 M/UL — LOW (ref 4.7–6.1)
RBC # BLD: 3.32 M/UL — LOW (ref 4.7–6.1)
RBC # FLD: 15 % — HIGH (ref 11.5–14.5)
RBC # FLD: 15 % — HIGH (ref 11.5–14.5)
SODIUM SERPL-SCNC: 137 MMOL/L — SIGNIFICANT CHANGE UP (ref 135–146)
SODIUM SERPL-SCNC: 137 MMOL/L — SIGNIFICANT CHANGE UP (ref 135–146)
WBC # BLD: 7.1 K/UL — SIGNIFICANT CHANGE UP (ref 4.8–10.8)
WBC # BLD: 7.1 K/UL — SIGNIFICANT CHANGE UP (ref 4.8–10.8)
WBC # FLD AUTO: 7.1 K/UL — SIGNIFICANT CHANGE UP (ref 4.8–10.8)
WBC # FLD AUTO: 7.1 K/UL — SIGNIFICANT CHANGE UP (ref 4.8–10.8)

## 2023-11-22 PROCEDURE — 99232 SBSQ HOSP IP/OBS MODERATE 35: CPT

## 2023-11-22 RX ADMIN — HEPARIN SODIUM 1900 UNIT(S)/HR: 5000 INJECTION INTRAVENOUS; SUBCUTANEOUS at 17:17

## 2023-11-22 RX ADMIN — Medication 4 UNIT(S): at 17:23

## 2023-11-22 RX ADMIN — CHLORHEXIDINE GLUCONATE 1 APPLICATION(S): 213 SOLUTION TOPICAL at 05:14

## 2023-11-22 RX ADMIN — Medication 81 MILLIGRAM(S): at 13:21

## 2023-11-22 RX ADMIN — PREGABALIN 1000 MICROGRAM(S): 225 CAPSULE ORAL at 13:21

## 2023-11-22 RX ADMIN — CLOPIDOGREL BISULFATE 75 MILLIGRAM(S): 75 TABLET, FILM COATED ORAL at 13:21

## 2023-11-22 RX ADMIN — HEPARIN SODIUM 1900 UNIT(S)/HR: 5000 INJECTION INTRAVENOUS; SUBCUTANEOUS at 08:29

## 2023-11-22 RX ADMIN — Medication 4 UNIT(S): at 08:30

## 2023-11-22 RX ADMIN — Medication 1 TABLET(S): at 13:21

## 2023-11-22 RX ADMIN — Medication 2: at 13:34

## 2023-11-22 RX ADMIN — PIPERACILLIN AND TAZOBACTAM 25 GRAM(S): 4; .5 INJECTION, POWDER, LYOPHILIZED, FOR SOLUTION INTRAVENOUS at 17:18

## 2023-11-22 RX ADMIN — GABAPENTIN 300 MILLIGRAM(S): 400 CAPSULE ORAL at 17:18

## 2023-11-22 RX ADMIN — Medication 4 UNIT(S): at 13:22

## 2023-11-22 RX ADMIN — Medication 650 MILLIGRAM(S): at 22:39

## 2023-11-22 RX ADMIN — ATORVASTATIN CALCIUM 40 MILLIGRAM(S): 80 TABLET, FILM COATED ORAL at 21:39

## 2023-11-22 RX ADMIN — INSULIN GLARGINE 12 UNIT(S): 100 INJECTION, SOLUTION SUBCUTANEOUS at 21:39

## 2023-11-22 RX ADMIN — Medication 650 MILLIGRAM(S): at 21:39

## 2023-11-22 RX ADMIN — PIPERACILLIN AND TAZOBACTAM 25 GRAM(S): 4; .5 INJECTION, POWDER, LYOPHILIZED, FOR SOLUTION INTRAVENOUS at 05:16

## 2023-11-22 RX ADMIN — GABAPENTIN 300 MILLIGRAM(S): 400 CAPSULE ORAL at 05:16

## 2023-11-22 NOTE — PROGRESS NOTE ADULT - ASSESSMENT
Assessment:  58 year old male with past medical history of Type 2 diabetes (now treated with Insulin), Bilateral lower extremity DVT (on Eliquis),  ESRD (HD on Tues/Thurs/Sat), CAD, morbid obesity, PVD, who now presents in the ER sent in by his podiatrist Dr. Dudley due to gangrenous Right 3rd and 4th toes. Patient underwent PTA to the right CFA on 11/20. Patient waiting for surgical intervention for toe amputation later this week.     Plan:  #Infected Gangrenous Right 3rd and 4th toes with Osteomyelitis  #PVD  #b/l LE DVT  - VA duplex on admission showed High-grade stenosis of the right common femoral artery. Moderate stenosis of the right popliteal artery. Normal arterial flow in the left lower extremity  - Pt underwent LE angiogram is s/p successful PTA to right CFA on 11/20.   - Pt started on ASA, Plavix and continued on Heparin; monitor ptt;   - Eliquis held 11/18; was on Heparin ggt before the LE angiogram and will remain on heparin for now (will restart Eliquis 2.5 after amputation)  - Amputation of gangrenous toes right foot early next week   - ID following: c/w Zosyn    #Type 2 diabetes  - Monitor FS  - Carb consistent diet   - Lantus  - Lispro TID   - ISS    #ESRD (HD)   #Hyperkalemia  - HD today 3 hrs 2 K bath BF 4540 UF 2.5 L  - Nephro following     #CAD  - Not on plavix or statin anymore at home    #MISC  - DVT: heparin  - GI: none  - Diet: renal diet    Dispo: active       Assessment:  58 year old male with past medical history of Type 2 diabetes (now treated with Insulin), Bilateral lower extremity DVT (on Eliquis),  ESRD (HD on Tues/Thurs/Sat), CAD, morbid obesity, PVD, who now presents in the ER sent in by his podiatrist Dr. Dudley due to gangrenous Right 3rd and 4th toes. Patient underwent PTA to the right CFA on 11/20. Patient waiting for surgical intervention for toe amputation later this week.     Plan:  #Infected Gangrenous Right 3rd and 4th toes with Osteomyelitis  #PVD  #b/l LE DVT  - VA duplex on admission showed High-grade stenosis of the right common femoral artery. Moderate stenosis of the right popliteal artery. Normal arterial flow in the left lower extremity  - Pt underwent LE angiogram is s/p successful PTA to right CFA on 11/20.   - Pt started on ASA, Plavix and continued on Heparin; monitor ptt;   - Eliquis held 11/18; was on Heparin ggt before the LE angiogram and will remain on heparin for now (will restart Eliquis 2.5 after amputation)  - Amputation of gangrenous toes right foot early next week   - ID following: c/w Zosyn    #HTN  - Patient hypertensive  - F/u BP after HD  - Continue amlodipine 5mg    #CAD  - S/p peripheral angio  - C/w atorvastatin 40mg qhs  - C/w ASA and Plavix    #Type 2 diabetes  - Monitor FS  - Carb consistent diet   - Lantus  - Lispro TID   - ISS    #ESRD (HD)   #Hyperkalemia  - Monitor K  - HD today 3 hrs 2 K bath BF 4540 UF 2.5 L  - Nephro following     #MISC  - DVT: heparin  - GI: none  - Diet: renal diet    Dispo: active       Assessment:  58 year old male with past medical history of Type 2 diabetes (now treated with Insulin), Bilateral lower extremity DVT (on Eliquis),  ESRD (HD on Tues/Thurs/Sat), CAD, morbid obesity, PVD, who now presents in the ER sent in by his podiatrist Dr. Dudley due to gangrenous Right 3rd and 4th toes. Patient underwent PTA to the right CFA on 11/20. Patient waiting for surgical intervention for toe amputation next week.    Plan:  #Infected Gangrenous Right 3rd and 4th toes with Osteomyelitis  #PVD  #b/l LE DVT  - VA duplex on admission showed High-grade stenosis of the right common femoral artery. Moderate stenosis of the right popliteal artery. Normal arterial flow in the left lower extremity  - Pt underwent LE angiogram is s/p successful PTA to right CFA on 11/20.   - Pt started on ASA, Plavix and continued on Heparin; monitor ptt;   - Eliquis held 11/18; was on Heparin ggt before the LE angiogram and will remain on heparin for now (will restart Eliquis 2.5 after amputation)  - Amputation of gangrenous toes right foot early next week   - ID following: c/w Zosyn    #HTN  - Patient hypertensive  - F/u BP after HD  - Continue amlodipine 5mg    #CAD  - S/p peripheral angio  - C/w atorvastatin 40mg qhs  - C/w ASA and Plavix    #Type 2 diabetes  - Monitor FS  - Carb consistent diet   - Lantus  - Lispro TID   - ISS    #ESRD (HD)   #Hyperkalemia  - Monitor K  - HD today 3 hrs 2 K bath BF 4540 UF 2.5 L  - Nephro following     #MISC  - DVT: heparin  - GI: none  - Diet: renal diet    Dispo: active

## 2023-11-22 NOTE — PROGRESS NOTE ADULT - SUBJECTIVE AND OBJECTIVE BOX
Nephrology progress note    Patient is seen and examined, events over the last 24 h noted .  Seen on HD  Denies SOB  Allergies:  Azactam (Hives; Rash)    Hospital Medications:   MEDICATIONS  (STANDING):  amLODIPine   Tablet 5 milliGRAM(s) Oral daily  aspirin enteric coated 81 milliGRAM(s) Oral daily  atorvastatin 40 milliGRAM(s) Oral at bedtime  chlorhexidine 2% Cloths 1 Application(s) Topical <User Schedule>  clopidogrel Tablet 75 milliGRAM(s) Oral daily  cyanocobalamin 1000 MICROGram(s) Oral daily  dextrose 5%. 1000 milliLiter(s) (50 mL/Hr) IV Continuous <Continuous>  dextrose 5%. 1000 milliLiter(s) (100 mL/Hr) IV Continuous <Continuous>  dextrose 50% Injectable 25 Gram(s) IV Push once  dextrose 50% Injectable 12.5 Gram(s) IV Push once  dextrose 50% Injectable 25 Gram(s) IV Push once  gabapentin 300 milliGRAM(s) Oral two times a day  glucagon  Injectable 1 milliGRAM(s) IntraMuscular once  heparin  Infusion.  Unit(s)/Hr (15 mL/Hr) IV Continuous <Continuous>  influenza   Vaccine 0.5 milliLiter(s) IntraMuscular once  insulin glargine Injectable (LANTUS) 12 Unit(s) SubCutaneous at bedtime  insulin lispro (ADMELOG) corrective regimen sliding scale   SubCutaneous three times a day before meals  insulin lispro (ADMELOG) corrective regimen sliding scale   SubCutaneous at bedtime  insulin lispro Injectable (ADMELOG) 4 Unit(s) SubCutaneous three times a day before meals  lactobacillus acidophilus 1 Tablet(s) Oral once  Nephro-danette 1 Tablet(s) Oral daily  piperacillin/tazobactam IVPB.. 3.375 Gram(s) IV Intermittent every 12 hours  sodium zirconium cyclosilicate 5 Gram(s) Oral once        VITALS:  T(F): 98 (11-22-23 @ 07:53), Max: 98.3 (11-21-23 @ 20:45)  HR: 97 (11-22-23 @ 08:40)  BP: 172/97 (11-22-23 @ 08:40)  RR: 18 (11-22-23 @ 08:40)  SpO2: 97% (11-22-23 @ 04:28)  Wt(kg): --    11-20 @ 07:01  -  11-21 @ 07:00  --------------------------------------------------------  IN: 0 mL / OUT: 2000 mL / NET: -2000 mL    11-21 @ 07:01  -  11-22 @ 07:00  --------------------------------------------------------  IN: 981 mL / OUT: 300 mL / NET: 681 mL          PHYSICAL EXAM:  Constitutional: NAD  HEENT: anicteric sclera  Neck: No JVD  Respiratory: CTA  Cardiovascular: S1, S2, RRR  Gastrointestinal: BS+, soft, NT/ND  Extremities: Mild peripheral edema, Rt foot dry dressing  Neurological: A/O x 3, no focal deficits  : No CVA tenderness. No callahan.   Skin: No rashes  Vascular Access: TDC    LABS:  11-22    137  |  99  |  75<HH>  ----------------------------<  122<H>  5.0   |  18  |  10.1<HH>    Ca    8.6      22 Nov 2023 06:09  Mg     2.1     11-22    TPro  6.2  /  Alb  3.5  /  TBili  0.2  /  DBili      /  AST  16  /  ALT  20  /  AlkPhos  98  11-21                          9.4    7.10  )-----------( 199      ( 22 Nov 2023 06:09 )             29.5       Urine Studies:  Urinalysis Basic - ( 22 Nov 2023 06:09 )    Color:  / Appearance:  / SG:  / pH:   Gluc: 122 mg/dL / Ketone:   / Bili:  / Urobili:    Blood:  / Protein:  / Nitrite:    Leuk Esterase:  / RBC:  / WBC    Sq Epi:  / Non Sq Epi:  / Bacteria:         RADIOLOGY & ADDITIONAL STUDIES:

## 2023-11-22 NOTE — PROGRESS NOTE ADULT - NS ATTEND AMEND GEN_ALL_CORE FT
In brief, 58-year-old man, hx of ESRD on HD, PVD, LE DVT on AC, chart history of CAD (recalls PCI >3 years ago), and obesity who was referred by podiatry for gangrenous RLE 3rd and 4th digits. Now s/p PTA of the R CFA 11/20 and awaiting surgical intervention for his toe later this week.    Today feels well post revascularization.  Continue DAPT, resume heparin for hx of DVT.  Podiatry following - awaiting recommendations for possible intervention next week.  Continue statin (was not taking previously).  Check TTE (none in the EMR).

## 2023-11-22 NOTE — PROGRESS NOTE ADULT - SUBJECTIVE AND OBJECTIVE BOX
Chief complaint: Patient is a 58y old  Male who presents with a chief complaint of Gangrenous Right 3rd toe (21 Nov 2023 09:48)    Interval history: Patient seen and examined at bedside. Patient denies cp/sob.     Review of systems: A complete 10-point review of systems was obtained and is negative except as stated in the interval history.    Vitals:  ICU Vital Signs Last 24 Hrs  T(C): 36.7 (22 Nov 2023 07:53), Max: 36.8 (21 Nov 2023 20:45)  T(F): 98 (22 Nov 2023 07:53), Max: 98.3 (21 Nov 2023 20:45)  HR: 97 (22 Nov 2023 08:40) (90 - 102)  BP: 172/97 (22 Nov 2023 08:40) (152/70 - 172/97)  BP(mean): 105 (22 Nov 2023 07:53) (100 - 115)  RR: 18 (22 Nov 2023 08:40) (18 - 18)  SpO2: 97% (22 Nov 2023 04:28) (97% - 99%)    O2 Parameters below as of 22 Nov 2023 08:40  Patient On (Oxygen Delivery Method): room air      Ins & outs:     11-20 @ 07:01  -  11-21 @ 07:00  --------------------------------------------------------  IN: 0 mL / OUT: 2000 mL / NET: -2000 mL    21 Nov 2023 07:01  -  22 Nov 2023 07:00  --------------------------------------------------------  IN: 981 mL / OUT: 300 mL / NET: 681 mL    Weight trend:  Weight (kg): 81.4 (11-17)    Physical exam:  General: No apparent distress  HEENT: Anicteric sclera. Moist mucous membranes. JVD-  Cardiac: Regular rate and rhythm. No murmurs, rubs, or gallops.   Vascular: Symmetric radial pulses. Dorsalis pedis pulses palpable.   Respiratory: Normal effort. Bibasilar crackles. Clear to ascultation.   Abdomen: Soft, nontender. Audible bowel sounds.   Extremities: Warm with no edema. No cyanosis or clubbing.   Skin: Warm and dry. No rash.   Neurologic: Grossly normal motor function.   Psychiatric: Oriented to person, place, and time.     Data reviewed:  - Telemetry: SR HR: 76-101bpm    - ECG  (11.08.21)    Diagnosis Line Normal sinus rhythm  Cannot rule out Anterior infarct , age undetermined  Abnormal ECG      -VA Duplex Lower Extrem Arterial, Bilat (11.15.23)    Impression:  High-grade stenosis of the right common femoral artery. Moderate stenosis   of the right popliteal artery.    Normal arterial flow in theleft lower extremity.      Peripheral Angio (11/22/23):  ACCESS: Ultrasound Guided  left femoral artery access 6fr    VASCULAR CLOSURE DEVICE (if applicable): manual hold    ANGIOGRAM:  Selective Abdominal Aorta, Right Iliac, Right SFA, Left Iliac, Left SFA, Right and Left Lower Extremity DSA angiography     DETAILED PROCEDURE SUMMARY:  After obtaining informed consent, the patient was brought to the catheterization suite in a post- absorptive and non-sedated state. After this, a timeout was performed and I administered moderate sedation throughout this 45-minute procedure. An independent trained observer pushed medications at my direction, and monitored the patient’s level of consciousness and physiological status throughout. The patient was prepped and draped in the usual sterile manner. 1% lidocaine was used for local anesthesia and the patient was sedated as per endovascular lab protocol. Access was obtained in the  Femoral Artery using the modified Seldinger technique 5/6/7 English Sheath was placed in the artery under fluoroscopy and ultrasound guidance which was utilized for assessment of arterial patency and actual real-time visualization of needle passage to the arterial lumen. The sheath was exchanged for 6 Fr 45 cm destination sheath prior to the interventional part of the procedure.      No recent Stress test, CCTA, Cardiac catheterization or Cardiac MRI.        - Labs:                        9.4    7.10  )-----------( 199      ( 22 Nov 2023 06:09 )             29.5     11-22    137  |  99  |  75<HH>  ----------------------------<  122<H>  5.0   |  18  |  10.1<HH>    Ca    8.6      22 Nov 2023 06:09  Mg     2.1     11-22    TPro  6.2  /  Alb  3.5  /  TBili  0.2  /  DBili  x   /  AST  16  /  ALT  20  /  AlkPhos  98  11-21    LIVER FUNCTIONS - ( 21 Nov 2023 05:50 )  Alb: 3.5 g/dL / Pro: 6.2 g/dL / ALK PHOS: 98 U/L / ALT: 20 U/L / AST: 16 U/L / GGT: x           PTT - ( 22 Nov 2023 06:09 )  PTT:56.1 sec          Lactate Trend    Urinalysis Basic - ( 22 Nov 2023 06:09 )    Color: x / Appearance: x / SG: x / pH: x  Gluc: 122 mg/dL / Ketone: x  / Bili: x / Urobili: x   Blood: x / Protein: x / Nitrite: x   Leuk Esterase: x / RBC: x / WBC x   Sq Epi: x / Non Sq Epi: x / Bacteria: x          Medications:  MEDICATIONS  (STANDING):  amLODIPine   Tablet 5 milliGRAM(s) Oral daily  aspirin enteric coated 81 milliGRAM(s) Oral daily  atorvastatin 40 milliGRAM(s) Oral at bedtime  chlorhexidine 2% Cloths 1 Application(s) Topical <User Schedule>  clopidogrel Tablet 75 milliGRAM(s) Oral daily  cyanocobalamin 1000 MICROGram(s) Oral daily  dextrose 5%. 1000 milliLiter(s) (50 mL/Hr) IV Continuous <Continuous>  dextrose 5%. 1000 milliLiter(s) (100 mL/Hr) IV Continuous <Continuous>  dextrose 50% Injectable 25 Gram(s) IV Push once  dextrose 50% Injectable 25 Gram(s) IV Push once  dextrose 50% Injectable 12.5 Gram(s) IV Push once  gabapentin 300 milliGRAM(s) Oral two times a day  glucagon  Injectable 1 milliGRAM(s) IntraMuscular once  heparin  Infusion.  Unit(s)/Hr (15 mL/Hr) IV Continuous <Continuous>  influenza   Vaccine 0.5 milliLiter(s) IntraMuscular once  insulin glargine Injectable (LANTUS) 12 Unit(s) SubCutaneous at bedtime  insulin lispro (ADMELOG) corrective regimen sliding scale   SubCutaneous three times a day before meals  insulin lispro (ADMELOG) corrective regimen sliding scale   SubCutaneous at bedtime  insulin lispro Injectable (ADMELOG) 4 Unit(s) SubCutaneous three times a day before meals  lactobacillus acidophilus 1 Tablet(s) Oral once  Nephro-danette 1 Tablet(s) Oral daily  piperacillin/tazobactam IVPB.. 3.375 Gram(s) IV Intermittent every 12 hours  sodium zirconium cyclosilicate 5 Gram(s) Oral once    MEDICATIONS  (PRN):  acetaminophen     Tablet .. 650 milliGRAM(s) Oral every 6 hours PRN Temp greater or equal to 38C (100.4F), Mild Pain (1 - 3)  dextrose Oral Gel 15 Gram(s) Oral once PRN Blood Glucose LESS THAN 70 milliGRAM(s)/deciliter  heparin   Injectable 6500 Unit(s) IV Push every 6 hours PRN For aPTT less than 40  heparin   Injectable 3000 Unit(s) IV Push every 6 hours PRN For aPTT between 40 - 57  melatonin 3 milliGRAM(s) Oral at bedtime PRN Insomnia  ondansetron Injectable 4 milliGRAM(s) IV Push every 8 hours PRN Nausea and/or Vomiting    Allergies    Azactam (Hives; Rash)    Intolerances

## 2023-11-22 NOTE — PROGRESS NOTE ADULT - SUBJECTIVE AND OBJECTIVE BOX
SUBJ:      MEDICATIONS  (STANDING):  amLODIPine   Tablet 5 milliGRAM(s) Oral daily  aspirin enteric coated 81 milliGRAM(s) Oral daily  atorvastatin 40 milliGRAM(s) Oral at bedtime  chlorhexidine 2% Cloths 1 Application(s) Topical <User Schedule>  clopidogrel Tablet 75 milliGRAM(s) Oral daily  cyanocobalamin 1000 MICROGram(s) Oral daily  dextrose 5%. 1000 milliLiter(s) (50 mL/Hr) IV Continuous <Continuous>  dextrose 5%. 1000 milliLiter(s) (100 mL/Hr) IV Continuous <Continuous>  dextrose 50% Injectable 25 Gram(s) IV Push once  dextrose 50% Injectable 12.5 Gram(s) IV Push once  dextrose 50% Injectable 25 Gram(s) IV Push once  gabapentin 300 milliGRAM(s) Oral two times a day  glucagon  Injectable 1 milliGRAM(s) IntraMuscular once  heparin  Infusion.  Unit(s)/Hr (15 mL/Hr) IV Continuous <Continuous>  influenza   Vaccine 0.5 milliLiter(s) IntraMuscular once  insulin glargine Injectable (LANTUS) 12 Unit(s) SubCutaneous at bedtime  insulin lispro (ADMELOG) corrective regimen sliding scale   SubCutaneous three times a day before meals  insulin lispro (ADMELOG) corrective regimen sliding scale   SubCutaneous at bedtime  insulin lispro Injectable (ADMELOG) 4 Unit(s) SubCutaneous three times a day before meals  lactobacillus acidophilus 1 Tablet(s) Oral once  Nephro-danette 1 Tablet(s) Oral daily  piperacillin/tazobactam IVPB.. 3.375 Gram(s) IV Intermittent every 12 hours  sodium zirconium cyclosilicate 5 Gram(s) Oral once    MEDICATIONS  (PRN):  acetaminophen     Tablet .. 650 milliGRAM(s) Oral every 6 hours PRN Temp greater or equal to 38C (100.4F), Mild Pain (1 - 3)  dextrose Oral Gel 15 Gram(s) Oral once PRN Blood Glucose LESS THAN 70 milliGRAM(s)/deciliter  heparin   Injectable 6500 Unit(s) IV Push every 6 hours PRN For aPTT less than 40  heparin   Injectable 3000 Unit(s) IV Push every 6 hours PRN For aPTT between 40 - 57  melatonin 3 milliGRAM(s) Oral at bedtime PRN Insomnia  ondansetron Injectable 4 milliGRAM(s) IV Push every 8 hours PRN Nausea and/or Vomiting            Vital Signs Last 24 Hrs  T(C): 36.6 (22 Nov 2023 15:45), Max: 36.8 (21 Nov 2023 20:45)  T(F): 97.9 (22 Nov 2023 15:45), Max: 98.3 (21 Nov 2023 20:45)  HR: 96 (22 Nov 2023 15:45) (91 - 102)  BP: 163/77 (22 Nov 2023 15:45) (118/80 - 172/97)  BP(mean): 110 (22 Nov 2023 15:45) (100 - 115)  RR: 18 (22 Nov 2023 15:45) (18 - 18)  SpO2: 96% (22 Nov 2023 15:45) (96% - 99%)    Parameters below as of 22 Nov 2023 11:50  Patient On (Oxygen Delivery Method): room air         REVIEW OF SYSTEMS:  CONSTITUTIONAL: No fever, weight loss, or fatigue  CARDIOLOGY: PAtient denies chest pain, shortness of breath or syncopal episodes.   RESPIRATORY: denies shortness of breath, wheezeing.   NEUROLOGICAL: NO weakness, no focal deficits to report.  GI: no BRBPR, no N,V,diarrhea.    PSYCHIATRY: normal mood and affect  HEENT: no nasal discharge, no ecchymosis  SKIN: no ecchymosis, no breakdown  MUSCULOSKELETAL: Full range of motion x4.        PHYSICAL EXAM:  · CONSTITUTIONAL:	Well-developed, well nourished    BMI-  ·RESPIRATORY:   airway patent; breath sounds equal; good air movement; respirations non-labored; clear to auscultation bilaterally; no chest wall tenderness; no intercostal retractions; no rales,rhonchi or wheeze  · CARDIOVASCULAR	regular rate and rhythm  no rub  no murmur  normal PMI  · EXTREMITIES: No cyanosis, clubbing or edema  · VASCULAR: 	Equal and normal pulses (carotid, femoral, dorsalis pedis)  	  TELEMETRY:    ECG:    TTE:    LABS:                        9.4    7.10  )-----------( 199      ( 22 Nov 2023 06:09 )             29.5     11-22    137  |  99  |  75<HH>  ----------------------------<  122<H>  5.0   |  18  |  10.1<HH>    Ca    8.6      22 Nov 2023 06:09  Mg     2.1     11-22    TPro  6.2  /  Alb  3.5  /  TBili  0.2  /  DBili  x   /  AST  16  /  ALT  20  /  AlkPhos  98  11-21        PTT - ( 22 Nov 2023 15:11 )  PTT:72.6 sec    I&O's Summary    21 Nov 2023 07:01  -  22 Nov 2023 07:00  --------------------------------------------------------  IN: 981 mL / OUT: 300 mL / NET: 681 mL    22 Nov 2023 07:01  -  22 Nov 2023 19:19  --------------------------------------------------------  IN: 228 mL / OUT: 0 mL / NET: 228 mL      BNP  RADIOLOGY & ADDITIONAL STUDIES:    IMPRESSION AND PLAN:

## 2023-11-22 NOTE — PROGRESS NOTE ADULT - ASSESSMENT
ESRD on HD TTS in [place]  R foot gangrene  HTN  Normocytic anemia  - HD today 3 hrs 2 K bath BF 4540 UF 2.5 L  - renal diet  - check ph level   - monitor h/h   - BP controlled  - dose abx for iHD, on zosyn  followed by vascular/podiatry / sp PTA right CFA    will follow

## 2023-11-23 LAB
ANION GAP SERPL CALC-SCNC: 17 MMOL/L — HIGH (ref 7–14)
ANION GAP SERPL CALC-SCNC: 17 MMOL/L — HIGH (ref 7–14)
BUN SERPL-MCNC: 45 MG/DL — HIGH (ref 10–20)
BUN SERPL-MCNC: 45 MG/DL — HIGH (ref 10–20)
CALCIUM SERPL-MCNC: 8.8 MG/DL — SIGNIFICANT CHANGE UP (ref 8.4–10.5)
CALCIUM SERPL-MCNC: 8.8 MG/DL — SIGNIFICANT CHANGE UP (ref 8.4–10.5)
CHLORIDE SERPL-SCNC: 102 MMOL/L — SIGNIFICANT CHANGE UP (ref 98–110)
CHLORIDE SERPL-SCNC: 102 MMOL/L — SIGNIFICANT CHANGE UP (ref 98–110)
CO2 SERPL-SCNC: 25 MMOL/L — SIGNIFICANT CHANGE UP (ref 17–32)
CO2 SERPL-SCNC: 25 MMOL/L — SIGNIFICANT CHANGE UP (ref 17–32)
CREAT SERPL-MCNC: 7.3 MG/DL — CRITICAL HIGH (ref 0.7–1.5)
CREAT SERPL-MCNC: 7.3 MG/DL — CRITICAL HIGH (ref 0.7–1.5)
EGFR: 8 ML/MIN/1.73M2 — LOW
EGFR: 8 ML/MIN/1.73M2 — LOW
GLUCOSE BLDC GLUCOMTR-MCNC: 113 MG/DL — HIGH (ref 70–99)
GLUCOSE BLDC GLUCOMTR-MCNC: 113 MG/DL — HIGH (ref 70–99)
GLUCOSE BLDC GLUCOMTR-MCNC: 123 MG/DL — HIGH (ref 70–99)
GLUCOSE BLDC GLUCOMTR-MCNC: 123 MG/DL — HIGH (ref 70–99)
GLUCOSE BLDC GLUCOMTR-MCNC: 162 MG/DL — HIGH (ref 70–99)
GLUCOSE BLDC GLUCOMTR-MCNC: 162 MG/DL — HIGH (ref 70–99)
GLUCOSE BLDC GLUCOMTR-MCNC: 164 MG/DL — HIGH (ref 70–99)
GLUCOSE BLDC GLUCOMTR-MCNC: 164 MG/DL — HIGH (ref 70–99)
GLUCOSE SERPL-MCNC: 123 MG/DL — HIGH (ref 70–99)
GLUCOSE SERPL-MCNC: 123 MG/DL — HIGH (ref 70–99)
HCT VFR BLD CALC: 29.9 % — LOW (ref 42–52)
HCT VFR BLD CALC: 29.9 % — LOW (ref 42–52)
HGB BLD-MCNC: 9.2 G/DL — LOW (ref 14–18)
HGB BLD-MCNC: 9.2 G/DL — LOW (ref 14–18)
MAGNESIUM SERPL-MCNC: 1.9 MG/DL — SIGNIFICANT CHANGE UP (ref 1.8–2.4)
MAGNESIUM SERPL-MCNC: 1.9 MG/DL — SIGNIFICANT CHANGE UP (ref 1.8–2.4)
MCHC RBC-ENTMCNC: 27.5 PG — SIGNIFICANT CHANGE UP (ref 27–31)
MCHC RBC-ENTMCNC: 27.5 PG — SIGNIFICANT CHANGE UP (ref 27–31)
MCHC RBC-ENTMCNC: 30.8 G/DL — LOW (ref 32–37)
MCHC RBC-ENTMCNC: 30.8 G/DL — LOW (ref 32–37)
MCV RBC AUTO: 89.5 FL — SIGNIFICANT CHANGE UP (ref 80–94)
MCV RBC AUTO: 89.5 FL — SIGNIFICANT CHANGE UP (ref 80–94)
NRBC # BLD: 0 /100 WBCS — SIGNIFICANT CHANGE UP (ref 0–0)
NRBC # BLD: 0 /100 WBCS — SIGNIFICANT CHANGE UP (ref 0–0)
PLATELET # BLD AUTO: 199 K/UL — SIGNIFICANT CHANGE UP (ref 130–400)
PLATELET # BLD AUTO: 199 K/UL — SIGNIFICANT CHANGE UP (ref 130–400)
PMV BLD: 10.4 FL — SIGNIFICANT CHANGE UP (ref 7.4–10.4)
PMV BLD: 10.4 FL — SIGNIFICANT CHANGE UP (ref 7.4–10.4)
POTASSIUM SERPL-MCNC: 4.4 MMOL/L — SIGNIFICANT CHANGE UP (ref 3.5–5)
POTASSIUM SERPL-MCNC: 4.4 MMOL/L — SIGNIFICANT CHANGE UP (ref 3.5–5)
POTASSIUM SERPL-SCNC: 4.4 MMOL/L — SIGNIFICANT CHANGE UP (ref 3.5–5)
POTASSIUM SERPL-SCNC: 4.4 MMOL/L — SIGNIFICANT CHANGE UP (ref 3.5–5)
RBC # BLD: 3.34 M/UL — LOW (ref 4.7–6.1)
RBC # BLD: 3.34 M/UL — LOW (ref 4.7–6.1)
RBC # FLD: 15 % — HIGH (ref 11.5–14.5)
RBC # FLD: 15 % — HIGH (ref 11.5–14.5)
SODIUM SERPL-SCNC: 144 MMOL/L — SIGNIFICANT CHANGE UP (ref 135–146)
SODIUM SERPL-SCNC: 144 MMOL/L — SIGNIFICANT CHANGE UP (ref 135–146)
WBC # BLD: 7.16 K/UL — SIGNIFICANT CHANGE UP (ref 4.8–10.8)
WBC # BLD: 7.16 K/UL — SIGNIFICANT CHANGE UP (ref 4.8–10.8)
WBC # FLD AUTO: 7.16 K/UL — SIGNIFICANT CHANGE UP (ref 4.8–10.8)
WBC # FLD AUTO: 7.16 K/UL — SIGNIFICANT CHANGE UP (ref 4.8–10.8)

## 2023-11-23 PROCEDURE — 99232 SBSQ HOSP IP/OBS MODERATE 35: CPT

## 2023-11-23 PROCEDURE — 93306 TTE W/DOPPLER COMPLETE: CPT | Mod: 26

## 2023-11-23 RX ORDER — AMLODIPINE BESYLATE 2.5 MG/1
10 TABLET ORAL DAILY
Refills: 0 | Status: DISCONTINUED | OUTPATIENT
Start: 2023-11-24 | End: 2023-11-25

## 2023-11-23 RX ORDER — ACETAMINOPHEN 500 MG
650 TABLET ORAL EVERY 6 HOURS
Refills: 0 | Status: DISCONTINUED | OUTPATIENT
Start: 2023-11-23 | End: 2023-11-25

## 2023-11-23 RX ADMIN — CHLORHEXIDINE GLUCONATE 1 APPLICATION(S): 213 SOLUTION TOPICAL at 05:32

## 2023-11-23 RX ADMIN — GABAPENTIN 300 MILLIGRAM(S): 400 CAPSULE ORAL at 05:29

## 2023-11-23 RX ADMIN — GABAPENTIN 300 MILLIGRAM(S): 400 CAPSULE ORAL at 17:26

## 2023-11-23 RX ADMIN — Medication 4 UNIT(S): at 17:25

## 2023-11-23 RX ADMIN — Medication 2: at 14:31

## 2023-11-23 RX ADMIN — Medication 4 UNIT(S): at 14:32

## 2023-11-23 RX ADMIN — ATORVASTATIN CALCIUM 40 MILLIGRAM(S): 80 TABLET, FILM COATED ORAL at 21:56

## 2023-11-23 RX ADMIN — Medication 1 TABLET(S): at 11:34

## 2023-11-23 RX ADMIN — INSULIN GLARGINE 12 UNIT(S): 100 INJECTION, SOLUTION SUBCUTANEOUS at 21:56

## 2023-11-23 RX ADMIN — HEPARIN SODIUM 1900 UNIT(S)/HR: 5000 INJECTION INTRAVENOUS; SUBCUTANEOUS at 00:04

## 2023-11-23 RX ADMIN — AMLODIPINE BESYLATE 5 MILLIGRAM(S): 2.5 TABLET ORAL at 05:29

## 2023-11-23 RX ADMIN — PREGABALIN 1000 MICROGRAM(S): 225 CAPSULE ORAL at 11:34

## 2023-11-23 RX ADMIN — Medication 81 MILLIGRAM(S): at 11:33

## 2023-11-23 RX ADMIN — CLOPIDOGREL BISULFATE 75 MILLIGRAM(S): 75 TABLET, FILM COATED ORAL at 11:33

## 2023-11-23 RX ADMIN — HEPARIN SODIUM 1900 UNIT(S)/HR: 5000 INJECTION INTRAVENOUS; SUBCUTANEOUS at 11:33

## 2023-11-23 RX ADMIN — PIPERACILLIN AND TAZOBACTAM 25 GRAM(S): 4; .5 INJECTION, POWDER, LYOPHILIZED, FOR SOLUTION INTRAVENOUS at 05:29

## 2023-11-23 NOTE — PROGRESS NOTE ADULT - SUBJECTIVE AND OBJECTIVE BOX
Denies any chest pain, SOB or palpitations   Denies any pain in his right foot  Still on heparin drip awaiting possible intervention by Podiatry     MEDICATIONS  (STANDING):  aspirin enteric coated 81 milliGRAM(s) Oral daily  atorvastatin 40 milliGRAM(s) Oral at bedtime  chlorhexidine 2% Cloths 1 Application(s) Topical <User Schedule>  clopidogrel Tablet 75 milliGRAM(s) Oral daily  cyanocobalamin 1000 MICROGram(s) Oral daily  dextrose 5%. 1000 milliLiter(s) (50 mL/Hr) IV Continuous <Continuous>  dextrose 5%. 1000 milliLiter(s) (100 mL/Hr) IV Continuous <Continuous>  dextrose 50% Injectable 25 Gram(s) IV Push once  dextrose 50% Injectable 12.5 Gram(s) IV Push once  dextrose 50% Injectable 25 Gram(s) IV Push once  gabapentin 300 milliGRAM(s) Oral two times a day  glucagon  Injectable 1 milliGRAM(s) IntraMuscular once  heparin  Infusion.  Unit(s)/Hr (15 mL/Hr) IV Continuous <Continuous>  influenza   Vaccine 0.5 milliLiter(s) IntraMuscular once  insulin glargine Injectable (LANTUS) 12 Unit(s) SubCutaneous at bedtime  insulin lispro (ADMELOG) corrective regimen sliding scale   SubCutaneous three times a day before meals  insulin lispro (ADMELOG) corrective regimen sliding scale   SubCutaneous at bedtime  insulin lispro Injectable (ADMELOG) 4 Unit(s) SubCutaneous three times a day before meals  lactobacillus acidophilus 1 Tablet(s) Oral once  Nephro-danette 1 Tablet(s) Oral daily    MEDICATIONS  (PRN):  acetaminophen     Tablet .. 650 milliGRAM(s) Oral every 6 hours PRN Mild Pain (1 - 3), Moderate Pain (4 - 6)  dextrose Oral Gel 15 Gram(s) Oral once PRN Blood Glucose LESS THAN 70 milliGRAM(s)/deciliter  heparin   Injectable 3000 Unit(s) IV Push every 6 hours PRN For aPTT between 40 - 57  heparin   Injectable 6500 Unit(s) IV Push every 6 hours PRN For aPTT less than 40  melatonin 3 milliGRAM(s) Oral at bedtime PRN Insomnia            Vital Signs Last 24 Hrs  T(C): 36.8 (23 Nov 2023 15:36), Max: 37.1 (22 Nov 2023 23:11)  T(F): 98.3 (23 Nov 2023 15:36), Max: 98.8 (22 Nov 2023 23:11)  HR: 96 (23 Nov 2023 20:51) (88 - 101)  BP: 163/73 (23 Nov 2023 20:51) (107/54 - 163/73)  BP(mean): 105 (23 Nov 2023 20:51) (75 - 105)  RR: 17 (23 Nov 2023 20:51) (17 - 18)  SpO2: 96% (23 Nov 2023 20:51) (93% - 98%)    Parameters below as of 23 Nov 2023 20:51  Patient On (Oxygen Delivery Method): room air         REVIEW OF SYSTEMS:  CONSTITUTIONAL: No fever, weight loss, or fatigue  CARDIOLOGY: Patient denies chest pain, shortness of breath or syncopal episodes.   RESPIRATORY: denies shortness of breath, wheezeing.   NEUROLOGICAL: NO weakness, no focal deficits to report.  GI: no BRBPR, no N,V,diarrhea.    PSYCHIATRY: normal mood and affect  HEENT: no nasal discharge, no ecchymosis  SKIN: no ecchymosis, no breakdown  MUSCULOSKELETAL: Full range of motion x4.        PHYSICAL EXAM:  · CONSTITUTIONAL:	Well-developed, well nourished     ·RESPIRATORY:   airway patent; breath sounds equal; good air movement; respirations non-labored; clear to auscultation bilaterally  · CARDIOVASCULAR	regular rate and rhythm  no rub  + SE murmur  normal PMI  · EXTREMITIES: No cyanosis, clubbing or edema  · VASCULAR: Right foot covered   	  TELEMETRY: Sinus rhythm                           9.2    7.16  )-----------( 199      ( 23 Nov 2023 05:57 )             29.9     11-23    144  |  102  |  45<H>  ----------------------------<  123<H>  4.4   |  25  |  7.3<HH>    Ca    8.8      23 Nov 2023 05:57  Mg     1.9     11-23          PTT - ( 22 Nov 2023 23:15 )  PTT:67.2 sec    I&O's Summary    22 Nov 2023 07:01  -  23 Nov 2023 07:00  --------------------------------------------------------  IN: 697 mL / OUT: 0 mL / NET: 697 mL    23 Nov 2023 07:01  -  23 Nov 2023 21:48  --------------------------------------------------------  IN: 428 mL / OUT: 0 mL / NET: 428 mL      BNP  RADIOLOGY & ADDITIONAL STUDIES:    IMPRESSION AND PLAN:      Continue current care   Podiatry F/U  If no intervention planned restart Eliquis 2.5 mg bid   DVT/GI prophylaxis   Physical therapy/Rehab   Will F/U

## 2023-11-23 NOTE — PROGRESS NOTE ADULT - SUBJECTIVE AND OBJECTIVE BOX
Nephrology progress note    Patient was seen and examined, events over the last 24 h noted .    HD     Allergies:  Azactam (Hives; Rash)    Hospital Medications:   MEDICATIONS  (STANDING):  aspirin enteric coated 81 milliGRAM(s) Oral daily  atorvastatin 40 milliGRAM(s) Oral at bedtime  chlorhexidine 2% Cloths 1 Application(s) Topical <User Schedule>  clopidogrel Tablet 75 milliGRAM(s) Oral daily  cyanocobalamin 1000 MICROGram(s) Oral daily  dextrose 5%. 1000 milliLiter(s) (50 mL/Hr) IV Continuous <Continuous>  dextrose 5%. 1000 milliLiter(s) (100 mL/Hr) IV Continuous <Continuous>  dextrose 50% Injectable 25 Gram(s) IV Push once  dextrose 50% Injectable 25 Gram(s) IV Push once  dextrose 50% Injectable 12.5 Gram(s) IV Push once  gabapentin 300 milliGRAM(s) Oral two times a day  glucagon  Injectable 1 milliGRAM(s) IntraMuscular once  heparin  Infusion.  Unit(s)/Hr (15 mL/Hr) IV Continuous <Continuous>  influenza   Vaccine 0.5 milliLiter(s) IntraMuscular once  insulin glargine Injectable (LANTUS) 12 Unit(s) SubCutaneous at bedtime  insulin lispro (ADMELOG) corrective regimen sliding scale   SubCutaneous three times a day before meals  insulin lispro (ADMELOG) corrective regimen sliding scale   SubCutaneous at bedtime  insulin lispro Injectable (ADMELOG) 4 Unit(s) SubCutaneous three times a day before meals  lactobacillus acidophilus 1 Tablet(s) Oral once  Nephro-danette 1 Tablet(s) Oral daily        VITALS:  T(F): 98.6 (11-23-23 @ 08:20), Max: 98.8 (11-22-23 @ 23:11)  HR: 88 (11-23-23 @ 08:20)  BP: 146/70 (11-23-23 @ 08:20)  RR: 18 (11-23-23 @ 08:20)  SpO2: 93% (11-23-23 @ 03:59)  Wt(kg): --    11-21 @ 07:01  -  11-22 @ 07:00  --------------------------------------------------------  IN: 981 mL / OUT: 300 mL / NET: 681 mL    11-22 @ 07:01 - 11-23 @ 07:00  --------------------------------------------------------  IN: 697 mL / OUT: 0 mL / NET: 697 mL    11-23 @ 07:01 - 11-23 @ 12:45  --------------------------------------------------------  IN: 295 mL / OUT: 0 mL / NET: 295 mL          PHYSICAL EXAM:  Constitutional: NAD  HEENT: anicteric sclera, oropharynx clear, MMM  Neck: No JVD  Respiratory: CTAB, no wheezes, rales or rhonchi  Cardiovascular: S1, S2, RRR  Gastrointestinal: BS+, soft, NT/ND  Extremities: No cyanosis or clubbing. No peripheral edema  :  No callahan.   Skin: No rashes    LABS:  11-23    144  |  102  |  45<H>  ----------------------------<  123<H>  4.4   |  25  |  7.3<HH>    Ca    8.8      23 Nov 2023 05:57  Mg     1.9     11-23                            9.2    7.16  )-----------( 199      ( 23 Nov 2023 05:57 )             29.9       Urine Studies:  Urinalysis Basic - ( 23 Nov 2023 05:57 )    Color:  / Appearance:  / SG:  / pH:   Gluc: 123 mg/dL / Ketone:   / Bili:  / Urobili:    Blood:  / Protein:  / Nitrite:    Leuk Esterase:  / RBC:  / WBC    Sq Epi:  / Non Sq Epi:  / Bacteria:         RADIOLOGY & ADDITIONAL STUDIES:

## 2023-11-23 NOTE — PROGRESS NOTE ADULT - ASSESSMENT
ESRD on HD TTS in [place]  R foot gangrene  HTN  Normocytic anemia  - HD  yesterday, next Saturday  - renal diet  - check phos level   - monitor h/h   - BP controlled  - dose abx for iHD, on zosyn  followed by vascular/podiatry / sp PTA right CFA    will follow

## 2023-11-23 NOTE — PROGRESS NOTE ADULT - SUBJECTIVE AND OBJECTIVE BOX
Chief complaint: Patient is a 58y old  Male who presents with a chief complaint of Gangrenous Right 3rd toe (22 Nov 2023 19:18)    Interval history:    Review of systems: A complete 10-point review of systems was obtained and is negative except as stated in the interval history.    Vitals:  T(F): 98.6, Max: 98.8 (11-22 @ 23:11)  HR: 88 (88 - 103)  BP: 146/70 (107/54 - 163/77)  RR: 18 (18 - 18)  SpO2: 93% (93% - 98%)    Ins & outs:     11-20 @ 07:01  -  11-21 @ 07:00  --------------------------------------------------------  IN: 0 mL / OUT: 2000 mL / NET: -2000 mL    11-21 @ 07:01 - 11-22 @ 07:00  --------------------------------------------------------  IN: 981 mL / OUT: 300 mL / NET: 681 mL    11-22 @ 07:01  -  11-23 @ 07:00  --------------------------------------------------------  IN: 697 mL / OUT: 0 mL / NET: 697 mL    11-23 @ 07:01  -  11-23 @ 12:25  --------------------------------------------------------  IN: 295 mL / OUT: 0 mL / NET: 295 mL      Weight trend:      Physical exam:  General: No apparent distress  HEENT: Anicteric sclera. Moist mucous membranes. JVP *** cm.   Cardiac: Regular rate and rhythm. No murmurs, rubs, or gallops.   Vascular: Symmetric radial pulses. Dorsalis pedis pulses palpable.   Respiratory: Normal effort. Bibasilar crackles. Clear to ascultation.   Abdomen: Soft, nontender. Audible bowel sounds.   Extremities: Warm with *** edema. No cyanosis or clubbing.   Skin: Warm and dry. No rash.   Neurologic: Grossly normal motor function.   Psychiatric: Oriented to person, place, and time.     Data reviewed:  - Telemetry:   - ECG (date***):   - TTE (date***):   - Chest x-ray (date***):   - Stress test:   - CCTA:  - Cardiac catheterization:  - Cardiac MRI:    - Labs:                        9.2    7.16  )-----------( 199      ( 23 Nov 2023 05:57 )             29.9     11-23    144  |  102  |  45<H>  ----------------------------<  123<H>  4.4   |  25  |  7.3<HH>    Ca    8.8      23 Nov 2023 05:57  Mg     1.9     11-23      PTT - ( 22 Nov 2023 23:15 )  PTT:67.2 sec            Urinalysis Basic - ( 23 Nov 2023 05:57 )    Color: x / Appearance: x / SG: x / pH: x  Gluc: 123 mg/dL / Ketone: x  / Bili: x / Urobili: x   Blood: x / Protein: x / Nitrite: x   Leuk Esterase: x / RBC: x / WBC x   Sq Epi: x / Non Sq Epi: x / Bacteria: x        Medications:  aspirin enteric coated 81 milliGRAM(s) Oral daily  atorvastatin 40 milliGRAM(s) Oral at bedtime  chlorhexidine 2% Cloths 1 Application(s) Topical <User Schedule>  clopidogrel Tablet 75 milliGRAM(s) Oral daily  cyanocobalamin 1000 MICROGram(s) Oral daily  dextrose 50% Injectable 25 Gram(s) IV Push once  dextrose 50% Injectable 12.5 Gram(s) IV Push once  dextrose 50% Injectable 25 Gram(s) IV Push once  gabapentin 300 milliGRAM(s) Oral two times a day  glucagon  Injectable 1 milliGRAM(s) IntraMuscular once  influenza   Vaccine 0.5 milliLiter(s) IntraMuscular once  insulin glargine Injectable (LANTUS) 12 Unit(s) SubCutaneous at bedtime  insulin lispro (ADMELOG) corrective regimen sliding scale   SubCutaneous three times a day before meals  insulin lispro (ADMELOG) corrective regimen sliding scale   SubCutaneous at bedtime  insulin lispro Injectable (ADMELOG) 4 Unit(s) SubCutaneous three times a day before meals  lactobacillus acidophilus 1 Tablet(s) Oral once  Nephro-danette 1 Tablet(s) Oral daily    Drips:  dextrose 5%. 1000 milliLiter(s) (50 mL/Hr) IV Continuous <Continuous>  dextrose 5%. 1000 milliLiter(s) (100 mL/Hr) IV Continuous <Continuous>  heparin  Infusion.  Unit(s)/Hr (15 mL/Hr) IV Continuous <Continuous>    PRN:     Allergies    Azactam (Hives; Rash)    Intolerances   Chief complaint: Patient is a 58y old  Male who presents with a chief complaint of Gangrenous Right 3rd toe (22 Nov 2023 19:18)    Interval history:  Seen and examined patient at bedside this AM.   Pt remains on heparin gtt.   Denies CP, SOB, pain, fevers/chills. No acute overnight events.     Review of systems: A complete 10-point review of systems was obtained and is negative except as stated in the interval history.    Vitals:  T(F): 98.6, Max: 98.8 (11-22 @ 23:11)  HR: 88 (88 - 103)  BP: 146/70 (107/54 - 163/77)  RR: 18 (18 - 18)  SpO2: 93% (93% - 98%)    Ins & outs:     11-20 @ 07:01  -  11-21 @ 07:00  --------------------------------------------------------  IN: 0 mL / OUT: 2000 mL / NET: -2000 mL    11-21 @ 07:01  -  11-22 @ 07:00  --------------------------------------------------------  IN: 981 mL / OUT: 300 mL / NET: 681 mL    11-22 @ 07:01  -  11-23 @ 07:00  --------------------------------------------------------  IN: 697 mL / OUT: 0 mL / NET: 697 mL    11-23 @ 07:01  -  11-23 @ 12:25  --------------------------------------------------------  IN: 295 mL / OUT: 0 mL / NET: 295 mL      Weight trend:    Physical exam:  General: No apparent distress  HEENT: Anicteric sclera. Moist mucous membranes. JVD-  Cardiac: Regular rate and rhythm. No murmurs, rubs, or gallops.   Vascular: Symmetric radial pulses. Dorsalis pedis pulses palpable.   Respiratory: Normal effort. B/L BS Clear to ascultation.   Abdomen: Soft, nontender. Audible bowel sounds.   Extremities: Warm with no edema. No cyanosis or clubbing.   Skin: Warm and dry. No rash.   Neurologic: Grossly normal motor function.   Psychiatric: Oriented to person, place, and time.     Data reviewed:  - Telemetry: SR/ST  bpm    - ECG  (11.08.21)    Diagnosis Line Normal sinus rhythm  Cannot rule out Anterior infarct , age undetermined  Abnormal ECG    - TTE (11/23/23):  < from: TTE Echo Complete w/o Contrast w/ Doppler (11.23.23 @ 09:43) >  Summary:   1. Left ventricular ejection fraction, by visual estimation, is 55 to   60%.   2. Normal global left ventricular systolic function.   3. Normal left atrial size.   4. Normal right atrial size.   5. Trace mitral valve regurgitation.   6. Mild pulmonic valve regurgitation.      -VA Duplex Lower Extrem Arterial, Bilat (11.15.23)    Impression:  High-grade stenosis of the right common femoral artery. Moderate stenosis   of the right popliteal artery.    Normal arterial flow in theleft lower extremity.      Peripheral Angio (11/22/23):  ACCESS: Ultrasound Guided  left femoral artery access 6fr    VASCULAR CLOSURE DEVICE (if applicable): manual hold    ANGIOGRAM:  Selective Abdominal Aorta, Right Iliac, Right SFA, Left Iliac, Left SFA, Right and Left Lower Extremity DSA angiography     DETAILED PROCEDURE SUMMARY:  After obtaining informed consent, the patient was brought to the catheterization suite in a post- absorptive and non-sedated state. After this, a timeout was performed and I administered moderate sedation throughout this 45-minute procedure. An independent trained observer pushed medications at my direction, and monitored the patient’s level of consciousness and physiological status throughout. The patient was prepped and draped in the usual sterile manner. 1% lidocaine was used for local anesthesia and the patient was sedated as per endovascular lab protocol. Access was obtained in the  Femoral Artery using the modified Seldinger technique 5/6/7 [place] Sheath was placed in the artery under fluoroscopy and ultrasound guidance which was utilized for assessment of arterial patency and actual real-time visualization of needle passage to the arterial lumen. The sheath was exchanged for 6 Fr 45 cm destination sheath prior to the interventional part of the procedure.    No recent Stress test, CCTA, Cardiac catheterization or Cardiac MRI.      - Labs:                        9.2    7.16  )-----------( 199      ( 23 Nov 2023 05:57 )             29.9     11-23    144  |  102  |  45<H>  ----------------------------<  123<H>  4.4   |  25  |  7.3<HH>    Ca    8.8      23 Nov 2023 05:57  Mg     1.9     11-23      PTT - ( 22 Nov 2023 23:15 )  PTT:67.2 sec      Urinalysis Basic - ( 23 Nov 2023 05:57 )    Color: x / Appearance: x / SG: x / pH: x  Gluc: 123 mg/dL / Ketone: x  / Bili: x / Urobili: x   Blood: x / Protein: x / Nitrite: x   Leuk Esterase: x / RBC: x / WBC x   Sq Epi: x / Non Sq Epi: x / Bacteria: x        Medications:  aspirin enteric coated 81 milliGRAM(s) Oral daily  atorvastatin 40 milliGRAM(s) Oral at bedtime  chlorhexidine 2% Cloths 1 Application(s) Topical <User Schedule>  clopidogrel Tablet 75 milliGRAM(s) Oral daily  cyanocobalamin 1000 MICROGram(s) Oral daily  dextrose 50% Injectable 25 Gram(s) IV Push once  dextrose 50% Injectable 12.5 Gram(s) IV Push once  dextrose 50% Injectable 25 Gram(s) IV Push once  gabapentin 300 milliGRAM(s) Oral two times a day  glucagon  Injectable 1 milliGRAM(s) IntraMuscular once  influenza   Vaccine 0.5 milliLiter(s) IntraMuscular once  insulin glargine Injectable (LANTUS) 12 Unit(s) SubCutaneous at bedtime  insulin lispro (ADMELOG) corrective regimen sliding scale   SubCutaneous three times a day before meals  insulin lispro (ADMELOG) corrective regimen sliding scale   SubCutaneous at bedtime  insulin lispro Injectable (ADMELOG) 4 Unit(s) SubCutaneous three times a day before meals  lactobacillus acidophilus 1 Tablet(s) Oral once  Nephro-danette 1 Tablet(s) Oral daily    Drips:  dextrose 5%. 1000 milliLiter(s) (50 mL/Hr) IV Continuous <Continuous>  dextrose 5%. 1000 milliLiter(s) (100 mL/Hr) IV Continuous <Continuous>  heparin  Infusion.  Unit(s)/Hr (15 mL/Hr) IV Continuous <Continuous>    PRN:     Allergies    Azactam (Hives; Rash)    Intolerances

## 2023-11-23 NOTE — PROGRESS NOTE ADULT - NS ATTEND AMEND GEN_ALL_CORE FT
58yoM with CAD. PVD, IDDM, ESRD (HD on TThSat), bilateral lower extremity DVTs on Eliquis, and obesity who was sent in by his podiatrist Dr. Dudley due to gangrenous right 3rd and 4th toes. Patient underwent PTA to the right CFA on 11/20. He is awaiting surgical intervention with Podiatry for toe amputation next week.    Patient states he has not met a Podiatrist. I called [phone] with no answer today. Will attempt to reach out again tomorrow, as the patient would like to speak with the Podiatry team directly.     Plan:   - Increase amlodipine to 10 mg daily   - Lab holiday on Friday 11/24 and Sat 11/25  - Awaiting toe amputations  - Will stay on heparin gtt, plan to resume Eliquis after surgery

## 2023-11-23 NOTE — PROGRESS NOTE ADULT - ASSESSMENT
Assessment:  58 year old male with past medical history of Type 2 diabetes (now treated with Insulin), Bilateral lower extremity DVT (on Eliquis),  ESRD (HD on Tues/Thurs/Sat), CAD, morbid obesity, PVD, who now presents in the ER sent in by his podiatrist Dr. Dudley due to gangrenous Right 3rd and 4th toes. Patient underwent PTA to the right CFA on 11/20. Patient waiting for surgical intervention for toe amputation next week.    Plan:  #Infected Gangrenous Right 3rd and 4th toes with Osteomyelitis  #PVD  #b/l LE DVT  - VA duplex on admission showed High-grade stenosis of the right common femoral artery. Moderate stenosis of the right popliteal artery. Normal arterial flow in the left lower extremity  - Pt underwent LE angiogram is s/p successful PTA to right CFA on 11/20.   - Pt started on ASA, Plavix and continued on Heparin; monitor ptt;   - Eliquis held 11/18; was on Heparin ggt before the LE angiogram and will remain on heparin for now (will restart Eliquis 2.5 after amputation)  - Amputation of gangrenous toes right foot early next week   - ID following: c/w Zosyn    #HTN  - Patient hypertensive  - F/u BP after HD  - Continue amlodipine 5mg    #CAD  - S/p peripheral angio  - C/w atorvastatin 40mg qhs  - C/w ASA and Plavix    #Type 2 diabetes  - Monitor FS  - Carb consistent diet   - Lantus  - Lispro TID   - ISS    #ESRD (HD)   #Hyperkalemia  - Monitor K  - HD today 3 hrs 2 K bath BF 4540 UF 2.5 L  - Nephro following     #MISC  - DVT: heparin  - GI: none  - Diet: renal diet    Dispo: active      [id number] Assessment:  58 year old male with past medical history of Type 2 diabetes (now treated with Insulin), Bilateral lower extremity DVT (on Eliquis),  ESRD (HD on Tues/Thurs/Sat), CAD, morbid obesity, PVD, who now presents in the ER sent in by his podiatrist Dr. Dudley due to gangrenous Right 3rd and 4th toes. Patient underwent PTA to the right CFA on 11/20. Patient waiting for surgical intervention for toe amputation next week.    Plan:  #Infected Gangrenous Right 3rd and 4th toes with Osteomyelitis  #PVD  #b/l LE DVT  - VA duplex on admission showed High-grade stenosis of the right common femoral artery. Moderate stenosis of the right popliteal artery. Normal arterial flow in the left lower extremity  - Pt underwent LE angiogram is s/p successful PTA to right CFA on 11/20.   - Pt started on ASA, Plavix and continued on Heparin; monitor ptt;   - Eliquis held 11/18; was on Heparin gtt before the LE angiogram and will remain on heparin for now (will restart Eliquis 2.5 after amputation)  - s/p IV Zosyn abx course   - Amputation of gangrenous toes of the R foot early next week   - Podiatry following  - ID following    #HTN  - Increased amlodipine to 10 mg (from 5 mg) daily for better BP control     #CAD, PVD  - S/p peripheral angio  - C/w atorvastatin 40mg qhs  - C/w ASA and Plavix    #Type 2 diabetes  - Monitor FS  - Carb consistent diet   - Lantus  - Lispro TID   - ISS    #ESRD (HD)   #Hyperkalemia  - Monitor K  - HD today 3 hrs 2 K bath BF 4540 UF 2.5 L  - Nephro following     #MISC  - DVT: heparin gtt   - GI: none  - Diet: renal diet      [id number]

## 2023-11-24 LAB
ANION GAP SERPL CALC-SCNC: 16 MMOL/L — HIGH (ref 7–14)
ANION GAP SERPL CALC-SCNC: 16 MMOL/L — HIGH (ref 7–14)
APTT BLD: 58.2 SEC — HIGH (ref 27–39.2)
APTT BLD: 58.2 SEC — HIGH (ref 27–39.2)
BUN SERPL-MCNC: 55 MG/DL — HIGH (ref 10–20)
BUN SERPL-MCNC: 55 MG/DL — HIGH (ref 10–20)
CALCIUM SERPL-MCNC: 9.3 MG/DL — SIGNIFICANT CHANGE UP (ref 8.4–10.4)
CALCIUM SERPL-MCNC: 9.3 MG/DL — SIGNIFICANT CHANGE UP (ref 8.4–10.4)
CHLORIDE SERPL-SCNC: 97 MMOL/L — LOW (ref 98–110)
CHLORIDE SERPL-SCNC: 97 MMOL/L — LOW (ref 98–110)
CO2 SERPL-SCNC: 25 MMOL/L — SIGNIFICANT CHANGE UP (ref 17–32)
CO2 SERPL-SCNC: 25 MMOL/L — SIGNIFICANT CHANGE UP (ref 17–32)
CREAT SERPL-MCNC: 9.3 MG/DL — CRITICAL HIGH (ref 0.7–1.5)
CREAT SERPL-MCNC: 9.3 MG/DL — CRITICAL HIGH (ref 0.7–1.5)
EGFR: 6 ML/MIN/1.73M2 — LOW
EGFR: 6 ML/MIN/1.73M2 — LOW
GLUCOSE BLDC GLUCOMTR-MCNC: 111 MG/DL — HIGH (ref 70–99)
GLUCOSE BLDC GLUCOMTR-MCNC: 111 MG/DL — HIGH (ref 70–99)
GLUCOSE BLDC GLUCOMTR-MCNC: 138 MG/DL — HIGH (ref 70–99)
GLUCOSE BLDC GLUCOMTR-MCNC: 138 MG/DL — HIGH (ref 70–99)
GLUCOSE BLDC GLUCOMTR-MCNC: 166 MG/DL — HIGH (ref 70–99)
GLUCOSE BLDC GLUCOMTR-MCNC: 166 MG/DL — HIGH (ref 70–99)
GLUCOSE BLDC GLUCOMTR-MCNC: 225 MG/DL — HIGH (ref 70–99)
GLUCOSE BLDC GLUCOMTR-MCNC: 225 MG/DL — HIGH (ref 70–99)
GLUCOSE SERPL-MCNC: 148 MG/DL — HIGH (ref 70–99)
GLUCOSE SERPL-MCNC: 148 MG/DL — HIGH (ref 70–99)
HCT VFR BLD CALC: 31.1 % — LOW (ref 42–52)
HCT VFR BLD CALC: 31.1 % — LOW (ref 42–52)
HGB BLD-MCNC: 9.6 G/DL — LOW (ref 14–18)
HGB BLD-MCNC: 9.6 G/DL — LOW (ref 14–18)
MAGNESIUM SERPL-MCNC: 2 MG/DL — SIGNIFICANT CHANGE UP (ref 1.8–2.4)
MAGNESIUM SERPL-MCNC: 2 MG/DL — SIGNIFICANT CHANGE UP (ref 1.8–2.4)
MCHC RBC-ENTMCNC: 27.9 PG — SIGNIFICANT CHANGE UP (ref 27–31)
MCHC RBC-ENTMCNC: 27.9 PG — SIGNIFICANT CHANGE UP (ref 27–31)
MCHC RBC-ENTMCNC: 30.9 G/DL — LOW (ref 32–37)
MCHC RBC-ENTMCNC: 30.9 G/DL — LOW (ref 32–37)
MCV RBC AUTO: 90.4 FL — SIGNIFICANT CHANGE UP (ref 80–94)
MCV RBC AUTO: 90.4 FL — SIGNIFICANT CHANGE UP (ref 80–94)
NRBC # BLD: 0 /100 WBCS — SIGNIFICANT CHANGE UP (ref 0–0)
NRBC # BLD: 0 /100 WBCS — SIGNIFICANT CHANGE UP (ref 0–0)
PHOSPHATE SERPL-MCNC: 8.6 MG/DL — HIGH (ref 2.1–4.9)
PHOSPHATE SERPL-MCNC: 8.6 MG/DL — HIGH (ref 2.1–4.9)
PLATELET # BLD AUTO: 199 K/UL — SIGNIFICANT CHANGE UP (ref 130–400)
PLATELET # BLD AUTO: 199 K/UL — SIGNIFICANT CHANGE UP (ref 130–400)
PMV BLD: 10.8 FL — HIGH (ref 7.4–10.4)
PMV BLD: 10.8 FL — HIGH (ref 7.4–10.4)
POTASSIUM SERPL-MCNC: 4.9 MMOL/L — SIGNIFICANT CHANGE UP (ref 3.5–5)
POTASSIUM SERPL-MCNC: 4.9 MMOL/L — SIGNIFICANT CHANGE UP (ref 3.5–5)
POTASSIUM SERPL-SCNC: 4.9 MMOL/L — SIGNIFICANT CHANGE UP (ref 3.5–5)
POTASSIUM SERPL-SCNC: 4.9 MMOL/L — SIGNIFICANT CHANGE UP (ref 3.5–5)
RBC # BLD: 3.44 M/UL — LOW (ref 4.7–6.1)
RBC # BLD: 3.44 M/UL — LOW (ref 4.7–6.1)
RBC # FLD: 14.8 % — HIGH (ref 11.5–14.5)
RBC # FLD: 14.8 % — HIGH (ref 11.5–14.5)
SODIUM SERPL-SCNC: 138 MMOL/L — SIGNIFICANT CHANGE UP (ref 135–146)
SODIUM SERPL-SCNC: 138 MMOL/L — SIGNIFICANT CHANGE UP (ref 135–146)
WBC # BLD: 8.51 K/UL — SIGNIFICANT CHANGE UP (ref 4.8–10.8)
WBC # BLD: 8.51 K/UL — SIGNIFICANT CHANGE UP (ref 4.8–10.8)
WBC # FLD AUTO: 8.51 K/UL — SIGNIFICANT CHANGE UP (ref 4.8–10.8)
WBC # FLD AUTO: 8.51 K/UL — SIGNIFICANT CHANGE UP (ref 4.8–10.8)

## 2023-11-24 PROCEDURE — 99239 HOSP IP/OBS DSCHRG MGMT >30: CPT

## 2023-11-24 RX ORDER — APIXABAN 2.5 MG/1
1 TABLET, FILM COATED ORAL
Qty: 0 | Refills: 0 | DISCHARGE

## 2023-11-24 RX ORDER — ASPIRIN/CALCIUM CARB/MAGNESIUM 324 MG
1 TABLET ORAL
Qty: 30 | Refills: 3
Start: 2023-11-24 | End: 2024-03-22

## 2023-11-24 RX ORDER — APIXABAN 2.5 MG/1
1 TABLET, FILM COATED ORAL
Qty: 60 | Refills: 0
Start: 2023-11-24 | End: 2023-12-23

## 2023-11-24 RX ORDER — GABAPENTIN 400 MG/1
1 CAPSULE ORAL
Qty: 60 | Refills: 0
Start: 2023-11-24 | End: 2023-12-23

## 2023-11-24 RX ORDER — AMLODIPINE BESYLATE 2.5 MG/1
1 TABLET ORAL
Qty: 30 | Refills: 0
Start: 2023-11-24 | End: 2023-12-23

## 2023-11-24 RX ORDER — ATORVASTATIN CALCIUM 80 MG/1
1 TABLET, FILM COATED ORAL
Qty: 30 | Refills: 0
Start: 2023-11-24 | End: 2023-12-23

## 2023-11-24 RX ORDER — CLOPIDOGREL BISULFATE 75 MG/1
1 TABLET, FILM COATED ORAL
Qty: 30 | Refills: 0
Start: 2023-11-24 | End: 2023-12-23

## 2023-11-24 RX ORDER — GABAPENTIN 400 MG/1
1 CAPSULE ORAL
Qty: 0 | Refills: 0 | DISCHARGE

## 2023-11-24 RX ADMIN — Medication 4 UNIT(S): at 12:17

## 2023-11-24 RX ADMIN — ATORVASTATIN CALCIUM 40 MILLIGRAM(S): 80 TABLET, FILM COATED ORAL at 21:48

## 2023-11-24 RX ADMIN — AMLODIPINE BESYLATE 10 MILLIGRAM(S): 2.5 TABLET ORAL at 05:44

## 2023-11-24 RX ADMIN — GABAPENTIN 300 MILLIGRAM(S): 400 CAPSULE ORAL at 17:12

## 2023-11-24 RX ADMIN — HEPARIN SODIUM 1900 UNIT(S)/HR: 5000 INJECTION INTRAVENOUS; SUBCUTANEOUS at 08:23

## 2023-11-24 RX ADMIN — INSULIN GLARGINE 12 UNIT(S): 100 INJECTION, SOLUTION SUBCUTANEOUS at 21:48

## 2023-11-24 RX ADMIN — Medication 81 MILLIGRAM(S): at 12:14

## 2023-11-24 RX ADMIN — CLOPIDOGREL BISULFATE 75 MILLIGRAM(S): 75 TABLET, FILM COATED ORAL at 12:14

## 2023-11-24 RX ADMIN — Medication 1 TABLET(S): at 12:18

## 2023-11-24 RX ADMIN — Medication 30 MILLILITER(S): at 12:14

## 2023-11-24 RX ADMIN — CHLORHEXIDINE GLUCONATE 1 APPLICATION(S): 213 SOLUTION TOPICAL at 05:45

## 2023-11-24 RX ADMIN — Medication 4 UNIT(S): at 08:21

## 2023-11-24 RX ADMIN — Medication 2: at 12:17

## 2023-11-24 RX ADMIN — GABAPENTIN 300 MILLIGRAM(S): 400 CAPSULE ORAL at 05:43

## 2023-11-24 RX ADMIN — PREGABALIN 1000 MICROGRAM(S): 225 CAPSULE ORAL at 12:14

## 2023-11-24 NOTE — PROGRESS NOTE ADULT - SUBJECTIVE AND OBJECTIVE BOX
Podiatry Progress Note    Subjective:  ALEXANDRO MONROE is a  58y Male.   Seen bedside.   Patient is a 58y old  Male who presents with a chief complaint of Gangrenous Right 3rd toe (23 Nov 2023 21:48)      Past Medical History and Surgical History  PAST MEDICAL & SURGICAL HISTORY:  CAD (coronary artery disease)      DVT (deep venous thrombosis)      ESRD on dialysis      DM (diabetes mellitus)      Morbid obesity      Peripheral vascular disease      H/O varicose vein stripping      S/P dialysis catheter insertion           Objective:  Vital Signs Last 24 Hrs  T(C): 36.7 (24 Nov 2023 08:06), Max: 36.8 (23 Nov 2023 15:36)  T(F): 98 (24 Nov 2023 08:06), Max: 98.3 (23 Nov 2023 15:36)  HR: 99 (24 Nov 2023 08:06) (92 - 104)  BP: 135/61 (24 Nov 2023 08:06) (135/61 - 163/73)  BP(mean): 88 (24 Nov 2023 08:06) (88 - 105)  RR: 18 (24 Nov 2023 00:01) (17 - 18)  SpO2: 93% (24 Nov 2023 05:27) (85% - 97%)    Parameters below as of 24 Nov 2023 05:27  Patient On (Oxygen Delivery Method): room air                            9.6    8.51  )-----------( 199      ( 24 Nov 2023 06:01 )             31.1                 11-24    138  |  97<L>  |  55<H>  ----------------------------<  148<H>  4.9   |  25  |  9.3<HH>    Ca    9.3      24 Nov 2023 06:01  Phos  8.6     11-24  Mg     2.0     11-24          Physical Exam - Right Lower Extremity Focused:   Derm: Right foot dry gangrenous changes to the  3rd, and 4th toes, no purulent drainage, mild malodor, no erythema. Slightly proximal progression of gangrene, well demarcated on drosale and plantar aspect of the forefoot  Vascular: DP and PT Pulses Diminished; Foot is Warm to Warm to the touch;  Neuro: Protective Sensation Diminished / Moderately Neuropathic   MSK: Pain On Palpation at Wound Site     Assessment:  Right foot dry gangrene forefoot    Plan:  Chart reviewed and Patient evaluated. All Questions and Concerns Addressed and Answered  Local Wound Care; Wound Packed w/ Betadine Soaked Gauze / DSD / Kerlix; continue q24h  Weight Bearing Status; WBAT w/ Heel Touch w/ Surgical Shoe;   Planning for surgical intervention; TMA R foot next week; will confirm once patient is scheduled  Discussed plan with Dr Gallegos    Podiatry        Podiatry Progress Note    Subjective:  ALEXANDRO MONROE is a  58y Male.   Seen bedside.   Patient is a 58y old  Male who presents with a chief complaint of Gangrenous Right 3rd toe (23 Nov 2023 21:48)      Past Medical History and Surgical History  PAST MEDICAL & SURGICAL HISTORY:  CAD (coronary artery disease)      DVT (deep venous thrombosis)      ESRD on dialysis      DM (diabetes mellitus)      Morbid obesity      Peripheral vascular disease      H/O varicose vein stripping      S/P dialysis catheter insertion           Objective:  Vital Signs Last 24 Hrs  T(C): 36.7 (24 Nov 2023 08:06), Max: 36.8 (23 Nov 2023 15:36)  T(F): 98 (24 Nov 2023 08:06), Max: 98.3 (23 Nov 2023 15:36)  HR: 99 (24 Nov 2023 08:06) (92 - 104)  BP: 135/61 (24 Nov 2023 08:06) (135/61 - 163/73)  BP(mean): 88 (24 Nov 2023 08:06) (88 - 105)  RR: 18 (24 Nov 2023 00:01) (17 - 18)  SpO2: 93% (24 Nov 2023 05:27) (85% - 97%)    Parameters below as of 24 Nov 2023 05:27  Patient On (Oxygen Delivery Method): room air                            9.6    8.51  )-----------( 199      ( 24 Nov 2023 06:01 )             31.1                 11-24    138  |  97<L>  |  55<H>  ----------------------------<  148<H>  4.9   |  25  |  9.3<HH>    Ca    9.3      24 Nov 2023 06:01  Phos  8.6     11-24  Mg     2.0     11-24          Physical Exam - Right Lower Extremity Focused:   Derm: Right foot dry gangrenous changes to the  3rd, and 4th toes, no purulent drainage, mild malodor, no erythema. Slightly proximal progression of gangrene, well demarcated on drosale and plantar aspect of the forefoot  Vascular: DP and PT Pulses Diminished; Foot is Warm to Warm to the touch;  Neuro: Protective Sensation Diminished / Moderately Neuropathic   MSK: Pain On Palpation at Wound Site     Assessment:  Right foot dry gangrene forefoot    Plan:  Chart reviewed and Patient evaluated. All Questions and Concerns Addressed and Answered  Local Wound Care; Wound Packed w/ Betadine Soaked Gauze / DSD / Kerlix; continue q24h  Weight Bearing Status; WBAT w/ Heel Touch w/ Surgical Shoe;   Planning for surgical intervention; TMA R foot 11/28 @1130am  Discussed plan with Dr Gallegos    Podiatry        Podiatry Progress Note    Subjective:  ALEXANDRO MONROE is a  58y Male.   Seen bedside.   Patient is a 58y old  Male who presents with a chief complaint of Gangrenous Right 3rd toe (23 Nov 2023 21:48)      Past Medical History and Surgical History  PAST MEDICAL & SURGICAL HISTORY:  CAD (coronary artery disease)      DVT (deep venous thrombosis)      ESRD on dialysis      DM (diabetes mellitus)      Morbid obesity      Peripheral vascular disease      H/O varicose vein stripping      S/P dialysis catheter insertion           Objective:  Vital Signs Last 24 Hrs  T(C): 36.7 (24 Nov 2023 08:06), Max: 36.8 (23 Nov 2023 15:36)  T(F): 98 (24 Nov 2023 08:06), Max: 98.3 (23 Nov 2023 15:36)  HR: 99 (24 Nov 2023 08:06) (92 - 104)  BP: 135/61 (24 Nov 2023 08:06) (135/61 - 163/73)  BP(mean): 88 (24 Nov 2023 08:06) (88 - 105)  RR: 18 (24 Nov 2023 00:01) (17 - 18)  SpO2: 93% (24 Nov 2023 05:27) (85% - 97%)    Parameters below as of 24 Nov 2023 05:27  Patient On (Oxygen Delivery Method): room air                            9.6    8.51  )-----------( 199      ( 24 Nov 2023 06:01 )             31.1                 11-24    138  |  97<L>  |  55<H>  ----------------------------<  148<H>  4.9   |  25  |  9.3<HH>    Ca    9.3      24 Nov 2023 06:01  Phos  8.6     11-24  Mg     2.0     11-24          Physical Exam - Right Lower Extremity Focused:   Derm: Right foot dry gangrenous changes to the  3rd, and 4th toes, no purulent drainage, mild malodor, no erythema. Slightly proximal progression of gangrene, well demarcated on drosale and plantar aspect of the forefoot  Vascular: DP and PT Pulses Diminished; Foot is Warm to Warm to the touch;  Neuro: Protective Sensation Diminished / Moderately Neuropathic   MSK: Pain On Palpation at Wound Site     Assessment:  Right foot dry gangrene forefoot    Plan:  Chart reviewed and Patient evaluated. All Questions and Concerns Addressed and Answered  Local Wound Care; Wound Packed w/ Betadine Soaked Gauze / DSD / Kerlix; continue q24h  Weight Bearing Status; WBAT w/ Heel Touch w/ Surgical Shoe;   Planning for surgical intervention; TMA R foot 11/27 @1130am  Discussed plan with Dr Gallegos    Podiatry

## 2023-11-24 NOTE — PROGRESS NOTE ADULT - ASSESSMENT
ESRD on HD TTS in [place]  R foot gangrene  HTN  Normocytic anemia  - HD  wed  next Saturday (holiday schedlue)  - renal diet  - check phos level   - monitor h/h   - BP controlled  - dose abx for iHD, on zosyn  followed by vascular/podiatry / sp PTA right CFA    will follow

## 2023-11-24 NOTE — DISCHARGE NOTE PROVIDER - HOSPITAL COURSE
58 year old male with past medical history of Type 2 diabetes (now treated with Insulin), Bilateral lower extremity DVT (on Eliquis),  ESRD (HD on Tues/Thurs/Sat), CAD, morbid obesity, PVD, who now presents in the ER sent in by his podiatrist Dr. Dudley due to gangrenous Right 3rd and 4th toes. Patient underwent PTA to the right CFA on 11/20. The patient's cardiologist and podiatrist opted for conservative management of 3rd and 4th toe Gangrene. Patient expressed to podiatry that he would like to continue with conservative measures and declined surgery at this time. Patient instructed to continue local wound care. Patient will be discharged on Augmentin and restarted on Eliquis. Patient is being discharged in stable condition.

## 2023-11-24 NOTE — DISCHARGE NOTE PROVIDER - NSDCCPCAREPLAN_GEN_ALL_CORE_FT
PRINCIPAL DISCHARGE DIAGNOSIS  Diagnosis: Gangrenous toe  Assessment and Plan of Treatment:   - Continue local wound care

## 2023-11-24 NOTE — CHART NOTE - NSCHARTNOTEFT_GEN_A_CORE
Amputation cancelled; attending discussed with patient's family. Patient would like to continue with conservative measures, declines surgery at this time. Continue local wound care betadine to foot q24h. Stable from podiatry standpoint; may f/u with Dr. Gallegos as outpatient    Podiatry   [phone]

## 2023-11-24 NOTE — DISCHARGE NOTE PROVIDER - NSDCCPTREATMENT_GEN_ALL_CORE_FT
PRINCIPAL PROCEDURE  Procedure: Angiogram, peripheral, with PTA if indicated  Findings and Treatment:      PRINCIPAL PROCEDURE  Procedure: Angiogram, peripheral, with PTA if indicated  Findings and Treatment: Medications:  - DO NOT stop your dual antiplatelet medication (ie: Plavix/clopidogrel, Aspirin), unless directed by your Cardiologist   - Soreness or tenderness at the site is possible, it will diminish over time. You may take Tylenol every 4-6 hours as needed. Nothing stronger should be required.   Activity:  - Do not drive for 3 days.  - Support the groin site with your hand when you  sneeze or cough. No heavy lifting (objects more than 30 pounds) x 2 weeks.  - May resume routine walking today and increase activity as tolerated on Monday.  Hygiene:  - Leave dressing in place for 24-48 hours, if does not fall off on own may remove after 48 hours.   - After 24 hours, you may shower. Do not tub bathe for one week. Do not rub or apply lotion, cream, powder to the affected site. Leave it open to air.   Diet:   - You may resume your regular diet.   - Drink extra fluid unless othrwise advised.   Special Instructions:  - Bruising or black and blue at the puncture site is possible.  - If there is bleeding from the puncture site apply direct, firm pressure on the site and call 911.  - Any sudden swelling, redness, fever, discharge or severe pain, call your physician or call the cath lab.  - If you notice any scab formation in the area avoid touching the site and allow it to heal.  - If Numbness or "pins and needle" sensation occurs in the affected leg; or if the affected site becomes cool to touch or pale that persists for an extended period of time, call your physician immediately to be checked.   - Some swelling to the leg is expected.    - Inform your Dentist or Surgeon if you are taking Aspirin or any antiplatelet medications. Report any bleeding in your urine or stool.   Follow-up:  Please follow up with your Cardiologist in 2 weeks after discharge.        PRINCIPAL PROCEDURE  Procedure: Angiogram, peripheral, with PTA if indicated  Findings and Treatment: Medications:  - DO NOT stop your dual antiplatelet medication (ie: Plavix/clopidogrel, Aspirin) and elqiuis unless directed by your Cardiologist   - Soreness or tenderness at the site is possible, it will diminish over time. You may take Tylenol every 4-6 hours as needed. Nothing stronger should be required.   Activity:  - Do not drive for 3 days.  - Support the groin site with your hand when you  sneeze or cough. No heavy lifting (objects more than 30 pounds) x 2 weeks.  - May resume routine walking today and increase activity as tolerated on Monday.  Hygiene:  - Leave dressing in place for 24-48 hours, if does not fall off on own may remove after 48 hours.   - After 24 hours, you may shower. Do not tub bathe for one week. Do not rub or apply lotion, cream, powder to the affected site. Leave it open to air.   Diet:   - You may resume your regular diet.   - Drink extra fluid unless othrwise advised.   Special Instructions:  - Bruising or black and blue at the puncture site is possible.  - If there is bleeding from the puncture site apply direct, firm pressure on the site and call 911.  - Any sudden swelling, redness, fever, discharge or severe pain, call your physician or call the cath lab.  - If you notice any scab formation in the area avoid touching the site and allow it to heal.  - If Numbness or "pins and needle" sensation occurs in the affected leg; or if the affected site becomes cool to touch or pale that persists for an extended period of time, call your physician immediately to be checked.   - Some swelling to the leg is expected.    - Inform your Dentist or Surgeon if you are taking Aspirin or any antiplatelet medications. Report any bleeding in your urine or stool.   Follow-up:  Please follow up with your Cardiologist in 2 weeks after discharge.

## 2023-11-24 NOTE — DISCHARGE NOTE PROVIDER - NSDCMRMEDTOKEN_GEN_ALL_CORE_FT
Aranesp 40 mcg/0.4 mL injectable solution: 0.4 milliliter(s) injectable once a week thursday  cyanocobalamin 100 mcg oral tablet: 1 tab(s) orally once a day  Eliquis 5 mg oral tablet: 1 tab(s) orally 2 times a day  gabapentin 300 mg oral tablet: 1 cap(s) orally every 24 hours  midodrine 5 mg oral tablet: 2 tab(s) orally 3 times a day  Nephrocaps oral capsule: 1 cap(s) orally once a day  NovoLOG 100 units/mL injectable solution: 7 unit(s) injectable 3 times a day  Tresiba 100 units/mL subcutaneous solution: 3 unit(s) subcutaneous once a day (at bedtime)  Venofer 20 mg/mL intravenous solution: 5 milliliter(s) intravenous Tuesday, Thursday, Sunday with HD   amLODIPine 10 mg oral tablet: 1 tab(s) orally once a day  amoxicillin-clavulanate 875 mg-125 mg oral tablet: 875 milligram(s) orally 2 times a day  Aranesp 40 mcg/0.4 mL injectable solution: 0.4 milliliter(s) injectable once a week thursday  aspirin 81 mg oral delayed release tablet: 1 tab(s) orally once a day  atorvastatin 40 mg oral tablet: 1 tab(s) orally once a day (at bedtime)  clopidogrel 75 mg oral tablet: 1 tab(s) orally once a day  cyanocobalamin 100 mcg oral tablet: 1 tab(s) orally once a day  Eliquis 5 mg oral tablet: 1 tab(s) orally 2 times a day  gabapentin 300 mg oral capsule: 1 cap(s) orally 2 times a day  midodrine 5 mg oral tablet: 2 tab(s) orally 3 times a day  Nephrocaps oral capsule: 1 cap(s) orally once a day  NovoLOG 100 units/mL injectable solution: 7 unit(s) injectable 3 times a day  Tresiba 100 units/mL subcutaneous solution: 3 unit(s) subcutaneous once a day (at bedtime)  Venofer 20 mg/mL intravenous solution: 5 milliliter(s) intravenous Tuesday, Thursday, Sunday with HD   amLODIPine 10 mg oral tablet: 1 tab(s) orally once a day  amoxicillin-clavulanate 875 mg-125 mg oral tablet: 875 milligram(s) orally 2 times a day  Aranesp 40 mcg/0.4 mL injectable solution: 0.4 milliliter(s) injectable once a week thursday  aspirin 81 mg oral delayed release tablet: 1 tab(s) orally once a day  atorvastatin 40 mg oral tablet: 1 tab(s) orally once a day (at bedtime)  clopidogrel 75 mg oral tablet: 1 tab(s) orally once a day  cyanocobalamin 100 mcg oral tablet: 1 tab(s) orally once a day  Eliquis 2.5 mg oral tablet: 1 tab(s) orally 2 times a day  gabapentin 300 mg oral capsule: 1 cap(s) orally 2 times a day  midodrine 5 mg oral tablet: 2 tab(s) orally 3 times a day  Nephrocaps oral capsule: 1 cap(s) orally once a day  NovoLOG 100 units/mL injectable solution: 7 unit(s) injectable 3 times a day  Tresiba 100 units/mL subcutaneous solution: 3 unit(s) subcutaneous once a day (at bedtime)  Venofer 20 mg/mL intravenous solution: 5 milliliter(s) intravenous Tuesday, Thursday, Sunday with HD   amLODIPine 10 mg oral tablet: 1 tab(s) orally once a day  Aranesp 40 mcg/0.4 mL injectable solution: 0.4 milliliter(s) injectable once a week thursday  aspirin 81 mg oral delayed release tablet: 1 tab(s) orally once a day  atorvastatin 40 mg oral tablet: 1 tab(s) orally once a day (at bedtime)  Augmentin 500 mg-125 mg oral tablet: 500 milligram(s) orally every 12 hours  carvedilol 6.25 mg oral tablet: 1 tab(s) orally every 12 hours  clopidogrel 75 mg oral tablet: 1 tab(s) orally once a day  cyanocobalamin 100 mcg oral tablet: 1 tab(s) orally once a day  Eliquis 5 mg oral tablet: 1 tab(s) orally 2 times a day  gabapentin 300 mg oral capsule: 1 cap(s) orally 2 times a day  Nephrocaps oral capsule: 1 cap(s) orally once a day  NovoLOG 100 units/mL injectable solution: 7 unit(s) injectable 3 times a day  Tresiba 100 units/mL subcutaneous solution: 3 unit(s) subcutaneous once a day (at bedtime)  Venofer 20 mg/mL intravenous solution: 5 milliliter(s) intravenous Tuesday, Thursday, Sunday with HD   amLODIPine 10 mg oral tablet: 1 tab(s) orally once a day  Aranesp 40 mcg/0.4 mL injectable solution: 0.4 milliliter(s) injectable once a week thursday  aspirin 81 mg oral delayed release tablet: 1 tab(s) orally once a day  atorvastatin 40 mg oral tablet: 1 tab(s) orally once a day (at bedtime)  Augmentin 500 mg-125 mg oral tablet: 500 milligram(s) orally every 12 hours  carvedilol 6.25 mg oral tablet: 1 tab(s) orally every 12 hours  clopidogrel 75 mg oral tablet: 1 tab(s) orally once a day  cyanocobalamin 100 mcg oral tablet: 1 tab(s) orally once a day  Eliquis 2.5 mg oral tablet: 1 tab(s) orally 2 times a day  gabapentin 300 mg oral capsule: 1 cap(s) orally 2 times a day  Nephrocaps oral capsule: 1 cap(s) orally once a day  NovoLOG 100 units/mL injectable solution: 7 unit(s) injectable 3 times a day  Tresiba 100 units/mL subcutaneous solution: 3 unit(s) subcutaneous once a day (at bedtime)  Venofer 20 mg/mL intravenous solution: 5 milliliter(s) intravenous Tuesday, Thursday, Sunday with HD

## 2023-11-24 NOTE — PROGRESS NOTE ADULT - SUBJECTIVE AND OBJECTIVE BOX
Nephrology progress note  events over the last 24 h noted .    Allergies:  Azactam (Hives; Rash)    Hospital Medications:   MEDICATIONS  (STANDING):  amLODIPine   Tablet 10 milliGRAM(s) Oral daily  aspirin enteric coated 81 milliGRAM(s) Oral daily  atorvastatin 40 milliGRAM(s) Oral at bedtime  chlorhexidine 2% Cloths 1 Application(s) Topical <User Schedule>  clopidogrel Tablet 75 milliGRAM(s) Oral daily  cyanocobalamin 1000 MICROGram(s) Oral daily  dextrose 5%. 1000 milliLiter(s) (50 mL/Hr) IV Continuous <Continuous>  dextrose 5%. 1000 milliLiter(s) (100 mL/Hr) IV Continuous <Continuous>  dextrose 50% Injectable 25 Gram(s) IV Push once  dextrose 50% Injectable 25 Gram(s) IV Push once  dextrose 50% Injectable 12.5 Gram(s) IV Push once  gabapentin 300 milliGRAM(s) Oral two times a day  glucagon  Injectable 1 milliGRAM(s) IntraMuscular once  heparin  Infusion.  Unit(s)/Hr (15 mL/Hr) IV Continuous <Continuous>  influenza   Vaccine 0.5 milliLiter(s) IntraMuscular once  insulin glargine Injectable (LANTUS) 12 Unit(s) SubCutaneous at bedtime  insulin lispro (ADMELOG) corrective regimen sliding scale   SubCutaneous three times a day before meals  insulin lispro (ADMELOG) corrective regimen sliding scale   SubCutaneous at bedtime  insulin lispro Injectable (ADMELOG) 4 Unit(s) SubCutaneous three times a day before meals  lactobacillus acidophilus 1 Tablet(s) Oral once  Nephro-danette 1 Tablet(s) Oral daily        VITALS:  T(F): 98 (11-24-23 @ 08:06), Max: 98.3 (11-23-23 @ 15:36)  HR: 99 (11-24-23 @ 08:06)  BP: 135/61 (11-24-23 @ 08:06)  RR: 18 (11-24-23 @ 00:01)  SpO2: 93% (11-24-23 @ 05:27)  Wt(kg): --    11-22 @ 07:01  -  11-23 @ 07:00  --------------------------------------------------------  IN: 697 mL / OUT: 0 mL / NET: 697 mL    11-23 @ 07:01  -  11-24 @ 07:00  --------------------------------------------------------  IN: 656 mL / OUT: 0 mL / NET: 656 mL          PHYSICAL EXAM:  Constitutional: NAD  HEENT: anicteric sclera, oropharynx clear, MMM  Neck: No JVD  Respiratory: CTAB, no wheezes, rales or rhonchi  Cardiovascular: S1, S2, RRR  Gastrointestinal: BS+, soft, NT/ND  Extremities: No cyanosis or clubbing. No peripheral edema  :  No callahan.   Skin: No rashes    LABS:  11-24    138  |  97<L>  |  55<H>  ----------------------------<  148<H>  4.9   |  25  |  9.3<HH>    Ca    9.3      24 Nov 2023 06:01  Phos  8.6     11-24  Mg     2.0     11-24                            9.6    8.51  )-----------( 199      ( 24 Nov 2023 06:01 )             31.1       Urine Studies:  Urinalysis Basic - ( 24 Nov 2023 06:01 )    Color:  / Appearance:  / SG:  / pH:   Gluc: 148 mg/dL / Ketone:   / Bili:  / Urobili:    Blood:  / Protein:  / Nitrite:    Leuk Esterase:  / RBC:  / WBC    Sq Epi:  / Non Sq Epi:  / Bacteria:         RADIOLOGY & ADDITIONAL STUDIES:

## 2023-11-24 NOTE — PROGRESS NOTE ADULT - NS ATTEND AMEND GEN_ALL_CORE FT
58yoM with CAD. PVD, IDDM, ESRD (HD on TThSat), bilateral lower extremity DVTs on Eliquis, and obesity who was sent in by his podiatrist Dr. Dudley due to gangrenous right 3rd and 4th toes. Patient underwent PTA to the right CFA on 11/20. He is awaiting surgical intervention with Podiatry for toe amputation next week.    Patient states he has not met a Podiatrist. I called [phone] with no answer today. Will attempt to reach out again tomorrow, as the patient would like to speak with the Podiatry team directly.     Plan:   - Lab holiday on Friday 11/24 and Sat 11/25  - Awaiting toe amputations  - Will stay on heparin gtt, plan to resume Eliquis after surgery 58yoM with CAD. PVD, IDDM, ESRD (HD on TThSat), bilateral lower extremity DVTs on Eliquis, and obesity who was sent in by his podiatrist Dr. Dudley due to gangrenous right 3rd and 4th toes. Patient underwent PTA to the right CFA on 11/20. He is awaiting surgical intervention with Podiatry for toe amputation next week.    Plan:   - Lab holiday on Sat 11/25  - Awaiting toe amputations  - Will stay on heparin gtt, plan to resume Eliquis after surgery

## 2023-11-24 NOTE — CHART NOTE - NSCHARTNOTESELECT_GEN_ALL_CORE
ABx/Event Note
Brief op note: post PTA/Event Note
Event Note
Transfer Note
Event Note
precath note/Event Note

## 2023-11-24 NOTE — DISCHARGE NOTE PROVIDER - CARE PROVIDER_API CALL
Aleksandra Go  Interventional Cardiology  [address] [place] [id number]  Phone: [phone]  Fax: [phone]  Follow Up Time: 2 weeks

## 2023-11-24 NOTE — PROGRESS NOTE ADULT - SUBJECTIVE AND OBJECTIVE BOX
Denies any chest pain, SOB or palpitations   No orthopnea or PND  Feeling better deneis any pain in his right foot       MEDICATIONS  (STANDING):  amLODIPine   Tablet 10 milliGRAM(s) Oral daily  aspirin enteric coated 81 milliGRAM(s) Oral daily  atorvastatin 40 milliGRAM(s) Oral at bedtime  chlorhexidine 2% Cloths 1 Application(s) Topical <User Schedule>  clopidogrel Tablet 75 milliGRAM(s) Oral daily  cyanocobalamin 1000 MICROGram(s) Oral daily  dextrose 5%. 1000 milliLiter(s) (50 mL/Hr) IV Continuous <Continuous>  dextrose 5%. 1000 milliLiter(s) (100 mL/Hr) IV Continuous <Continuous>  dextrose 50% Injectable 12.5 Gram(s) IV Push once  dextrose 50% Injectable 25 Gram(s) IV Push once  dextrose 50% Injectable 25 Gram(s) IV Push once  gabapentin 300 milliGRAM(s) Oral two times a day  glucagon  Injectable 1 milliGRAM(s) IntraMuscular once  heparin  Infusion.  Unit(s)/Hr (15 mL/Hr) IV Continuous <Continuous>  influenza   Vaccine 0.5 milliLiter(s) IntraMuscular once  insulin glargine Injectable (LANTUS) 12 Unit(s) SubCutaneous at bedtime  insulin lispro (ADMELOG) corrective regimen sliding scale   SubCutaneous three times a day before meals  insulin lispro (ADMELOG) corrective regimen sliding scale   SubCutaneous at bedtime  insulin lispro Injectable (ADMELOG) 4 Unit(s) SubCutaneous three times a day before meals  lactobacillus acidophilus 1 Tablet(s) Oral once  Nephro-danette 1 Tablet(s) Oral daily    MEDICATIONS  (PRN):  acetaminophen     Tablet .. 650 milliGRAM(s) Oral every 6 hours PRN Mild Pain (1 - 3), Moderate Pain (4 - 6)  dextrose Oral Gel 15 Gram(s) Oral once PRN Blood Glucose LESS THAN 70 milliGRAM(s)/deciliter  heparin   Injectable 3000 Unit(s) IV Push every 6 hours PRN For aPTT between 40 - 57  heparin   Injectable 6500 Unit(s) IV Push every 6 hours PRN For aPTT less than 40  melatonin 3 milliGRAM(s) Oral at bedtime PRN Insomnia            Vital Signs Last 24 Hrs  T(C): 36.8 (24 Nov 2023 15:10), Max: 36.8 (24 Nov 2023 00:01)  T(F): 98.3 (24 Nov 2023 15:10), Max: 98.3 (24 Nov 2023 00:01)  HR: 91 (24 Nov 2023 15:10) (91 - 104)  BP: 155/74 (24 Nov 2023 15:10) (135/61 - 163/73)  BP(mean): 106 (24 Nov 2023 15:10) (88 - 106)  RR: 18 (24 Nov 2023 15:10) (17 - 18)  SpO2: 93% (24 Nov 2023 05:27) (85% - 96%)    Parameters below as of 24 Nov 2023 05:27  Patient On (Oxygen Delivery Method): room air         REVIEW OF SYSTEMS:  CONSTITUTIONAL: No fever, weight loss, or fatigue  CARDIOLOGY: Patient denies chest pain, shortness of breath or syncopal episodes.   RESPIRATORY: denies shortness of breath, wheezeing.   NEUROLOGICAL: NO weakness, no focal deficits to report.  GI: no BRBPR, no N,V,diarrhea.    PSYCHIATRY: normal mood and affect  HEENT: no nasal discharge, no ecchymosis  SKIN: no ecchymosis, no breakdown  MUSCULOSKELETAL: Full range of motion x4.        PHYSICAL EXAM:  · CONSTITUTIONAL:	Well-developed, well nourished      ·RESPIRATORY:   airway patent; breath sounds equal; good air movement; respirations non-labored; clear to auscultation bilaterally  · CARDIOVASCULAR	regular rate and rhythm  no rub  + SE murmur  normal PMI  · EXTREMITIES: No cyanosis, clubbing or edema  · VASCULAR: absent left AT/PT. Absent right PT   	  TELEMETRY: sinus rhythm       LABS:                        9.6    8.51  )-----------( 199      ( 24 Nov 2023 06:01 )             31.1     11-24    138  |  97<L>  |  55<H>  ----------------------------<  148<H>  4.9   |  25  |  9.3<HH>    Ca    9.3      24 Nov 2023 06:01  Phos  8.6     11-24  Mg     2.0     11-24          PTT - ( 24 Nov 2023 06:01 )  PTT:58.2 sec    I&O's Summary    23 Nov 2023 07:01  -  24 Nov 2023 07:00  --------------------------------------------------------  IN: 656 mL / OUT: 0 mL / NET: 656 mL      BNP  RADIOLOGY & ADDITIONAL STUDIES:    IMPRESSION AND PLAN:      Continue current care as per Podiatry recommendations  Plan of care was discussed in detail with Podiatry and patient. The patient would like to pursue conservative approach   Antibiotics and wound care   IF poor wound healing then possible repeat PTA   Start Eliquis 2.5 mg bid in addition to DAPT   Frequent evaluation by Podiatry was recommended to the patient   Will F/U 1-2 weeks post D/C

## 2023-11-24 NOTE — DISCHARGE NOTE PROVIDER - ATTENDING DISCHARGE PHYSICAL EXAMINATION:
Left femoral access site is clean, dry, and intact.     Per conversations with Podiatry, Dr. Go, patient, and patient's family, decision was made for conservation management and no plan for amputation.

## 2023-11-25 VITALS — SYSTOLIC BLOOD PRESSURE: 142 MMHG | HEART RATE: 92 BPM | DIASTOLIC BLOOD PRESSURE: 68 MMHG

## 2023-11-25 LAB
APTT BLD: 57.9 SEC — HIGH (ref 27–39.2)
APTT BLD: 57.9 SEC — HIGH (ref 27–39.2)
GLUCOSE BLDC GLUCOMTR-MCNC: 109 MG/DL — HIGH (ref 70–99)
GLUCOSE BLDC GLUCOMTR-MCNC: 109 MG/DL — HIGH (ref 70–99)
GLUCOSE BLDC GLUCOMTR-MCNC: 122 MG/DL — HIGH (ref 70–99)
GLUCOSE BLDC GLUCOMTR-MCNC: 122 MG/DL — HIGH (ref 70–99)

## 2023-11-25 PROCEDURE — 99239 HOSP IP/OBS DSCHRG MGMT >30: CPT

## 2023-11-25 RX ORDER — APIXABAN 2.5 MG/1
1 TABLET, FILM COATED ORAL
Refills: 0 | DISCHARGE

## 2023-11-25 RX ORDER — MIDODRINE HYDROCHLORIDE 2.5 MG/1
2 TABLET ORAL
Refills: 0 | DISCHARGE

## 2023-11-25 RX ORDER — APIXABAN 2.5 MG/1
1 TABLET, FILM COATED ORAL
Qty: 0 | Refills: 0 | DISCHARGE
Start: 2023-11-25

## 2023-11-25 RX ORDER — INSULIN ASPART 100 [IU]/ML
7 INJECTION, SOLUTION SUBCUTANEOUS
Refills: 0 | DISCHARGE

## 2023-11-25 RX ORDER — CARVEDILOL PHOSPHATE 80 MG/1
1 CAPSULE, EXTENDED RELEASE ORAL
Qty: 60 | Refills: 0
Start: 2023-11-25 | End: 2023-12-24

## 2023-11-25 RX ORDER — INSULIN ASPART 100 [IU]/ML
7 INJECTION, SOLUTION SUBCUTANEOUS
Qty: 1 | Refills: 0
Start: 2023-11-25 | End: 2023-12-24

## 2023-11-25 RX ORDER — CARVEDILOL PHOSPHATE 80 MG/1
6.25 CAPSULE, EXTENDED RELEASE ORAL EVERY 12 HOURS
Refills: 0 | Status: DISCONTINUED | OUTPATIENT
Start: 2023-11-25 | End: 2023-11-25

## 2023-11-25 RX ADMIN — GABAPENTIN 300 MILLIGRAM(S): 400 CAPSULE ORAL at 05:49

## 2023-11-25 RX ADMIN — CLOPIDOGREL BISULFATE 75 MILLIGRAM(S): 75 TABLET, FILM COATED ORAL at 12:56

## 2023-11-25 RX ADMIN — HEPARIN SODIUM 1900 UNIT(S)/HR: 5000 INJECTION INTRAVENOUS; SUBCUTANEOUS at 07:00

## 2023-11-25 RX ADMIN — AMLODIPINE BESYLATE 10 MILLIGRAM(S): 2.5 TABLET ORAL at 05:50

## 2023-11-25 RX ADMIN — Medication 1 TABLET(S): at 12:56

## 2023-11-25 RX ADMIN — PREGABALIN 1000 MICROGRAM(S): 225 CAPSULE ORAL at 12:56

## 2023-11-25 RX ADMIN — CHLORHEXIDINE GLUCONATE 1 APPLICATION(S): 213 SOLUTION TOPICAL at 06:31

## 2023-11-25 RX ADMIN — Medication 81 MILLIGRAM(S): at 12:56

## 2023-11-25 NOTE — PROGRESS NOTE ADULT - REASON FOR ADMISSION
Gangrenous Right 3rd toe

## 2023-11-25 NOTE — PROGRESS NOTE ADULT - SUBJECTIVE AND OBJECTIVE BOX
Nephrology progress note  Patient is seen and examined, events over the last 24 h noted .  Lying in bed comfortable     Allergies:  Azactam (Hives; Rash)    Hospital Medications:   MEDICATIONS  (STANDING):  amLODIPine   Tablet 10 milliGRAM(s) Oral daily  aspirin enteric coated 81 milliGRAM(s) Oral daily  atorvastatin 40 milliGRAM(s) Oral at bedtime  clopidogrel Tablet 75 milliGRAM(s) Oral daily  cyanocobalamin 1000 MICROGram(s) Oral daily  gabapentin 300 milliGRAM(s) Oral two times a day  glucagon  Injectable 1 milliGRAM(s) IntraMuscular once  heparin  Infusion.  Unit(s)/Hr (15 mL/Hr) IV Continuous <Continuous>  influenza   Vaccine 0.5 milliLiter(s) IntraMuscular once  insulin glargine Injectable (LANTUS) 12 Unit(s) SubCutaneous at bedtime  insulin lispro (ADMELOG) corrective regimen sliding scale   SubCutaneous three times a day before meals  insulin lispro (ADMELOG) corrective regimen sliding scale   SubCutaneous at bedtime  insulin lispro Injectable (ADMELOG) 4 Unit(s) SubCutaneous three times a day before meals  lactobacillus acidophilus 1 Tablet(s) Oral once  Nephro-danette 1 Tablet(s) Oral daily        VITALS:  T(F): 97.6 (11-25-23 @ 07:47), Max: 98.3 (11-24-23 @ 15:10)  HR: 96 (11-25-23 @ 07:47)  BP: 153/73 (11-25-23 @ 07:47)  RR: 20 (11-25-23 @ 07:47)  SpO2: 95% (11-25-23 @ 04:54)      11-23 @ 07:01  -  11-24 @ 07:00  --------------------------------------------------------  IN: 656 mL / OUT: 0 mL / NET: 656 mL          PHYSICAL EXAM:  Constitutional: NAD  Respiratory: CTAB, no wheezes, rales or rhonchi  Cardiovascular: S1, S2, RRR  Gastrointestinal: BS+, soft, NT/ND  Extremities: No cyanosis or clubbing. No peripheral edema right foot in dressing   :  No callahan.   Skin: No rashes    LABS:  11-24    138  |  97<L>  |  55<H>  ----------------------------<  148<H>  4.9   |  25  |  9.3<HH>    Ca    9.3      24 Nov 2023 06:01  Phos  8.6     11-24  Mg     2.0     11-24                            9.6    8.51  )-----------( 199      ( 24 Nov 2023 06:01 )             31.1       Urine Studies:  Urinalysis Basic - ( 24 Nov 2023 06:01 )    Color:  / Appearance:  / SG:  / pH:   Gluc: 148 mg/dL / Ketone:   / Bili:  / Urobili:    Blood:  / Protein:  / Nitrite:    Leuk Esterase:  / RBC:  / WBC    Sq Epi:  / Non Sq Epi:  / Bacteria:             HBsAb Nonreact      [11-13-21 @ 09:30]  HBsAg Nonreact      [11-08-21 @ 15:44]  HCV 0.12, Nonreact      [11-08-21 @ 15:44]        RADIOLOGY & ADDITIONAL STUDIES:

## 2023-11-25 NOTE — PROGRESS NOTE ADULT - PROVIDER SPECIALTY LIST ADULT
Cardiology
Nephrology
Podiatry
Cardiology
Hospitalist
Hospitalist
Nephrology
Nephrology
Podiatry
Cardiology
Hospitalist
Internal Medicine
Nephrology
Nephrology
Podiatry
Podiatry
Cardiology
Nephrology

## 2023-11-25 NOTE — PROGRESS NOTE ADULT - ASSESSMENT
ESRD on HD TTS in [place]  R foot gangrene  HTN  Normocytic anemia  - HD  today 3h opti 160 UF 3l as toelrated   - renal diet  - check phos level   - monitor h/h   - BP controlled  - dose abx for iHD, on zosyn  followed by vascular/podiatry / sp PTA right CFA    on DC follow with OP HD unit in [facility]  will follow

## 2025-01-07 NOTE — ASU PREOP CHECKLIST - BP NONINVASIVE SYSTOLIC (MM HG)
Value base Outreach  Health Maintenance Topic(s) Outreach Outcomes & Actions Taken:    Blood Pressure - Outreach Outcomes & Actions Taken  : unable to obtain remotely       Additional Notes:           Care Management, Digital Medicine, and/or Education Referrals      Referred to [facility]          180